# Patient Record
Sex: MALE | Race: WHITE | NOT HISPANIC OR LATINO | Employment: OTHER | URBAN - METROPOLITAN AREA
[De-identification: names, ages, dates, MRNs, and addresses within clinical notes are randomized per-mention and may not be internally consistent; named-entity substitution may affect disease eponyms.]

---

## 2017-09-25 ENCOUNTER — OUTSIDE FACILITY SERVICE (OUTPATIENT)
Dept: GASTROENTEROLOGY | Facility: CLINIC | Age: 67
End: 2017-09-25

## 2017-09-25 PROCEDURE — 43235 EGD DIAGNOSTIC BRUSH WASH: CPT | Performed by: INTERNAL MEDICINE

## 2018-05-10 DIAGNOSIS — Z12.11 SCREENING FOR COLON CANCER: Primary | ICD-10-CM

## 2018-05-17 ENCOUNTER — LAB REQUISITION (OUTPATIENT)
Dept: LAB | Facility: HOSPITAL | Age: 68
End: 2018-05-17

## 2018-05-17 ENCOUNTER — OUTSIDE FACILITY SERVICE (OUTPATIENT)
Dept: GASTROENTEROLOGY | Facility: CLINIC | Age: 68
End: 2018-05-17

## 2018-05-17 DIAGNOSIS — Z86.010 HISTORY OF COLONIC POLYPS: ICD-10-CM

## 2018-05-17 DIAGNOSIS — Z12.11 ENCOUNTER FOR SCREENING FOR MALIGNANT NEOPLASM OF COLON: ICD-10-CM

## 2018-05-17 PROCEDURE — 45385 COLONOSCOPY W/LESION REMOVAL: CPT | Performed by: INTERNAL MEDICINE

## 2018-05-17 PROCEDURE — 88305 TISSUE EXAM BY PATHOLOGIST: CPT | Performed by: INTERNAL MEDICINE

## 2018-05-17 PROCEDURE — 45388 COLONOSCOPY W/ABLATION: CPT | Performed by: INTERNAL MEDICINE

## 2018-05-18 LAB
CYTO UR: NORMAL
LAB AP CASE REPORT: NORMAL
LAB AP CLINICAL INFORMATION: NORMAL
Lab: NORMAL
PATH REPORT.FINAL DX SPEC: NORMAL
PATH REPORT.GROSS SPEC: NORMAL

## 2018-07-23 ENCOUNTER — LAB (OUTPATIENT)
Dept: LAB | Facility: HOSPITAL | Age: 68
End: 2018-07-23

## 2018-07-23 ENCOUNTER — CONSULT (OUTPATIENT)
Dept: ONCOLOGY | Facility: CLINIC | Age: 68
End: 2018-07-23

## 2018-07-23 ENCOUNTER — HOSPITAL ENCOUNTER (OUTPATIENT)
Dept: PULMONOLOGY | Facility: HOSPITAL | Age: 68
Discharge: HOME | End: 2018-07-23
Attending: INTERNAL MEDICINE | Admitting: INTERNAL MEDICINE

## 2018-07-23 VITALS
TEMPERATURE: 97.5 F | HEART RATE: 56 BPM | BODY MASS INDEX: 26.22 KG/M2 | SYSTOLIC BLOOD PRESSURE: 140 MMHG | HEIGHT: 63 IN | RESPIRATION RATE: 14 BRPM | WEIGHT: 148 LBS | DIASTOLIC BLOOD PRESSURE: 87 MMHG

## 2018-07-23 DIAGNOSIS — D75.1 ERYTHROCYTOSIS: Primary | ICD-10-CM

## 2018-07-23 DIAGNOSIS — D75.1 ERYTHROCYTOSIS: ICD-10-CM

## 2018-07-23 LAB
ALBUMIN SERPL-MCNC: 4.7 G/DL (ref 3.2–4.8)
ALBUMIN/GLOB SERPL: 2.1 G/DL (ref 1.5–2.5)
ALP SERPL-CCNC: 74 U/L (ref 25–100)
ALT SERPL W P-5'-P-CCNC: 62 U/L (ref 7–40)
ANION GAP SERPL CALCULATED.3IONS-SCNC: 8 MMOL/L (ref 3–11)
ARTERIAL PATENCY WRIST A: POSITIVE
AST SERPL-CCNC: 35 U/L (ref 0–33)
ATMOSPHERIC PRESS: ABNORMAL MMHG
BASE EXCESS BLDA CALC-SCNC: 0.3 MMOL/L (ref 0–2)
BDY SITE: ABNORMAL
BILIRUB SERPL-MCNC: 0.6 MG/DL (ref 0.3–1.2)
BUN BLD-MCNC: 17 MG/DL (ref 9–23)
BUN/CREAT SERPL: 18.9 (ref 7–25)
CALCIUM SPEC-SCNC: 10.3 MG/DL (ref 8.7–10.4)
CHLORIDE SERPL-SCNC: 100 MMOL/L (ref 99–109)
CO2 BLDA-SCNC: 26.4 MMOL/L (ref 22–33)
CO2 SERPL-SCNC: 29 MMOL/L (ref 20–31)
COHGB MFR BLD: 1.2 % (ref 0–2)
CREAT BLD-MCNC: 0.9 MG/DL (ref 0.6–1.3)
ERYTHROCYTE [DISTWIDTH] IN BLOOD BY AUTOMATED COUNT: 14.3 % (ref 11.3–14.5)
GFR SERPL CREATININE-BSD FRML MDRD: 83 ML/MIN/1.73
GLOBULIN UR ELPH-MCNC: 2.2 GM/DL
GLUCOSE BLD-MCNC: 86 MG/DL (ref 70–100)
HCO3 BLDA-SCNC: 25.2 MMOL/L (ref 20–26)
HCT VFR BLD AUTO: 57.6 % (ref 38.9–50.9)
HCT VFR BLD CALC: 56.5 %
HGB BLD-MCNC: 18.6 G/DL (ref 13.1–17.5)
HGB BLDA-MCNC: 18.4 G/DL (ref 13.5–17.5)
HOROWITZ INDEX BLD+IHG-RTO: 21 %
LYMPHOCYTES # BLD AUTO: 1.5 10*3/MM3 (ref 0.6–4.8)
LYMPHOCYTES NFR BLD AUTO: 22 % (ref 24–44)
MCH RBC QN AUTO: 28.8 PG (ref 27–31)
MCHC RBC AUTO-ENTMCNC: 32.2 G/DL (ref 32–36)
MCV RBC AUTO: 89.3 FL (ref 80–99)
METHGB BLD QL: 1.1 % (ref 0–1.5)
MODALITY: ABNORMAL
MONOCYTES # BLD AUTO: 0.2 10*3/MM3 (ref 0–1)
MONOCYTES NFR BLD AUTO: 3 % (ref 0–12)
NEUTROPHILS # BLD AUTO: 5.2 10*3/MM3 (ref 1.5–8.3)
NEUTROPHILS NFR BLD AUTO: 75 % (ref 41–71)
OXYHGB MFR BLDV: 93.9 % (ref 94–99)
PCO2 BLDA: 40.7 MM HG (ref 35–48)
PCO2 TEMP ADJ BLD: ABNORMAL MM HG (ref 35–48)
PH BLDA: 7.4 PH UNITS (ref 7.35–7.45)
PH, TEMP CORRECTED: ABNORMAL PH UNITS (ref 7.35–7.45)
PLATELET # BLD AUTO: 194 10*3/MM3 (ref 150–450)
PMV BLD AUTO: 7.2 FL (ref 6–12)
PO2 BLDA: 77.5 MM HG (ref 83–108)
PO2 TEMP ADJ BLD: ABNORMAL MM HG (ref 83–108)
POTASSIUM BLD-SCNC: 4.4 MMOL/L (ref 3.5–5.5)
PROT SERPL-MCNC: 6.9 G/DL (ref 5.7–8.2)
RBC # BLD AUTO: 6.45 10*6/MM3 (ref 4.2–5.76)
SODIUM BLD-SCNC: 137 MMOL/L (ref 132–146)
WBC NRBC COR # BLD: 6.9 10*3/MM3 (ref 3.5–10.8)

## 2018-07-23 PROCEDURE — 85025 COMPLETE CBC W/AUTO DIFF WBC: CPT

## 2018-07-23 PROCEDURE — 82805 BLOOD GASES W/O2 SATURATION: CPT

## 2018-07-23 PROCEDURE — 82668 ASSAY OF ERYTHROPOIETIN: CPT

## 2018-07-23 PROCEDURE — 99205 OFFICE O/P NEW HI 60 MIN: CPT | Performed by: INTERNAL MEDICINE

## 2018-07-23 PROCEDURE — 36415 COLL VENOUS BLD VENIPUNCTURE: CPT

## 2018-07-23 PROCEDURE — 36600 WITHDRAWAL OF ARTERIAL BLOOD: CPT

## 2018-07-23 PROCEDURE — 80053 COMPREHEN METABOLIC PANEL: CPT

## 2018-07-23 RX ORDER — TIZANIDINE 4 MG/1
4 TABLET ORAL EVERY 8 HOURS PRN
COMMUNITY
Start: 2018-07-16 | End: 2022-09-30

## 2018-07-23 RX ORDER — VALSARTAN 320 MG/1
320 TABLET ORAL DAILY
COMMUNITY
Start: 2018-07-09 | End: 2018-10-05

## 2018-07-23 NOTE — PROGRESS NOTES
CHIEF COMPLAINT: Erythrocytosis    Problem List:     Erythrocytosis    7/23/2018 Initial Diagnosis     Erythrocytosis: According to records he brought from me had erythrocytosis dating back at least 3 years.  In May 2015 was 52% medical.  By September 2015 down to 48%.  November 2000 1550%.  December 2000 1653%.  June 2017 81%.  December 2000 1751%.  June 2018 55%.  Has noted some recurrent burning in his lungs when biking and hiking and he is fairly vigorously physically active.  In early June he was hiking at over 20,000 feet elevation along with biking.  Subsequently he had noted some burning just with a few miles a biking closer to home.  Been treated with a Z-Farhat but still having that sensation.  Has not smoked.  No erythromelalgia.  No pruritus with hot baths.  No claudication.  Does think he has sleep apnea but he's never been tested.  No history or symptoms of stroke or coronary disease or any other hyperviscosity.            HISTORY OF PRESENT ILLNESS:  The patient is a 68 y.o. male, here for follow up on management of erythrocytosis.  Hematology History of present illness as above.      Past Medical History:   Diagnosis Date   • Arthritis    • Hypertension    • MVP (mitral valve prolapse)      Past Surgical History:   Procedure Laterality Date   • COLONOSCOPY     • ENDOSCOPY  09/29/2017    Dr. Medrano   • HERNIA REPAIR     • LIVER BIOPSY  02/20/2012   • MOUTH BIOPSY  05/25/2011   • SKIN BIOPSY  2018    Dr. Héctor Bernal   • VASECTOMY  01/01/1992     [facility]       Allergies   Allergen Reactions   • Adhesive Tape Other (See Comments)     Tearing of the skin   • Crestor [Rosuvastatin Calcium] Other (See Comments)     Liver failure   • Lipitor [Atorvastatin] Other (See Comments)     Liver failure   • Lisinopril Other (See Comments)     Liver failure   • Zetia [Ezetimibe] Other (See Comments)     Liver failure       Family History and Social History reviewed and changed as necessary      REVIEW OF SYSTEM:   Review  "of Systems   Constitutional: Negative for appetite change, chills, diaphoresis, fatigue, fever and unexpected weight change.   HENT:   Negative for mouth sores, sore throat and trouble swallowing.    Eyes: Negative for icterus.   Respiratory: Negative for cough, hemoptysis and shortness of breath.    Cardiovascular: Negative for chest pain, leg swelling and palpitations.   Gastrointestinal: Negative for abdominal distention, abdominal pain, blood in stool, constipation, diarrhea, nausea and vomiting.   Endocrine: Negative for hot flashes.   Genitourinary: Negative for bladder incontinence, difficulty urinating, dysuria, frequency and hematuria.    Musculoskeletal: Negative for gait problem, neck pain and neck stiffness.   Skin: Negative for rash.   Neurological: Negative for dizziness, gait problem, headaches, light-headedness and numbness.   Hematological: Negative for adenopathy. Does not bruise/bleed easily.   Psychiatric/Behavioral: Negative for depression. The patient is not nervous/anxious.    All other systems reviewed and are negative.       PHYSICAL EXAM    Vitals:    07/23/18 1342   BP: 140/87   Pulse: 56   Resp: 14   Temp: 97.5 °F (36.4 °C)   Weight: 67.1 kg (148 lb)   Height: 160 cm (63\")     Constitutional: Appears well-developed and well-nourished. No distress.   ECOG: (0) Fully active, able to carry on all predisease performance without restriction  HENT:   Head: Normocephalic.   Mouth/Throat: Oropharynx is clear and moist.   Eyes: Conjunctivae are normal. Pupils are equal, round, and reactive to light. No scleral icterus.   Neck: Neck supple. No JVD present. No thyromegaly present.   Cardiovascular: Normal rate, regular rhythm and normal heart sounds.    Pulmonary/Chest: Breath sounds normal. No respiratory distress.   Abdominal: Soft. Exhibits no distension and no mass. There is no hepatosplenomegaly. There is no tenderness. There is no rebound and no guarding.   Musculoskeletal:Exhibits no edema, " tenderness or deformity.   Neurological: Alert and oriented to person, place, and time. Exhibits normal muscle tone.   Skin: No ecchymosis, no petechiae and no rash noted. Not diaphoretic. No cyanosis. Nails show no clubbing.  perhaps a touch of rosacea across the bridge of his nose   Psychiatric: Normal mood and affect.   Vitals reviewed.      No radiology results for the last 7 days          ASSESSMENT & PLAN:    1.  Erythrocytosis  2. Burning in his lungs when he breathes with aggressive exercise  3. Possible obstructive sleep apnea    Discussion: I will assess him for polycythemia rubra vera with BRE 2, JOSEPH R, exon 12-15, and MPL mutation testing.  He has no palpable splenomegaly.  Very mild rosacea changes on his bridge of his nose.  We'll also check a blood gas and carboxyhemoglobin level and we will get him to her pulmonary colleagues referable to his pulmonary symptoms and to assess for other causes of secondary erythrocytosis.  We will check his erythropoietin level and if it is normal and or running high with no other explanations for secondary erythrocytosis then we will do imaging to look for hepatic or renal malignancies that can sometimes present in this manner.  Discussed with patient at length.  Jabier Pizarro MD    07/23/2018

## 2018-07-24 LAB — ETHNIC BACKGROUND STATED: 3.9 MIU/ML (ref 2.6–18.5)

## 2018-07-31 LAB
INTERPRETATION: NORMAL
LAB DIRECTOR NAME PROVIDER: NORMAL
LABORATORY COMMENT REPORT: NORMAL
Lab: NORMAL
T(ABL1,BCR)B2A2/CONTROL (IS) BLD/T: NORMAL %
T(ABL1,BCR)B3A2 MAR QL: NORMAL %
T(ABL1,BCR)E1A2/CONTROL BLD/T: NORMAL %

## 2018-08-09 LAB
CALR EXON 9 MUT ANL BLD/T: NORMAL
JAK2 EXONS 12-15 MUT DET PCR: NORMAL
JAK2 P.V617F BLD/T QL: NORMAL
LAB DIRECTOR NAME PROVIDER: NORMAL
LABORATORY COMMENT REPORT: NORMAL
LABORATORY COMMENT REPORT: NORMAL
Lab: NORMAL
METHOD: NORMAL
MPL MUTATION ANALYSIS RESULT:: NORMAL
REASON FOR REFERRAL (NARRATIVE): NORMAL
REF LAB TEST METHOD: NORMAL
REF LAB TEST METHOD: NORMAL
REFERENCE: NORMAL
REFLEX: NORMAL
SERVICE CMNT-IMP: NORMAL
SPECIMEN PREPARATION: NORMAL
SPECIMEN SOURCE: NORMAL

## 2018-08-20 ENCOUNTER — OFFICE VISIT (OUTPATIENT)
Dept: ONCOLOGY | Facility: CLINIC | Age: 68
End: 2018-08-20

## 2018-08-20 VITALS
HEART RATE: 57 BPM | DIASTOLIC BLOOD PRESSURE: 91 MMHG | TEMPERATURE: 98.1 F | WEIGHT: 151 LBS | RESPIRATION RATE: 14 BRPM | HEIGHT: 63 IN | BODY MASS INDEX: 26.75 KG/M2 | SYSTOLIC BLOOD PRESSURE: 127 MMHG

## 2018-08-20 DIAGNOSIS — D75.1 ERYTHROCYTOSIS: Primary | ICD-10-CM

## 2018-08-20 PROCEDURE — 99213 OFFICE O/P EST LOW 20 MIN: CPT | Performed by: INTERNAL MEDICINE

## 2018-08-20 RX ORDER — CLOBETASOL PROPIONATE 0.05 MG/G
1 GEL TOPICAL DAILY
COMMUNITY
Start: 2018-08-11 | End: 2022-09-30

## 2018-08-20 NOTE — PROGRESS NOTES
CHIEF COMPLAINT: Erythrocytosis    Problem List:     Erythrocytosis    7/23/2018 Initial Diagnosis     Erythrocytosis: According to records he brought from me had erythrocytosis dating back at least 3 years.  In May 2015 was 52% medical.  By September 2015 down to 48%.  November 2000 1550%.  December 2000 1653%.  June 2017 81%.  December 2000 1751%.  June 2018 55%.  Has noted some recurrent burning in his lungs when biking and hiking and he is fairly vigorously physically active.  In early June he was hiking at over 20,000 feet elevation along with biking.  Subsequently he had noted some burning just with a few miles a biking closer to home.  Been treated with a Z-Farhat but still having that sensation.  Has not smoked.  No erythromelalgia.  No pruritus with hot baths.  No claudication.  Does think he has sleep apnea but he's never been tested.  No history or symptoms of stroke or coronary disease or any other hyperviscosity.  I did testing July 2018.  Hematocrit 58%.  Erythropoietin level in the threes at the lower limit of normal.  BCR-ABL, BRE 2, MPL, JOSEPH R, exon 12-13 mutations all negative.  Carboxyhemoglobin normal 1.2.  PaO2 slightly low at 77%.            HISTORY OF PRESENT ILLNESS:  The patient is a 68 y.o. male, here for follow up on management of erythrocytosis.  See above for my workup to this point.  No evidence of myeloproliferative disorder on gene testing.  Carboxyhemoglobin normal but PaO2 slightly low at 77%      Past Medical History:   Diagnosis Date   • Arthritis    • Hypertension    • MVP (mitral valve prolapse)      Past Surgical History:   Procedure Laterality Date   • COLONOSCOPY     • ENDOSCOPY  09/29/2017    Dr. Medrano   • HERNIA REPAIR     • LIVER BIOPSY  02/20/2012   • MOUTH BIOPSY  05/25/2011   • SKIN BIOPSY  2018    Dr. Héctor Bernal   • VASECTOMY  01/01/1992    [facility]       Allergies   Allergen Reactions   • Adhesive Tape Other (See Comments)     Tearing of the skin   • Crestor  "[Rosuvastatin Calcium] Other (See Comments)     Liver failure   • Lipitor [Atorvastatin] Other (See Comments)     Liver failure   • Lisinopril Other (See Comments)     Liver failure   • Zetia [Ezetimibe] Other (See Comments)     Liver failure       Family History and Social History reviewed and changed as necessary      REVIEW OF SYSTEM:   Review of Systems   Constitutional: Negative for appetite change, chills, diaphoresis, fatigue, fever and unexpected weight change.   HENT:   Negative for mouth sores, sore throat and trouble swallowing.    Eyes: Negative for icterus.   Respiratory: Negative for cough, hemoptysis and shortness of breath.    Cardiovascular: Negative for chest pain, leg swelling and palpitations.   Gastrointestinal: Negative for abdominal distention, abdominal pain, blood in stool, constipation, diarrhea, nausea and vomiting.   Endocrine: Negative for hot flashes.   Genitourinary: Negative for bladder incontinence, difficulty urinating, dysuria, frequency and hematuria.    Musculoskeletal: Negative for gait problem, neck pain and neck stiffness.   Skin: Negative for rash.   Neurological: Negative for dizziness, gait problem, headaches, light-headedness and numbness.   Hematological: Negative for adenopathy. Does not bruise/bleed easily.   Psychiatric/Behavioral: Negative for depression. The patient is not nervous/anxious.    All other systems reviewed and are negative.       PHYSICAL EXAM    Vitals:    08/20/18 0955   BP: 127/91   Pulse: 57   Resp: 14   Temp: 98.1 °F (36.7 °C)   Weight: 68.5 kg (151 lb)   Height: 160 cm (63\")     Constitutional: Appears well-developed and well-nourished. No distress.   ECOG: (0) Fully active, able to carry on all predisease performance without restriction  HENT:   Head: Normocephalic.   Mouth/Throat: Oropharynx is clear and moist.   Eyes: Conjunctivae are normal. Pupils are equal, round, and reactive to light. No scleral icterus.   Neck: Neck supple. No JVD present. " No thyromegaly present.   Cardiovascular: Normal rate, regular rhythm and normal heart sounds.    Pulmonary/Chest: Breath sounds normal. No respiratory distress.   Abdominal: Soft. Exhibits no distension and no mass. There is no hepatosplenomegaly. There is no tenderness. There is no rebound and no guarding.   Musculoskeletal:Exhibits no edema, tenderness or deformity.   Neurological: Alert and oriented to person, place, and time. Exhibits normal muscle tone.   Skin: No ecchymosis, no petechiae and no rash noted. Not diaphoretic. No cyanosis. Nails show no clubbing.   Psychiatric: Normal mood and affect.   Vitals reviewed.      No radiology results for the last 7 days          ASSESSMENT & PLAN:    1.  Secondary erythrocytosis: We'll get him to our pulmonary colleagues for pulmonary evaluation and for sleep apnea testing.  If they do not find any cause for secondary erythrocytosis from a pulmonology standpoint, then when I see him back we will check for renal and liver malignancy but that's extremely unlikely and the risks collected level being at the lower end of normal also makes that extremely unlikely.  I would not phlebotomize in this situation as this appears to be secondary erythrocytosis.  Discussed with patient 15 minutes.      Jabier Pizarro MD    08/20/2018

## 2018-10-05 ENCOUNTER — OFFICE VISIT (OUTPATIENT)
Dept: PULMONOLOGY | Facility: CLINIC | Age: 68
End: 2018-10-05

## 2018-10-05 VITALS
BODY MASS INDEX: 26.13 KG/M2 | DIASTOLIC BLOOD PRESSURE: 84 MMHG | HEART RATE: 64 BPM | HEIGHT: 62 IN | OXYGEN SATURATION: 96 % | WEIGHT: 142 LBS | SYSTOLIC BLOOD PRESSURE: 120 MMHG | TEMPERATURE: 98.6 F

## 2018-10-05 DIAGNOSIS — K21.9 CHRONIC GERD: ICD-10-CM

## 2018-10-05 DIAGNOSIS — D75.1 ERYTHROCYTOSIS: ICD-10-CM

## 2018-10-05 DIAGNOSIS — R06.02 SHORTNESS OF BREATH: Primary | ICD-10-CM

## 2018-10-05 PROCEDURE — 94726 PLETHYSMOGRAPHY LUNG VOLUMES: CPT | Performed by: INTERNAL MEDICINE

## 2018-10-05 PROCEDURE — 94729 DIFFUSING CAPACITY: CPT | Performed by: INTERNAL MEDICINE

## 2018-10-05 PROCEDURE — 94375 RESPIRATORY FLOW VOLUME LOOP: CPT | Performed by: INTERNAL MEDICINE

## 2018-10-05 PROCEDURE — 99204 OFFICE O/P NEW MOD 45 MIN: CPT | Performed by: INTERNAL MEDICINE

## 2018-10-05 RX ORDER — IRBESARTAN 300 MG/1
TABLET ORAL
COMMUNITY
Start: 2018-10-01 | End: 2018-11-29

## 2018-10-05 NOTE — PROGRESS NOTES
"  PULMONARY  NOTE    Chief Complaint     Erythrocytosis of uncertain etiology    History of Present Illness     68-year-old white male referred by Dr. Pizarro for evaluation.  He has been found to have erythrocytosis of uncertain etiology.    Patient is a lifelong nonsmoker has no history of known lung disease.    He is an avid cycler and has been quite active.  He indicates that he was in his usual state of health until about a year ago.  He started having problems with reflux, bronchitis, and \"pressure\" on his chest.  He started noticing some problems with cycling with decreased exercise capacity earlier this spring.  He experienced 2 episodes of bronchitis over the last year which were unusual for him.    Eventually, he was evaluated and found to have erythrocytosis.  He has been referred to Dr. Pizarro and subsequently to our office for evaluation of possible underlying lung disease.    He does not have a regular cough or sputum production.  He has had no hemoptysis.    While he doesn't have overt dyspnea on exertion he does feel that his exercise capacity has decreased over the last year.    He has occasional reflux symptoms but only approximately one time per week.  He doesn't take a acid reducing medicine on a regular basis.    He does have some symptoms suggestive of obstructive sleep apnea.  He doesn't feel rested in the morning and has trouble sleeping through the night.  He typically wakes up at about 3 AM but then falls back asleep again.  He gets about 6 hours of sleep every night.    He has no known history of cardiac disease and has noted no chest pain, palpitations, or lower extremity edema.    Patient Active Problem List   Diagnosis   • Erythrocytosis   • GERD     Allergies   Allergen Reactions   • Adhesive Tape Other (See Comments)     Tearing of the skin   • Crestor [Rosuvastatin Calcium] Other (See Comments)     Liver failure   • Lipitor [Atorvastatin] Other (See Comments)     Liver failure   • " "Lisinopril Other (See Comments)     Liver failure   • Zetia [Ezetimibe] Other (See Comments)     Liver failure       Current Outpatient Prescriptions:   •  clobetasol (TEMOVATE) 0.05 % gel, , Disp: , Rfl:   •  irbesartan (AVAPRO) 300 MG tablet, , Disp: , Rfl:   •  sertraline (ZOLOFT) 50 MG tablet, Take 50 mg by mouth Daily., Disp: , Rfl:   •  Sod Picosulfate-Mag Ox-Cit Acd 10-3.5-12 MG-GM -GM/160ML solution, Take 1 kit by mouth Take As Directed. Follow instructions mailed to your home. If you don't receive instructions; call office [phone]., Disp: 2 bottle, Rfl: 0  •  tiZANidine (ZANAFLEX) 4 MG tablet, Take 4 mg by mouth Every 8 (Eight) Hours As Needed., Disp: , Rfl:   MEDICATION LIST AND ALLERGIES REVIEWED.    Family History   Problem Relation Age of Onset   • Breast cancer Mother    • Pancreatic cancer Mother    • Stomach cancer Maternal Grandmother    • Heart failure Father      Social History   Substance Use Topics   • Smoking status: Never Smoker   • Smokeless tobacco: Never Used   • Alcohol use 6.0 oz/week     10 Glasses of wine per week     Social History     Social History Narrative        Retired from [employer]    Second hand smoke exposure at work and as a child.    Drinks about 10 alcoholic drinks a week.      FAMILY AND SOCIAL HISTORY REVIEWED.    Review of Systems  ALSO REFER TO SCANNED ROS SHEET FROM SAME DATE.    /84   Pulse 64   Temp 98.6 °F (37 °C)   Ht 157.5 cm (62\")   Wt 64.4 kg (142 lb)   SpO2 96% Comment: Ra @ Rest  BMI 25.97 kg/m²   Physical Exam   Constitutional: He is oriented to person, place, and time. He appears well-developed. No distress.   HENT:   Head: Normocephalic and atraumatic.   Neck: No thyromegaly present.   Cardiovascular: Normal rate, regular rhythm and normal heart sounds.    No murmur heard.  Pulmonary/Chest: Effort normal and breath sounds normal. No stridor.   Abdominal: Soft. Bowel sounds are normal.   Musculoskeletal: Normal range of motion. He exhibits " no edema.   Lymphadenopathy:     He has no cervical adenopathy.        Right: No supraclavicular and no epitrochlear adenopathy present.        Left: No supraclavicular and no epitrochlear adenopathy present.   Neurological: He is alert and oriented to person, place, and time.   Skin: Skin is warm and dry. He is not diaphoretic.   Psychiatric: He has a normal mood and affect. His behavior is normal.   Nursing note and vitals reviewed.      Results     PFT's reveals no airway obstruction, no restriction, and normal diffusion.    CXR reveals borderline cardiomegaly, NAD.    Problem List       ICD-10-CM ICD-9-CM   1. Shortness of breath R06.02 786.05   2. Erythrocytosis D75.1 289.0   3. GERD K21.9 530.81       Discussion     We he discussed his test results.  PFT's are unremarkable and normal so no obvious cause for his Erythrocytosis.  We will arrange for a home sleep study and nocturnal pulse oximetry.    He has borderline cardiomegaly so will get an echocardiogram.    I will see him back after the above.    Andrzej Bauman MD  Note electronically signed    CC: Antonia Conner MD

## 2018-10-08 DIAGNOSIS — I51.7 CARDIOMEGALY: Primary | ICD-10-CM

## 2018-10-08 DIAGNOSIS — G47.9 DISTURBANCE IN SLEEP BEHAVIOR: ICD-10-CM

## 2018-10-22 ENCOUNTER — HOSPITAL ENCOUNTER (OUTPATIENT)
Dept: CARDIOLOGY | Facility: HOSPITAL | Age: 68
Discharge: HOME | End: 2018-10-22
Attending: INTERNAL MEDICINE | Admitting: INTERNAL MEDICINE

## 2018-10-22 VITALS — WEIGHT: 146 LBS | BODY MASS INDEX: 26.87 KG/M2 | HEIGHT: 62 IN

## 2018-10-22 DIAGNOSIS — I51.7 CARDIOMEGALY: ICD-10-CM

## 2018-10-22 LAB
BH CV ECHO MEAS - AO MAX PG (FULL): 3.1 MMHG
BH CV ECHO MEAS - AO MAX PG: 6 MMHG
BH CV ECHO MEAS - AO MEAN PG (FULL): 1 MMHG
BH CV ECHO MEAS - AO MEAN PG: 3 MMHG
BH CV ECHO MEAS - AO ROOT AREA (BSA CORRECTED): 1.8
BH CV ECHO MEAS - AO ROOT AREA: 7.1 CM^2
BH CV ECHO MEAS - AO ROOT DIAM: 3 CM
BH CV ECHO MEAS - AO V2 MAX: 121 CM/SEC
BH CV ECHO MEAS - AO V2 MEAN: 86.2 CM/SEC
BH CV ECHO MEAS - AO V2 VTI: 27.2 CM
BH CV ECHO MEAS - AVA(I,A): 2 CM^2
BH CV ECHO MEAS - AVA(I,D): 2 CM^2
BH CV ECHO MEAS - AVA(V,A): 1.8 CM^2
BH CV ECHO MEAS - AVA(V,D): 1.8 CM^2
BH CV ECHO MEAS - BSA(HAYCOCK): 1.7 M^2
BH CV ECHO MEAS - BSA: 1.7 M^2
BH CV ECHO MEAS - BZI_BMI: 26.7 KILOGRAMS/M^2
BH CV ECHO MEAS - BZI_METRIC_HEIGHT: 157.5 CM
BH CV ECHO MEAS - BZI_METRIC_WEIGHT: 66.2 KG
BH CV ECHO MEAS - EDV(CUBED): 81.7 ML
BH CV ECHO MEAS - EDV(MOD-SP2): 94 ML
BH CV ECHO MEAS - EDV(MOD-SP4): 95 ML
BH CV ECHO MEAS - EDV(TEICH): 84.9 ML
BH CV ECHO MEAS - EF(CUBED): 72.3 %
BH CV ECHO MEAS - EF(MOD-BP): 61 %
BH CV ECHO MEAS - EF(MOD-SP2): 62.8 %
BH CV ECHO MEAS - EF(MOD-SP4): 61.1 %
BH CV ECHO MEAS - EF(TEICH): 64.3 %
BH CV ECHO MEAS - ESV(CUBED): 22.7 ML
BH CV ECHO MEAS - ESV(MOD-SP2): 35 ML
BH CV ECHO MEAS - ESV(MOD-SP4): 37 ML
BH CV ECHO MEAS - ESV(TEICH): 30.3 ML
BH CV ECHO MEAS - FS: 34.8 %
BH CV ECHO MEAS - IVS/LVPW: 0.95
BH CV ECHO MEAS - IVSD: 1.2 CM
BH CV ECHO MEAS - LA DIMENSION: 3.7 CM
BH CV ECHO MEAS - LA/AO: 1.2
BH CV ECHO MEAS - LAD MAJOR: 5.1 CM
BH CV ECHO MEAS - LAT PEAK E' VEL: 6.9 CM/SEC
BH CV ECHO MEAS - LATERAL E/E' RATIO: 8.3
BH CV ECHO MEAS - LV DIASTOLIC VOL/BSA (35-75): 56.8 ML/M^2
BH CV ECHO MEAS - LV MASS(C)D: 182.8 GRAMS
BH CV ECHO MEAS - LV MASS(C)DI: 109.3 GRAMS/M^2
BH CV ECHO MEAS - LV MAX PG: 2.9 MMHG
BH CV ECHO MEAS - LV MEAN PG: 2 MMHG
BH CV ECHO MEAS - LV SYSTOLIC VOL/BSA (12-30): 22.1 ML/M^2
BH CV ECHO MEAS - LV V1 MAX: 85 CM/SEC
BH CV ECHO MEAS - LV V1 MEAN: 66.9 CM/SEC
BH CV ECHO MEAS - LV V1 VTI: 21.6 CM
BH CV ECHO MEAS - LVIDD: 4.3 CM
BH CV ECHO MEAS - LVIDS: 2.8 CM
BH CV ECHO MEAS - LVLD AP2: 7.7 CM
BH CV ECHO MEAS - LVLD AP4: 7.8 CM
BH CV ECHO MEAS - LVLS AP2: 6.4 CM
BH CV ECHO MEAS - LVLS AP4: 6 CM
BH CV ECHO MEAS - LVOT AREA (M): 2.5 CM^2
BH CV ECHO MEAS - LVOT AREA: 2.5 CM^2
BH CV ECHO MEAS - LVOT DIAM: 1.8 CM
BH CV ECHO MEAS - LVPWD: 1.2 CM
BH CV ECHO MEAS - MED PEAK E' VEL: 5.4 CM/SEC
BH CV ECHO MEAS - MEDIAL E/E' RATIO: 10.5
BH CV ECHO MEAS - MV A MAX VEL: 72.6 CM/SEC
BH CV ECHO MEAS - MV DEC TIME: 0.24 SEC
BH CV ECHO MEAS - MV E MAX VEL: 57.3 CM/SEC
BH CV ECHO MEAS - MV E/A: 0.79
BH CV ECHO MEAS - PA ACC SLOPE: 457 CM/SEC^2
BH CV ECHO MEAS - PA ACC TIME: 0.15 SEC
BH CV ECHO MEAS - PA MAX PG: 3.8 MMHG
BH CV ECHO MEAS - PA PR(ACCEL): 12.4 MMHG
BH CV ECHO MEAS - PA V2 MAX: 98 CM/SEC
BH CV ECHO MEAS - PI END-D VEL: 127 CM/SEC
BH CV ECHO MEAS - RAP SYSTOLE: 3 MMHG
BH CV ECHO MEAS - RVSP: 19 MMHG
BH CV ECHO MEAS - SI(AO): 115 ML/M^2
BH CV ECHO MEAS - SI(CUBED): 35.3 ML/M^2
BH CV ECHO MEAS - SI(LVOT): 32.9 ML/M^2
BH CV ECHO MEAS - SI(MOD-SP2): 35.3 ML/M^2
BH CV ECHO MEAS - SI(MOD-SP4): 34.7 ML/M^2
BH CV ECHO MEAS - SI(TEICH): 32.6 ML/M^2
BH CV ECHO MEAS - SV(AO): 192.3 ML
BH CV ECHO MEAS - SV(CUBED): 59.1 ML
BH CV ECHO MEAS - SV(LVOT): 55 ML
BH CV ECHO MEAS - SV(MOD-SP2): 59 ML
BH CV ECHO MEAS - SV(MOD-SP4): 58 ML
BH CV ECHO MEAS - SV(TEICH): 54.6 ML
BH CV ECHO MEAS - TAPSE (>1.6): 2.5 CM2
BH CV ECHO MEAS - TR MAX PG: 16 MMHG
BH CV ECHO MEAS - TR MAX VEL: 202 CM/SEC
BH CV ECHO MEASUREMENTS AVERAGE E/E' RATIO: 9.32
BH CV VAS BP RIGHT ARM: NORMAL MMHG
BH CV XLRA - RV BASE: 3.5 CM
BH CV XLRA - RV LENGTH: 6.9 CM
BH CV XLRA - RV MID: 2.9 CM
BH CV XLRA - TDI S': 14.7 CM/SEC
LEFT ATRIUM VOLUME INDEX: 28.7 ML/M^2
LV EF 2D ECHO EST: 60 %

## 2018-10-22 PROCEDURE — 93306 TTE W/DOPPLER COMPLETE: CPT

## 2018-10-22 PROCEDURE — 93306 TTE W/DOPPLER COMPLETE: CPT | Performed by: INTERNAL MEDICINE

## 2018-11-01 ENCOUNTER — OFFICE VISIT (OUTPATIENT)
Dept: PULMONOLOGY | Facility: CLINIC | Age: 68
End: 2018-11-01

## 2018-11-01 VITALS
WEIGHT: 149 LBS | BODY MASS INDEX: 27.42 KG/M2 | SYSTOLIC BLOOD PRESSURE: 138 MMHG | TEMPERATURE: 98.7 F | HEART RATE: 56 BPM | DIASTOLIC BLOOD PRESSURE: 82 MMHG | HEIGHT: 62 IN | OXYGEN SATURATION: 96 %

## 2018-11-01 DIAGNOSIS — K21.9 CHRONIC GERD: ICD-10-CM

## 2018-11-01 DIAGNOSIS — D75.1 ERYTHROCYTOSIS: Primary | ICD-10-CM

## 2018-11-01 PROCEDURE — 99213 OFFICE O/P EST LOW 20 MIN: CPT | Performed by: NURSE PRACTITIONER

## 2018-11-01 RX ORDER — CEPHALEXIN 500 MG/1
TABLET ORAL
COMMUNITY
Start: 2018-10-17 | End: 2018-11-29

## 2018-11-01 RX ORDER — TERBINAFINE HYDROCHLORIDE 250 MG/1
TABLET ORAL
COMMUNITY
Start: 2018-10-17 | End: 2018-11-29

## 2018-11-01 RX ORDER — KETOCONAZOLE 20 MG/G
1 CREAM TOPICAL NIGHTLY
COMMUNITY
Start: 2018-10-25 | End: 2022-09-30

## 2018-11-01 NOTE — PROGRESS NOTES
[facility] [place] [facility]    CHIEF COMPLAINT    Erythrocytosis of uncertain etiology    HISTORY OF PRESENT ILLNESS    Harvey Levine is a 68 y.o.male here today for follow-up after his ECHO on 10/24.  He was last seen in our office in early October by Dr. Bauman.  He was referred to our office by Dr. Pizarro.  He was found recently to have erythrocytosis and felt that this could possibly be due to an underlying lung disease.  He has been feeling good since his last visit.  However, he broke out into a rash on his lower extremities and has seen a dermatologist.  He was given an antibiotic cream and the rash improved.  It then returned and he had a spot on his left leg biopsied and was found to have a bacterial infection.  He is now on a different antibiotic cream and this rash has improved.      He is scheduled for his sleep consultation with Dr. Winters at the end of the month.  He continues to sleep about 3 hours on average every night.  He states his restless leg keeps him up at night and the medications he has helps but if he takes a full dose he feels bad the following day.  He is anxious to get his sleep study completed.      He denies fever, chills, sputum production, dyspnea, cough, chest pain or palpitations.  He states he continues to work outdoors and do his regular routines without difficulty.  He is wanting to know his results of his ECHO on 10/22/18.       Patient Active Problem List   Diagnosis   • Erythrocytosis   • GERD       Allergies   Allergen Reactions   • Adhesive Tape Other (See Comments)     Tearing of the skin   • Crestor [Rosuvastatin Calcium] Other (See Comments)     Liver failure   • Lipitor [Atorvastatin] Other (See Comments)     Liver failure   • Lisinopril Other (See Comments)     Liver failure   • Zetia [Ezetimibe] Other (See Comments)     Liver failure       Current Outpatient Prescriptions:   •  Cephalexin 500 MG tablet, , Disp: , Rfl:   •  clobetasol (TEMOVATE) 0.05 % gel, ,  "Disp: , Rfl:   •  irbesartan (AVAPRO) 300 MG tablet, , Disp: , Rfl:   •  ketoconazole (NIZORAL) 2 % cream, , Disp: , Rfl:   •  sertraline (ZOLOFT) 50 MG tablet, Take 50 mg by mouth Daily., Disp: , Rfl:   •  terbinafine (lamiSIL) 250 MG tablet, , Disp: , Rfl:   •  tiZANidine (ZANAFLEX) 4 MG tablet, Take 4 mg by mouth Every 8 (Eight) Hours As Needed., Disp: , Rfl:   MEDICATION LIST AND ALLERGIES REVIEWED.    Social History   Substance Use Topics   • Smoking status: Never Smoker   • Smokeless tobacco: Never Used   • Alcohol use 6.0 oz/week     10 Glasses of wine per week       FAMILY AND SOCIAL HISTORY REVIEWED.    Review of Systems   Constitutional: Negative for activity change, appetite change, fatigue, fever and unexpected weight change.   HENT: Negative for congestion, postnasal drip, rhinorrhea, sinus pressure, sore throat and voice change.    Eyes: Negative for visual disturbance.   Respiratory: Negative for cough, chest tightness, shortness of breath and wheezing.    Cardiovascular: Negative for chest pain, palpitations and leg swelling.   Gastrointestinal: Negative for abdominal distention, abdominal pain, nausea and vomiting.   Endocrine: Negative for cold intolerance and heat intolerance.   Genitourinary: Negative for difficulty urinating and urgency.   Musculoskeletal: Negative for arthralgias, back pain and neck pain.   Skin: Negative for color change and pallor.   Allergic/Immunologic: Negative for environmental allergies and food allergies.   Neurological: Negative for dizziness, syncope, weakness and light-headedness.   Hematological: Negative for adenopathy. Does not bruise/bleed easily.   Psychiatric/Behavioral: Negative for agitation and behavioral problems.   .    /82   Pulse 56   Temp 98.7 °F (37.1 °C)   Ht 157.5 cm (62\")   Wt 67.6 kg (149 lb)   SpO2 96% Comment: RA @ REST  BMI 27.25 kg/m²     Immunization History   Administered Date(s) Administered   • Influenza, Unspecified 10/28/2018 "       Physical Exam   Constitutional: He is oriented to person, place, and time. He appears well-developed and well-nourished.   HENT:   Head: Normocephalic and atraumatic.   Eyes: Pupils are equal, round, and reactive to light.   Neck: Normal range of motion. Neck supple. No thyromegaly present.   Cardiovascular: Normal rate, regular rhythm, normal heart sounds and intact distal pulses.  Exam reveals no gallop and no friction rub.    No murmur heard.  Pulmonary/Chest: Effort normal and breath sounds normal. No respiratory distress. He has no wheezes. He has no rales. He exhibits no tenderness.   Abdominal: Soft. Bowel sounds are normal. There is no tenderness.   Musculoskeletal: Normal range of motion.   Lymphadenopathy:     He has no cervical adenopathy.   Neurological: He is alert and oriented to person, place, and time.   Skin: Skin is warm and dry. Capillary refill takes less than 2 seconds. He is not diaphoretic.   Psychiatric: He has a normal mood and affect. His behavior is normal.   Nursing note and vitals reviewed.        RESULTS  ECHO- 10/22/18  Interpretation Summary     · Left ventricular systolic function is normal. Estimated EF = 60%.  · Left ventricular diastolic dysfunction (grade I) consistent with impaired relaxation.  · Left ventricular wall thickness is consistent with mild concentric hypertrophy.  · Mild-to-moderate mitral valve regurgitation is present  · Calculated right ventricular systolic pressure from tricuspid regurgitation is 19 mmHg.           PROBLEM LIST    Problem List Items Addressed This Visit        Digestive    GERD       Hematopoietic and Hemostatic    Erythrocytosis - Primary            DISCUSSION  Mr. Levine was here today for follow-up of his ECHO completed on 10/22.  I discussed his ECHO results with him.  He needs to have his sleep study completed and has this scheduled at the end of the month.  He will follow-up with Dr. Pizarro after the sleep study is completed.    He will  follow-up with Dr. Bauman in 3 months or sooner if new symptoms arise.  He will call me if he needs anything in the interim.    I spent 15 minutes with the patient. I spent > 50% percent of this time counseling and discussing diagnosis, prognosis, diagnostic testing, evaluation, current status, treatment options and management.    Mary FAVIO Archer, APRN  11/01/201810:00 AM  Electronically signed     Please note that portions of this note were completed with a voice recognition program. Efforts were made to edit the dictations, but occasionally words are mistranscribed.      CC: Antonia Conner MD

## 2018-11-29 ENCOUNTER — HOSPITAL ENCOUNTER (OUTPATIENT)
Dept: SLEEP MEDICINE | Facility: HOSPITAL | Age: 68
Discharge: HOME | End: 2018-11-29
Attending: INTERNAL MEDICINE | Admitting: INTERNAL MEDICINE

## 2018-11-29 ENCOUNTER — CONSULT (OUTPATIENT)
Dept: SLEEP MEDICINE | Facility: HOSPITAL | Age: 68
End: 2018-11-29

## 2018-11-29 VITALS
OXYGEN SATURATION: 96 % | HEART RATE: 57 BPM | BODY MASS INDEX: 26.9 KG/M2 | DIASTOLIC BLOOD PRESSURE: 82 MMHG | HEIGHT: 63 IN | SYSTOLIC BLOOD PRESSURE: 119 MMHG | WEIGHT: 151.8 LBS

## 2018-11-29 VITALS
OXYGEN SATURATION: 93 % | BODY MASS INDEX: 27.2 KG/M2 | WEIGHT: 153.5 LBS | DIASTOLIC BLOOD PRESSURE: 90 MMHG | HEIGHT: 63 IN | SYSTOLIC BLOOD PRESSURE: 155 MMHG | HEART RATE: 56 BPM

## 2018-11-29 DIAGNOSIS — G47.61 PERIODIC LIMB MOVEMENT DISORDER (PLMD): ICD-10-CM

## 2018-11-29 DIAGNOSIS — R40.0 SLEEPINESS: ICD-10-CM

## 2018-11-29 DIAGNOSIS — G25.81 RESTLESS LEGS SYNDROME (RLS): ICD-10-CM

## 2018-11-29 DIAGNOSIS — D75.1 ERYTHROCYTOSIS: ICD-10-CM

## 2018-11-29 DIAGNOSIS — G47.33 OSA (OBSTRUCTIVE SLEEP APNEA): ICD-10-CM

## 2018-11-29 DIAGNOSIS — G47.33 OSA (OBSTRUCTIVE SLEEP APNEA): Primary | ICD-10-CM

## 2018-11-29 PROCEDURE — 95810 POLYSOM 6/> YRS 4/> PARAM: CPT | Performed by: INTERNAL MEDICINE

## 2018-11-29 PROCEDURE — 99214 OFFICE O/P EST MOD 30 MIN: CPT | Performed by: INTERNAL MEDICINE

## 2018-11-29 PROCEDURE — 95810 POLYSOM 6/> YRS 4/> PARAM: CPT

## 2018-11-29 RX ORDER — TELMISARTAN 80 MG/1
80 TABLET ORAL DAILY
COMMUNITY
Start: 2018-11-23

## 2018-11-29 NOTE — PROGRESS NOTES
Harvey Levine is a 68 y.o. male.   Chief Complaint   Patient presents with   • Sleeping Problem       HPI     68 y.o. male seen in consultation at the request of Andrzej Bauman,* for evaluation of the above.     He has a history of polycythemia.  His last hematocrit was 57 earlier this year.  He has undergone an extensive evaluation by Dr. Pizarro.  He has been evaluated by pulmonary/Dr. Bauman for evaluation of possible lung disease.  He had normal PFTs no evidence of significant chronic disease.  He had airway symptoms over the last year suggestive of a acute/subacute bronchial process.    From a sleep standpoint he has long-standing history of frequent awakenings at night as well as difficulty returning to sleep.  He typically wakes up around 3 AM like clockwork.  He theorizes that this may be related to his father's awakening at 3 AM with PTSD symptoms that caused a disturbance for the family.    He does note some daytime somnolence.  His wife has noted snoring primarily when he is in the supine position.    He does have long-standing RLS type symptoms.  In fact, he will sometimes awaken at night and exercise to settle his legs down.    Further details are as follows:    Norwood Scale is: 4/24    Estimated average amount of sleep per night: 7  Number of times he wakes up at night: 2  Difficulty falling back asleep: yes  It usually takes 30 minutes to go to sleep.  He feels sleepy upon waking up: yes  Rotating or night shift work: no    Drowsiness/Sleepiness:  He exhibits the following:  excessive daytime sleepiness  excessive daytime fatigue  falls asleep watching TV  falls asleep during times of the day when he is quiet  sleepy even while on vacation    Snoring/Breathing:  He exhibits the following:  loud snoring    Reflux:  He describes the following:  none    Narcolepsy:  He exhibits the following:  none    RLS/PLMs:  He describes the following:  moves or jerks during sleep  discomfort in legs  "with an urge to move them    Insomnia:  He describes the following:  problems initiating sleep at night  frequent awakenings  restless sleep    Parasomnia:  He exhibits the following:  sleep talks  frequent nightmares    Weight:  Weight change in the last year:  gain:  0 lbs      The patient's relevant past medical, surgical, family, and social history reviewed and updated in Epic as appropriate.    Current medications are:   Current Outpatient Medications:   •  clobetasol (TEMOVATE) 0.05 % gel, , Disp: , Rfl:   •  ketoconazole (NIZORAL) 2 % cream, , Disp: , Rfl:   •  sertraline (ZOLOFT) 50 MG tablet, Take 50 mg by mouth Daily., Disp: , Rfl:   •  telmisartan (MICARDIS) 80 MG tablet, , Disp: , Rfl:   •  tiZANidine (ZANAFLEX) 4 MG tablet, Take 4 mg by mouth Every 8 (Eight) Hours As Needed., Disp: , Rfl: .    Review of Systems    Review of Systems  ROS documented in patient questionnaire ×12 systems.  Reviewed with patient.  Otherwise negative except as noted in HPI.    Physical Exam    Blood pressure 119/82, pulse 57, height 160 cm (63\"), weight 68.9 kg (151 lb 12.8 oz), SpO2 96 %. Body mass index is 26.89 kg/m².    Physical Exam   Constitutional: He is oriented to person, place, and time. He appears well-developed and well-nourished.   HENT:   Head: Normocephalic and atraumatic.   Nose: Nose normal.   Mouth/Throat: Oropharynx is clear and moist. No oropharyngeal exudate.   Class 2-3 airway   Eyes: Conjunctivae are normal. No scleral icterus.   Neck: No tracheal deviation present. No thyromegaly present.   Cardiovascular: Normal rate and regular rhythm. Exam reveals no gallop and no friction rub.   No murmur heard.  Pulmonary/Chest: Effort normal. No respiratory distress. He has no wheezes. He has no rales.   Musculoskeletal: He exhibits no edema or deformity.   Neurological: He is alert and oriented to person, place, and time.   Skin: Skin is warm and dry. No rash noted.   Psychiatric: He has a normal mood and " affect. His behavior is normal.   Nursing note and vitals reviewed.      ASSESSMENT:    Problem List Items Addressed This Visit        Pulmonary Problems    HUGH (obstructive sleep apnea) - possible - Primary    Relevant Orders    Polysomnography 4 or More Parameters       Other    Sleepiness    Relevant Orders    Polysomnography 4 or More Parameters    Restless legs syndrome (RLS)    Periodic limb movement disorder (PLMD) - possible    Relevant Orders    Polysomnography 4 or More Parameters    Erythrocytosis          He certainly has symptoms to suggest one or 2 sleep disorders, namely obstructive sleep apnea and/or PLMD.  I'm not sure this is connected at all to his elevated hematocrit but he needs a sleep evaluation nonetheless.  We will see if he demonstrates significant hypoxic stress during his study.    PLAN:    1. Polysomnogram needed due to the multiple diagnostic possibilities.  An HSAT would be inadequate.  2. I discussed the possible diagnoses, the diagnostic process, and potential treatments with the patient in detail and answered all questions  3. Further recommendations will depend on the results of his polysomnogram.    I have reviewed the results of my evaluation and impression and discussed my recommendations in detail with the patient.      Signed by  Axel Winters MD    November 29, 2018      CC: Antonia Conner MD Thompson, John Randall,*

## 2018-11-30 DIAGNOSIS — G47.33 MODERATE OBSTRUCTIVE SLEEP APNEA: Primary | ICD-10-CM

## 2018-12-06 ENCOUNTER — OFFICE VISIT (OUTPATIENT)
Dept: SLEEP MEDICINE | Facility: HOSPITAL | Age: 68
End: 2018-12-06

## 2018-12-06 VITALS
DIASTOLIC BLOOD PRESSURE: 89 MMHG | HEART RATE: 52 BPM | HEIGHT: 63 IN | WEIGHT: 151.4 LBS | SYSTOLIC BLOOD PRESSURE: 139 MMHG | OXYGEN SATURATION: 95 % | BODY MASS INDEX: 26.82 KG/M2

## 2018-12-06 DIAGNOSIS — G47.61 PLMD (PERIODIC LIMB MOVEMENT DISORDER): Primary | ICD-10-CM

## 2018-12-06 DIAGNOSIS — G47.33 MODERATE OBSTRUCTIVE SLEEP APNEA: ICD-10-CM

## 2018-12-06 PROCEDURE — 99213 OFFICE O/P EST LOW 20 MIN: CPT | Performed by: NURSE PRACTITIONER

## 2018-12-06 RX ORDER — ROPINIROLE 0.5 MG/1
0.5 TABLET, FILM COATED ORAL NIGHTLY
Qty: 30 TABLET | Refills: 4 | Status: SHIPPED | OUTPATIENT
Start: 2018-12-06 | End: 2019-02-07 | Stop reason: DRUGHIGH

## 2018-12-06 NOTE — PROGRESS NOTES
Subjective:   Follow-up      Chief Complaint:   Chief Complaint   Patient presents with   • Follow-up       HPI:    Harvey Levine is a 68 y.o. male here for follow-up of study results.  Patient was seen initially for consult on 11/29/18 he did have a long history of frequent awakenings, daytime somnolence, and snoring when supine.  He underwent a sleep study on 11/29/18 that showed moderate obstructive sleep apnea, nocturnal hypoxemia, and PLMD.  Patient has an erratic sleep pattern throughout the night.  His Amo score is 2/24.  Patient would like to start a medication for his PLMD as this does wake him up several times in the night.  He would like to initiate CPAP therapy.        Current medications are:   Current Outpatient Medications:   •  clobetasol (TEMOVATE) 0.05 % gel, , Disp: , Rfl:   •  ketoconazole (NIZORAL) 2 % cream, , Disp: , Rfl:   •  sertraline (ZOLOFT) 50 MG tablet, Take 50 mg by mouth Daily., Disp: , Rfl:   •  telmisartan (MICARDIS) 80 MG tablet, , Disp: , Rfl:   •  tiZANidine (ZANAFLEX) 4 MG tablet, Take 4 mg by mouth Every 8 (Eight) Hours As Needed., Disp: , Rfl:   •  rOPINIRole (REQUIP) 0.5 MG tablet, Take 1 tablet by mouth Every Night. Take 1 hour before bedtime., Disp: 30 tablet, Rfl: 4.      The patient's relevant past medical, surgical, family and social history were reviewed and updated in Epic as appropriate.       Review of Systems   Respiratory: Positive for apnea.    Musculoskeletal: Positive for myalgias.   Psychiatric/Behavioral: Positive for sleep disturbance.   All other systems reviewed and are negative.        Objective:    Physical Exam   Constitutional: He is oriented to person, place, and time. He appears well-developed and well-nourished.   HENT:   Head: Normocephalic and atraumatic.   Mouth/Throat: Oropharynx is clear and moist.   Mallampati 2 anatomy   Eyes: Conjunctivae are normal.   Neck: Neck supple. No thyromegaly present.   Cardiovascular: Regular rhythm.  "Bradycardia present.   Pulmonary/Chest: Effort normal and breath sounds normal.   Lymphadenopathy:     He has no cervical adenopathy.   Neurological: He is alert and oriented to person, place, and time.   Skin: Skin is warm and dry.   Psychiatric: He has a normal mood and affect. His behavior is normal. Judgment and thought content normal.   Nursing note and vitals reviewed.    /89   Pulse 52   Ht 160 cm (62.99\")   Wt 68.7 kg (151 lb 6.4 oz)   SpO2 95%   BMI 26.83 kg/m²     ASSESSMENT/PLAN    Harvey was seen today for follow-up.    Diagnoses and all orders for this visit:    PLMD (periodic limb movement disorder)  -     rOPINIRole (REQUIP) 0.5 MG tablet; Take 1 tablet by mouth Every Night. Take 1 hour before bedtime.            1. Counseled patient regarding multimodal approach with healthy nutrition, healthy sleep, regular physical activity, social activities, counseling, and medications. Encouraged to practice lateral sleep position. Avoid alcohol and sedatives close to bedtime.  2. We will send an order for CPAP therapy to DME of patient's choosing.  I will then see him back in 31-90 days.  At this next visit we will reassess nocturnal hypoxemia by ordering 24-hour pulse oximetry while he is on CPAP.  3. Patient has been started on Requip 0.5 mg 1 by mouth 1 hour before bedtime.  I have given him 3 refills.    I have reviewed the results of my evaluation and impression and discussed my recommendations in detail with the patient.      Signed by  PAZ Peraza    December 6, 2018      CC: Antonia Conner MD          No ref. provider found      "

## 2018-12-06 NOTE — PATIENT INSTRUCTIONS
Hypoxemia  Hypoxemia occurs when the blood does not contain enough oxygen. The body cannot work well when it does not have enough oxygen because every part of the body needs oxygen. Oxygen enters the lungs when we breathe in, then it travels to all parts of the body through the blood. Hypoxemia can develop suddenly or slowly.  What are the causes?  Common causes of this condition include:  · Long-term (chronic) lung diseases, such as chronic obstructive pulmonary disease (COPD) or interstitial lung disease.  · Disorders that affect breathing at night, such as sleep apnea.  · Fluid buildup in the lungs (pulmonary edema).  · Lung infection (pneumonia).  · Lung or throat cancer.  · Abnormal blood flow that bypasses the lungs (having a shunt).  · Certain diseases that affect nerves or muscles.  · A collapsed lung (pneumothorax).  · A blood clot in the lungs (pulmonary embolus).  · Certain types of heart disease.  · Slow or shallow breathing (hypoventilation).  · Certain medicines.  · High altitudes.  · Toxic chemicals, smoke, and gases.    What are the signs or symptoms?  In some cases, there may be no symptoms of this condition. If you do have symptoms, they may include:  · Shortness of breath (dyspnea).  · Bluish color of the skin, lips, or nail beds.  · Breathing that is fast, noisy, or shallow.  · A fast heartbeat.  · Feeling tired or sleepy.  · Feeling confused or worried.    If hypoxemia develops quickly, you will likely have dyspnea. If hypoxemia develops slowly over months or years, you may not notice any symptoms.  How is this diagnosed?  This condition is diagnosed by:  · A physical exam.  · Blood tests.  · A test that measures the percentage of oxygen in your blood (pulse oximetry). This is done with a sensor that is placed on your finger, toe, or earlobe.    How is this treated?  Treatment for this condition depends on the underlying cause of your hypoxemia. You will likely be treated with oxygen therapy to  restore your blood oxygen level. Depending on the cause of your hypoxemia, you may need oxygen therapy for a short time (weeks or months), or you may need it for the rest of your life.  Your health care provider may also recommend other therapies to treat the underlying cause of your hypoxemia.  Follow these instructions at home:  · Take over-the-counter and prescription medicines only as told by your health care provider.  · If you are on oxygen therapy, follow oxygen safety precautions as directed by your health care provider. These may include:  ? Always having a backup supply of oxygen.  ? Not allowing anyone to smoke or have a fire around oxygen.  ? Handling oxygen tanks carefully and as instructed.  · Do not use any products that contain nicotine or tobacco, such as cigarettes and e-cigarettes. If you need help quitting, ask your health care provider. Stay away from people who smoke.  · Keep all follow-up visits as told by your health care provider. This is important.  Contact a health care provider if:  · You have any concerns about your oxygen therapy.  · You have trouble breathing, even during or after treatment.  · You become short of breath when you exercise.  · You are tired when you wake up.  · You have a headache when you wake up.  Get help right away if:  · Your shortness of breath gets worse, especially with normal or minimal activity.  · You have a bluish color of the skin, lips, or nail beds.  · You become confused or you cannot think properly.  · You cough up dark mucus or blood.  · You have chest pain.  · You have a fever.  Summary  · Hypoxemia occurs when the blood does not contain enough oxygen.  · Hypoxemia may or may not cause symptoms. Often, the main symptom is shortness of breath (dyspnea).  · Depending on the cause of your hypoxemia, you may need oxygen therapy for a short time (weeks or months), or you may need it for the rest of your life.  · If you are on oxygen therapy, follow oxygen  safety precautions as directed by your health care provider.  This information is not intended to replace advice given to you by your health care provider. Make sure you discuss any questions you have with your health care provider.  Document Released: 07/02/2012 Document Revised: 11/21/2017 Document Reviewed: 11/21/2017  [employer] Interactive Patient Education © 2017 [employer]  Sleep Apnea  Sleep apnea is a condition that affects breathing. People with sleep apnea have moments during sleep when their breathing pauses briefly or gets shallow. Sleep apnea can cause these symptoms:  · Trouble staying asleep.  · Sleepiness or tiredness during the day.  · Irritability.  · Loud snoring.  · Morning headaches.  · Trouble concentrating.  · Forgetting things.  · Less interest in sex.  · Being sleepy for no reason.  · Mood swings.  · Personality changes.  · Depression.  · Waking up a lot during the night to pee (urinate).  · Dry mouth.  · Sore throat.    Follow these instructions at home:  · Make any changes in your routine that your doctor recommends.  · Eat a healthy, well-balanced diet.  · Take over-the-counter and prescription medicines only as told by your doctor.  · Avoid using alcohol, calming medicines (sedatives), and narcotic medicines.  · Take steps to lose weight if you are overweight.  · If you were given a machine (device) to use while you sleep, use it only as told by your doctor.  · Do not use any tobacco products, such as cigarettes, chewing tobacco, and e-cigarettes. If you need help quitting, ask your doctor.  · Keep all follow-up visits as told by your doctor. This is important.  Contact a doctor if:  · The machine that you were given to use during sleep is uncomfortable or does not seem to be working.  · Your symptoms do not get better.  · Your symptoms get worse.  Get help right away if:  · Your chest hurts.  · You have trouble breathing in enough air (shortness of breath).  · You have an  uncomfortable feeling in your back, arms, or stomach.  · You have trouble talking.  · One side of your body feels weak.  · A part of your face is hanging down (drooping).  These symptoms may be an emergency. Do not wait to see if the symptoms will go away. Get medical help right away. Call your local emergency services (911 in the [place] Do not drive yourself to the hospital.  This information is not intended to replace advice given to you by your health care provider. Make sure you discuss any questions you have with your health care provider.  Document Released: 09/26/2009 Document Revised: 08/13/2017 Document Reviewed: 09/26/2016  [employer] Interactive Patient Education © 2018 [employer]

## 2019-02-07 ENCOUNTER — OFFICE VISIT (OUTPATIENT)
Dept: SLEEP MEDICINE | Facility: HOSPITAL | Age: 69
End: 2019-02-07

## 2019-02-07 VITALS
WEIGHT: 152.8 LBS | DIASTOLIC BLOOD PRESSURE: 89 MMHG | HEIGHT: 63 IN | BODY MASS INDEX: 27.07 KG/M2 | SYSTOLIC BLOOD PRESSURE: 139 MMHG | HEART RATE: 59 BPM | OXYGEN SATURATION: 96 %

## 2019-02-07 DIAGNOSIS — G47.34 NOCTURNAL HYPOXEMIA: ICD-10-CM

## 2019-02-07 DIAGNOSIS — G25.81 RESTLESS LEGS SYNDROME (RLS): ICD-10-CM

## 2019-02-07 DIAGNOSIS — G47.33 OSA (OBSTRUCTIVE SLEEP APNEA): Primary | ICD-10-CM

## 2019-02-07 PROBLEM — G47.61 PERIODIC LIMB MOVEMENT DISORDER (PLMD): Status: RESOLVED | Noted: 2018-11-29 | Resolved: 2019-02-07

## 2019-02-07 PROCEDURE — 99213 OFFICE O/P EST LOW 20 MIN: CPT | Performed by: NURSE PRACTITIONER

## 2019-02-07 RX ORDER — ROPINIROLE 0.5 MG/1
TABLET, FILM COATED ORAL
Qty: 180 TABLET | Refills: 3 | Status: SHIPPED | OUTPATIENT
Start: 2019-02-07 | End: 2022-04-27

## 2019-02-07 NOTE — PROGRESS NOTES
Subjective: Follow-up        Chief Complaint:   Chief Complaint   Patient presents with   • Follow-up       HPI:    Harvey Levine is a 68 y.o. male here for follow-up of dominga..plmd, noc hypoxemia  Patient was last seen 12/6/18 for nocturnal hypoxemia, moderate sleep apnea, PLMD.  At this time he was started on Requip 0.5 mg at bedtime.  Patient states this medication has changed his life.  He no longer awakens or feels any takes her jerks during the night.  Intermittently patient will feel the caking and jerking during the day like to have a when necessary dose to take during the day.    Maribell is doing very well on CPAP.  He is sleeping 7 hours nightly and feels very refreshed upon awakening.  His Henderson score is 1/24.  He is doing very well with CPAP settings and has no concerns today.      Current medications are:   Current Outpatient Medications:   •  clobetasol (TEMOVATE) 0.05 % gel, , Disp: , Rfl:   •  ketoconazole (NIZORAL) 2 % cream, , Disp: , Rfl:   •  sertraline (ZOLOFT) 50 MG tablet, Take 50 mg by mouth Daily., Disp: , Rfl:   •  telmisartan (MICARDIS) 80 MG tablet, , Disp: , Rfl:   •  rOPINIRole (REQUIP) 0.5 MG tablet, Take 1 hour before bedtime. And 1 at lunch, Disp: 180 tablet, Rfl: 3  •  tiZANidine (ZANAFLEX) 4 MG tablet, Take 4 mg by mouth Every 8 (Eight) Hours As Needed., Disp: , Rfl: .      The patient's relevant past medical, surgical, family and social history were reviewed and updated in Epic as appropriate.       Review of Systems   Eyes: Positive for visual disturbance.   Respiratory: Positive for apnea.    Gastrointestinal:        Heartburn   Psychiatric/Behavioral: Positive for sleep disturbance.   All other systems reviewed and are negative.        Objective:    Physical Exam   Constitutional: He is oriented to person, place, and time. He appears well-developed and well-nourished.   HENT:   Head: Normocephalic and atraumatic.   Mouth/Throat: Oropharynx is clear and moist.   Mallampati 2  anatomy   Eyes: Conjunctivae are normal.   Neck: Neck supple. No thyromegaly present.   Cardiovascular: Normal rate and regular rhythm.   Pulmonary/Chest: Effort normal and breath sounds normal.   Lymphadenopathy:     He has no cervical adenopathy.   Neurological: He is alert and oriented to person, place, and time.   Skin: Skin is warm and dry.   Psychiatric: He has a normal mood and affect. His behavior is normal. Judgment and thought content normal.   Nursing note and vitals reviewed.    54/56 days of use.  AHI 6 that is down from initial AHI 26.1.  Greater than 4 hour use 85.7%.  90% pressure 9.5 cm H2O.  Download has been reviewed with patient.    ASSESSMENT/PLAN    Harvey was seen today for follow-up.    Diagnoses and all orders for this visit:    HUGH (obstructive sleep apnea)  -     CPAP Therapy  -     Overnight Sleep Oximetry Study; Future    Restless legs syndrome (RLS)    Nocturnal hypoxemia  -     Overnight Sleep Oximetry Study; Future    Other orders  -     rOPINIRole (REQUIP) 0.5 MG tablet; Take 1 hour before bedtime. And 1 at lunch            1. Counseled patient regarding multimodal approach with healthy nutrition, healthy sleep, regular physical activity, social activities, counseling, and medications. Encouraged to practice lateral sleep position. Avoid alcohol and sedatives close to bedtime.  2. Requip 0.5 mg 1 at bedtime and one at noon.  He's been given a three-month supply with 3 refills per his request.  Patient is to continue CPAP therapy.  I have refilled her supplies ×1 year.  He is to return to clinic in one year or sooner symptoms warrant.  Overnight oximetry order while on CPAP therapy I will call patient with these results.  I have reviewed the results of my evaluation and impression and discussed my recommendations in detail with the patient.      Signed by  PAZ Peraza    February 7, 2019      CC: Antonia Conner MD          No ref. provider found

## 2019-02-07 NOTE — PATIENT INSTRUCTIONS
Hypoxemia  Hypoxemia occurs when the blood does not contain enough oxygen. The body cannot work well when it does not have enough oxygen because every part of the body needs oxygen. Oxygen enters the lungs when we breathe in, then it travels to all parts of the body through the blood. Hypoxemia can develop suddenly or slowly.  What are the causes?  Common causes of this condition include:  · Long-term (chronic) lung diseases, such as chronic obstructive pulmonary disease (COPD) or interstitial lung disease.  · Disorders that affect breathing at night, such as sleep apnea.  · Fluid buildup in the lungs (pulmonary edema).  · Lung infection (pneumonia).  · Lung or throat cancer.  · Abnormal blood flow that bypasses the lungs (having a shunt).  · Certain diseases that affect nerves or muscles.  · A collapsed lung (pneumothorax).  · A blood clot in the lungs (pulmonary embolus).  · Certain types of heart disease.  · Slow or shallow breathing (hypoventilation).  · Certain medicines.  · High altitudes.  · Toxic chemicals, smoke, and gases.    What are the signs or symptoms?  In some cases, there may be no symptoms of this condition. If you do have symptoms, they may include:  · Shortness of breath (dyspnea).  · Bluish color of the skin, lips, or nail beds.  · Breathing that is fast, noisy, or shallow.  · A fast heartbeat.  · Feeling tired or sleepy.  · Feeling confused or worried.    If hypoxemia develops quickly, you will likely have dyspnea. If hypoxemia develops slowly over months or years, you may not notice any symptoms.  How is this diagnosed?  This condition is diagnosed by:  · A physical exam.  · Blood tests.  · A test that measures the percentage of oxygen in your blood (pulse oximetry). This is done with a sensor that is placed on your finger, toe, or earlobe.    How is this treated?  Treatment for this condition depends on the underlying cause of your hypoxemia. You will likely be treated with oxygen therapy to  restore your blood oxygen level. Depending on the cause of your hypoxemia, you may need oxygen therapy for a short time (weeks or months), or you may need it for the rest of your life.  Your health care provider may also recommend other therapies to treat the underlying cause of your hypoxemia.  Follow these instructions at home:  · Take over-the-counter and prescription medicines only as told by your health care provider.  · If you are on oxygen therapy, follow oxygen safety precautions as directed by your health care provider. These may include:  ? Always having a backup supply of oxygen.  ? Not allowing anyone to smoke or have a fire around oxygen.  ? Handling oxygen tanks carefully and as instructed.  · Do not use any products that contain nicotine or tobacco, such as cigarettes and e-cigarettes. If you need help quitting, ask your health care provider. Stay away from people who smoke.  · Keep all follow-up visits as told by your health care provider. This is important.  Contact a health care provider if:  · You have any concerns about your oxygen therapy.  · You have trouble breathing, even during or after treatment.  · You become short of breath when you exercise.  · You are tired when you wake up.  · You have a headache when you wake up.  Get help right away if:  · Your shortness of breath gets worse, especially with normal or minimal activity.  · You have a bluish color of the skin, lips, or nail beds.  · You become confused or you cannot think properly.  · You cough up dark mucus or blood.  · You have chest pain.  · You have a fever.  Summary  · Hypoxemia occurs when the blood does not contain enough oxygen.  · Hypoxemia may or may not cause symptoms. Often, the main symptom is shortness of breath (dyspnea).  · Depending on the cause of your hypoxemia, you may need oxygen therapy for a short time (weeks or months), or you may need it for the rest of your life.  · If you are on oxygen therapy, follow oxygen  safety precautions as directed by your health care provider.  This information is not intended to replace advice given to you by your health care provider. Make sure you discuss any questions you have with your health care provider.  Document Released: 07/02/2012 Document Revised: 11/21/2017 Document Reviewed: 11/21/2017  [employer] Interactive Patient Education © 2017 [employer]  Restless Legs Syndrome  Restless legs syndrome is a condition that causes uncomfortable feelings or sensations in the legs, especially while sitting or lying down. The sensations usually cause an overwhelming urge to move the legs. The arms can also sometimes be affected.  The condition can range from mild to severe. The symptoms often interfere with a person's ability to sleep.  What are the causes?  The cause of this condition is not known.  What increases the risk?  This condition is more likely to develop in:  · People who are older than age 50.  · Pregnant women. In general, restless legs syndrome is more common in women than in men.  · People who have a family history of the condition.  · People who have certain medical conditions, such as iron deficiency, kidney disease, Parkinson disease, or nerve damage.  · People who take certain medicines, such as medicines for high blood pressure, nausea, colds, allergies, depression, and some heart conditions.    What are the signs or symptoms?  The main symptom of this condition is uncomfortable sensations in the legs. These sensations may be:  · Described as pulling, tingling, prickling, throbbing, crawling, or burning.  · Worse while you are sitting or lying down.  · Worse during periods of rest or inactivity.  · Worse at night, often interfering with your sleep.  · Accompanied by a very strong urge to move your legs.  · Temporarily relieved by movement of your legs.    The sensations usually affect both sides of the body. The arms can also be affected, but this is rare. People who have  this condition often have tiredness during the day because of their lack of sleep at night.  How is this diagnosed?  This condition may be diagnosed based on your description of the symptoms. You may also have tests, including blood tests, to check for other conditions that may lead to your symptoms. In some cases, you may be asked to spend some time in a sleep lab so your sleeping can be monitored.  How is this treated?  Treatment for this condition is focused on managing the symptoms. Treatment may include:  · Self-help and lifestyle changes.  · Medicines.    Follow these instructions at home:  · Take medicines only as directed by your health care provider.  · Try these methods to get temporary relief from the uncomfortable sensations:  ? Massage your legs.  ? Walk or stretch.  ? Take a cold or hot bath.  · Practice good sleep habits. For example, go to bed and get up at the same time every day.  · Exercise regularly.  · Practice ways of relaxing, such as yoga or meditation.  · Avoid caffeine and alcohol.  · Do not use any tobacco products, including cigarettes, chewing tobacco, or electronic cigarettes. If you need help quitting, ask your health care provider.  · Keep all follow-up visits as directed by your health care provider. This is important.  Contact a health care provider if:  Your symptoms do not improve with treatment, or they get worse.  This information is not intended to replace advice given to you by your health care provider. Make sure you discuss any questions you have with your health care provider.  Document Released: 12/08/2003 Document Revised: 05/25/2017 Document Reviewed: 12/14/2015  [employer] Interactive Patient Education © 2018 [employer]  Sleep Apnea  Sleep apnea is a condition that affects breathing. People with sleep apnea have moments during sleep when their breathing pauses briefly or gets shallow. Sleep apnea can cause these symptoms:  · Trouble staying asleep.  · Sleepiness or  tiredness during the day.  · Irritability.  · Loud snoring.  · Morning headaches.  · Trouble concentrating.  · Forgetting things.  · Less interest in sex.  · Being sleepy for no reason.  · Mood swings.  · Personality changes.  · Depression.  · Waking up a lot during the night to pee (urinate).  · Dry mouth.  · Sore throat.    Follow these instructions at home:  · Make any changes in your routine that your doctor recommends.  · Eat a healthy, well-balanced diet.  · Take over-the-counter and prescription medicines only as told by your doctor.  · Avoid using alcohol, calming medicines (sedatives), and narcotic medicines.  · Take steps to lose weight if you are overweight.  · If you were given a machine (device) to use while you sleep, use it only as told by your doctor.  · Do not use any tobacco products, such as cigarettes, chewing tobacco, and e-cigarettes. If you need help quitting, ask your doctor.  · Keep all follow-up visits as told by your doctor. This is important.  Contact a doctor if:  · The machine that you were given to use during sleep is uncomfortable or does not seem to be working.  · Your symptoms do not get better.  · Your symptoms get worse.  Get help right away if:  · Your chest hurts.  · You have trouble breathing in enough air (shortness of breath).  · You have an uncomfortable feeling in your back, arms, or stomach.  · You have trouble talking.  · One side of your body feels weak.  · A part of your face is hanging down (drooping).  These symptoms may be an emergency. Do not wait to see if the symptoms will go away. Get medical help right away. Call your local emergency services (911 in the [place] Do not drive yourself to the hospital.  This information is not intended to replace advice given to you by your health care provider. Make sure you discuss any questions you have with your health care provider.  Document Released: 09/26/2009 Document Revised: 08/13/2017 Document Reviewed:  09/26/2016  [employer] Interactive Patient Education © 2018 [employer]

## 2019-03-07 ENCOUNTER — OFFICE VISIT (OUTPATIENT)
Dept: ORTHOPEDIC SURGERY | Facility: CLINIC | Age: 69
End: 2019-03-07

## 2019-03-07 VITALS — OXYGEN SATURATION: 98 % | HEART RATE: 57 BPM | WEIGHT: 160.94 LBS | HEIGHT: 63 IN | BODY MASS INDEX: 28.52 KG/M2

## 2019-03-07 DIAGNOSIS — M25.561 PAIN IN BOTH KNEES, UNSPECIFIED CHRONICITY: ICD-10-CM

## 2019-03-07 DIAGNOSIS — M17.0 PRIMARY OSTEOARTHRITIS OF BOTH KNEES: Primary | ICD-10-CM

## 2019-03-07 DIAGNOSIS — M25.562 PAIN IN BOTH KNEES, UNSPECIFIED CHRONICITY: ICD-10-CM

## 2019-03-07 PROCEDURE — 99204 OFFICE O/P NEW MOD 45 MIN: CPT | Performed by: ORTHOPAEDIC SURGERY

## 2019-03-07 PROCEDURE — 20610 DRAIN/INJ JOINT/BURSA W/O US: CPT | Performed by: ORTHOPAEDIC SURGERY

## 2019-03-07 RX ORDER — LIDOCAINE HYDROCHLORIDE 10 MG/ML
3 INJECTION, SOLUTION INFILTRATION; PERINEURAL
Status: COMPLETED | OUTPATIENT
Start: 2019-03-07 | End: 2019-03-07

## 2019-03-07 RX ORDER — BUPIVACAINE HYDROCHLORIDE 2.5 MG/ML
3 INJECTION, SOLUTION INFILTRATION; PERINEURAL
Status: COMPLETED | OUTPATIENT
Start: 2019-03-07 | End: 2019-03-07

## 2019-03-07 RX ORDER — TRIAMCINOLONE ACETONIDE 40 MG/ML
80 INJECTION, SUSPENSION INTRA-ARTICULAR; INTRAMUSCULAR
Status: COMPLETED | OUTPATIENT
Start: 2019-03-07 | End: 2019-03-07

## 2019-03-07 RX ADMIN — BUPIVACAINE HYDROCHLORIDE 3 ML: 2.5 INJECTION, SOLUTION INFILTRATION; PERINEURAL at 14:28

## 2019-03-07 RX ADMIN — TRIAMCINOLONE ACETONIDE 80 MG: 40 INJECTION, SUSPENSION INTRA-ARTICULAR; INTRAMUSCULAR at 14:28

## 2019-03-07 RX ADMIN — LIDOCAINE HYDROCHLORIDE 3 ML: 10 INJECTION, SOLUTION INFILTRATION; PERINEURAL at 14:28

## 2019-03-07 NOTE — PROGRESS NOTES
Answers for HPI/ROS submitted by the patient on 3/5/2019   Lower extremity pain  Incident occurred: 5 to 7 days ago  Incident location: at home  Injury mechanism: a fall  Pain location: left knee, right knee  Pain quality: stabbing  Pain - numeric: 7/10  Pain course: intermittent  loss of motion: Yes  muscle weakness: Yes  Foreign body present: no foreign bodies  Aggravated by: weight bearing

## 2019-03-07 NOTE — PROGRESS NOTES
Orthopaedic Clinic Note: Knee New Patient    Chief Complaint   Patient presents with   • Right Knee - Pain     Bilateral knee pain. Right knee hurts worse. Pain present for around two weeks. Both knees slightly swollen.    • Left Knee - Pain        HPI    Harvey Levine is a 68 y.o. male who presents with bilateral knee pain for several years but is gotten progressively worse over the past 2-3 weeks.  He rates his pain 5/10 on the pain scale.  He is complaining of swelling and stiffness in the bilateral knees.  His pain is localized globally throughout both knees but primarily along the anterior medial joint lines.  His pain is worse with walking, standing, weightbearing activities.  Sitting and resting improves his pain as well as occasional anti-inflammatories.  He describes the pain as a constant ache and throb with associated swelling and stiffness.  Apart from anti-inflammatories, no other interventions have been tried today.  He is here today to discuss treatment options for his ongoing knee pain as he is having limitations in daily activities as a result of the pain.  He remains active in terms of exercise and does cycling and rowing on a daily basis but has had to cut back on this exercise secondary to his pain    Past Medical History:   Diagnosis Date   • Arthritis    • Hypertension    • MVP (mitral valve prolapse)       Past Surgical History:   Procedure Laterality Date   • COLONOSCOPY     • ENDOSCOPY  09/29/2017    Dr. Medrano   • HERNIA REPAIR     • LIVER BIOPSY  02/20/2012   • MOUTH BIOPSY  05/25/2011   • SKIN BIOPSY  2018    Dr. Héctor Bernal   • VASECTOMY  01/01/1992     Urology      Family History   Problem Relation Age of Onset   • Breast cancer Mother    • Pancreatic cancer Mother    • Stomach cancer Maternal Grandmother    • Heart failure Father      Social History     Socioeconomic History   • Marital status:      Spouse name: Not on file   • Number of children: Not on file   • Years of  education: Not on file   • Highest education level: Not on file   Social Needs   • Financial resource strain: Not on file   • Food insecurity - worry: Not on file   • Food insecurity - inability: Not on file   • Transportation needs - medical: Not on file   • Transportation needs - non-medical: Not on file   Occupational History   • Not on file   Tobacco Use   • Smoking status: Never Smoker   • Smokeless tobacco: Never Used   Substance and Sexual Activity   • Alcohol use: Yes     Alcohol/week: 6.0 oz     Types: 10 Glasses of wine per week   • Drug use: No   • Sexual activity: Defer   Other Topics Concern   • Not on file   Social History Narrative        Retired from IT    Second hand smoke exposure at work and as a child.    Drinks about 10 alcoholic drinks a week.       Current Outpatient Medications on File Prior to Visit   Medication Sig Dispense Refill   • clobetasol (TEMOVATE) 0.05 % gel      • ketoconazole (NIZORAL) 2 % cream      • rOPINIRole (REQUIP) 0.5 MG tablet Take 1 hour before bedtime. And 1 at lunch 180 tablet 3   • sertraline (ZOLOFT) 50 MG tablet Take 50 mg by mouth Daily.     • telmisartan (MICARDIS) 80 MG tablet      • tiZANidine (ZANAFLEX) 4 MG tablet Take 4 mg by mouth Every 8 (Eight) Hours As Needed.       No current facility-administered medications on file prior to visit.       Allergies   Allergen Reactions   • Adhesive Tape Other (See Comments)     Tearing of the skin   • Crestor [Rosuvastatin Calcium] Other (See Comments)     Liver failure   • Lipitor [Atorvastatin] Other (See Comments)     Liver failure   • Lisinopril Other (See Comments)     Liver failure   • Zetia [Ezetimibe] Other (See Comments)     Liver failure        Review of Systems   Constitutional: Positive for activity change and fatigue.   HENT: Positive for hearing loss and mouth sores.    Eyes: Negative.    Respiratory: Positive for apnea.    Cardiovascular: Negative.    Gastrointestinal: Positive for constipation.  "  Endocrine: Negative.    Genitourinary: Negative.    Musculoskeletal: Positive for arthralgias (bilateral knee), gait problem and joint swelling.   Skin: Positive for rash.   Allergic/Immunologic: Negative.    Hematological: Negative.    Psychiatric/Behavioral: Negative.         The following portions of the patient's history were reviewed and updated as appropriate: allergies, current medications, past family history, past medical history, past social history, past surgical history and problem list.    Physical Exam  Pulse 57, height 160 cm (62.99\"), weight 73 kg (160 lb 15 oz), SpO2 98 %.    Body mass index is 28.52 kg/m².    GENERAL APPEARANCE: awake, alert & oriented x 3, in no acute distress and well developed, well nourished  PSYCH: normal affect  LUNGS:  breathing nonlabored  EYES: sclera anicteric  CARDIOVASCULAR: palpable dorsalis pedis, palpable posterior tibial bilaterally. Capillary refill less than 2 seconds  EXTREMITIES: no clubbing, cyanosis  GAIT:  Antalgic            Right Lower Extremity Exam:   ----------  Hip Exam  ----------  FLEXION CONTRACTURE: None  FLEXION: 110 degrees  INTERNAL ROTATION: 20 degrees at 90 degrees of flexion   EXTERNAL ROTATION: 40 degrees at 90 degrees of flexion    PAIN WITH HIP MOTION: no  ----------  Knee Exam  ----------  ALIGNMENT: neutral    RANGE OF MOTION:  Decreased (3 - 120 degrees) with no extensor lag  LIGAMENTOUS STABILITY:   stable to varus and valgus stress at terminal extension and 30 degrees without any evidence of laxity     STRENGTH:  5/5 knee flexion, extension. 5/5 ankle dorsiflexion and plantarflexion.     PAIN WITH PALPATION: medial joint line, lateral joint line and anterior knee  KNEE EFFUSION: yes, moderate effusion  PAIN WITH KNEE ROM: yes, global  PATELLAR CREPITUS: yes, painful and symptomatic  SPECIAL EXAM FINDINGS:  painful patellar compression    REFLEXES:  PATELLAR 2+/4  ACHILLES 2+/4    CLONUS: no  STRAIGHT LEG TEST:   negative    SENSATION " TO LIGHT TOUCH:  DEEP PERONEAL/SUPERFICIAL PERONEAL/SURAL/SAPHENOUS/TIBIAL:   intact    EDEMA:  no  ERYTHEMA:  no  WOUNDS/INCISIONS: no        Left Lower Extremity Exam:   ----------  Hip Exam  ----------  FLEXION CONTRACTURE: None  FLEXION: 110 degrees  INTERNAL ROTATION: 20 degrees at 90 degrees of flexion   EXTERNAL ROTATION: 40 degrees at 90 degrees of flexion    PAIN WITH HIP MOTION: no  ----------  Knee Exam  ----------  ALIGNMENT: Slight valgus, correctable to neutral    RANGE OF MOTION:  Decreased (0 - 115 degrees) with no extensor lag  LIGAMENTOUS STABILITY:   stable to varus and valgus stress at terminal extension and 30 degrees; slight retensioning of the LCL is appreciated with varus stress at 30 degrees consistent with lateral compartment degeneration     STRENGTH:  5/5 knee flexion, extension. 5/5 ankle dorsiflexion and plantarflexion.     PAIN WITH PALPATION: medial joint line, lateral joint line and anterior knee  KNEE EFFUSION: yes, trace effusion  PAIN WITH KNEE ROM: yes, global   PATELLAR CREPITUS: yes, painful and symptomatic  SPECIAL EXAM FINDINGS:  painful patellar compression    REFLEXES:  PATELLAR 2+/4  ACHILLES 2+/4    CLONUS: no  STRAIGHT LEG TEST:   negative    SENSATION TO LIGHT TOUCH:  DEEP PERONEAL/SUPERFICIAL PERONEAL/SURAL/SAPHENOUS/TIBIAL:   intact    EDEMA:  no  ERYTHEMA:  no  WOUNDS/INCISIONS: no      ______________________________________________________________________  ______________________________________________________________________    RADIOGRAPHIC FINDINGS:   Indication: Bilateral knee pain    Comparison: No prior xrays are available for comparison    Bilateral knee(s) 4 views: Right knee demonstrates moderate to severe tricompartmental arthritic changes with neutral alignment and periarticular osteophytes visualized in all compartments.  Left knee demonstrates moderate to severe tricompartmental arthritis with genu valgum alignment, periarticular osteophytes visualized  in all compartments.  Bilateral patellofemoral compartments demonstrate bone-on-bone articulation lateral facet.      Assessment/Plan:   Diagnosis Plan   1. Primary osteoarthritis of both knees  Large Joint Arthrocentesis: L knee    Large Joint Arthrocentesis: L knee and injection    2. Pain in both knees, unspecified chronicity  XR Knee 4+ View Bilateral     Patient symptoms are related to bilateral severe osteoarthritis of the knees.  I discussed surgical nonsurgical treatment options.  Patient is not interested in pursuing surgery at this time.  As a result, I recommended conservative treatment with bilateral knee cortisone injections.  Given the amount of fluid in his right knee, recommended aspiration of the knee before proceeding with injection.  He is agreeable to this.  I will see him back in 3 months for repeat assessment.    Procedure Note:  I discussed with the patient the potential benefits of performing a therapeutic aspiration and injection of the right knee as well as potential risks including but not limited to infection, swelling, pain, bleeding, bruising, nerve/vessel damage, skin color changes, transient elevation in blood glucose levels, and fat atrophy. After informed consent and after the area was prepped with alcohol, ethyl chloride was used to numb the skin. Via the superolateral approach, an 18-gauge needle was inserted into the knee joint and 20 cc of clear yellow joint fluid were aspirated.  Then, 3cc of 1% lidocaine, 3cc of 0.25% marcaine and 2 cc of 40mg/ml of Kenalog were injected into the right knee. The patient tolerated the procedure well. There were no complications. A sterile dressing was placed over the injection site.    Procedure Note:  I discussed with the patient the potential benefits of performing a therapeutic injection of the left knee as well as potential risks including but not limited to infection, swelling, pain, bleeding, bruising, nerve/vessel damage, skin color  changes, transient elevation in blood glucose levels, and fat atrophy. After informed consent and after the area was prepped with alcohol, ethyl chloride was used to numb the skin. Via the superolateral approach, 3cc of 1% lidocaine, 3cc of 0.25% marcaine and 2 cc of 40mg/ml of Kenalog were injected into the left knee. The patient tolerated the procedure well. There were no complications. A sterile dressing was placed over the injection site.        Luis Harkins MD  03/07/19  2:40 PM        Answers for HPI/ROS submitted by the patient on 3/5/2019   Lower extremity pain  Incident occurred: 5 to 7 days ago  Incident location: at home  Injury mechanism: a fall  Pain location: left knee, right knee  Pain quality: stabbing  Pain - numeric: 7/10  Pain course: intermittent  loss of motion: Yes  muscle weakness: Yes  Foreign body present: no foreign bodies  Aggravated by: weight bearing

## 2019-03-07 NOTE — PROGRESS NOTES
Procedure   Large Joint Arthrocentesis: L knee  Date/Time: 3/7/2019 2:28 PM  Consent given by: patient  Site marked: site marked  Timeout: Immediately prior to procedure a time out was called to verify the correct patient, procedure, equipment, support staff and site/side marked as required   Supporting Documentation  Indications: pain   Procedure Details  Location: knee - L knee  Preparation: Patient was prepped and draped in the usual sterile fashion  Needle size: 22 G  Approach: anterolateral  Medications administered: 3 mL lidocaine 1 %; 80 mg triamcinolone acetonide 40 MG/ML; 3 mL bupivacaine 0.25 %  Patient tolerance: patient tolerated the procedure well with no immediate complications    Large Joint Arthrocentesis: L knee and injection   Date/Time: 3/7/2019 2:28 PM  Consent given by: patient  Site marked: site marked  Timeout: Immediately prior to procedure a time out was called to verify the correct patient, procedure, equipment, support staff and site/side marked as required   Supporting Documentation  Indications: pain   Procedure Details  Location: knee - L knee  Preparation: Patient was prepped and draped in the usual sterile fashion  Needle size: 22 G  Approach: anterolateral  Medications administered: 3 mL bupivacaine 0.25 %; 3 mL lidocaine 1 %; 80 mg triamcinolone acetonide 40 MG/ML  Aspirate amount: 20 mL  Aspirate: clear and yellow  Patient tolerance: patient tolerated the procedure well with no immediate complications

## 2019-06-11 ENCOUNTER — OFFICE VISIT (OUTPATIENT)
Dept: ORTHOPEDIC SURGERY | Facility: CLINIC | Age: 69
End: 2019-06-11

## 2019-06-11 VITALS — BODY MASS INDEX: 26.45 KG/M2 | HEART RATE: 86 BPM | OXYGEN SATURATION: 98 % | WEIGHT: 149.25 LBS | HEIGHT: 63 IN

## 2019-06-11 DIAGNOSIS — M17.0 PRIMARY OSTEOARTHRITIS OF BOTH KNEES: Primary | ICD-10-CM

## 2019-06-11 PROCEDURE — 99213 OFFICE O/P EST LOW 20 MIN: CPT | Performed by: ORTHOPAEDIC SURGERY

## 2019-06-11 PROCEDURE — 20610 DRAIN/INJ JOINT/BURSA W/O US: CPT | Performed by: ORTHOPAEDIC SURGERY

## 2019-06-11 RX ORDER — LIDOCAINE HYDROCHLORIDE 10 MG/ML
3 INJECTION, SOLUTION EPIDURAL; INFILTRATION; INTRACAUDAL; PERINEURAL
Status: COMPLETED | OUTPATIENT
Start: 2019-06-11 | End: 2019-06-11

## 2019-06-11 RX ORDER — ALPRAZOLAM 0.25 MG/1
0.25 TABLET ORAL 2 TIMES DAILY PRN
COMMUNITY
Start: 2019-05-15 | End: 2022-09-30

## 2019-06-11 RX ORDER — AMLODIPINE BESYLATE 2.5 MG/1
TABLET ORAL
COMMUNITY
Start: 2019-03-19 | End: 2019-10-24 | Stop reason: HOSPADM

## 2019-06-11 RX ORDER — AMLODIPINE BESYLATE 10 MG/1
10 TABLET ORAL DAILY
COMMUNITY
Start: 2019-05-10

## 2019-06-11 RX ORDER — TRIAMCINOLONE ACETONIDE 40 MG/ML
80 INJECTION, SUSPENSION INTRA-ARTICULAR; INTRAMUSCULAR
Status: COMPLETED | OUTPATIENT
Start: 2019-06-11 | End: 2019-06-11

## 2019-06-11 RX ADMIN — LIDOCAINE HYDROCHLORIDE 3 ML: 10 INJECTION, SOLUTION EPIDURAL; INFILTRATION; INTRACAUDAL; PERINEURAL at 14:19

## 2019-06-11 RX ADMIN — TRIAMCINOLONE ACETONIDE 80 MG: 40 INJECTION, SUSPENSION INTRA-ARTICULAR; INTRAMUSCULAR at 14:19

## 2019-06-11 NOTE — PROGRESS NOTES
Orthopaedic Clinic Note: Knee Established Patient    Chief Complaint   Patient presents with   • Left Knee - Follow-up     Primary Osteoarthritis of Both Knees   Bilateral Knee Cortisone Injection given 03/07/2019  3 month f/u   • Right Knee - Follow-up     Primary Osteoarthritis of Both Knees   Bilateral Knee Cortisone Injection given 03/07/2019  3 month f/u        HPI    It has been 3  month(s) since Mr. Levine's last visit. He returns to clinic today for follow-up bilateral knee osteoarthritis.  He received bilateral knee cortisone injections 3 months ago.  The injections provided excellent relief.  The left knee is continuing to function well with minimal pain.  The right knee received improvement for about a month before his pain gradually returned.  He rates his pain 6/10 on the pain scale.  He is ambulating with no assistive device.  He denies fevers chills or constitutional symptoms.  Overall, his left knee is doing better, right knee is doing about the same.    Past Medical History:   Diagnosis Date   • Arthritis    • Hypertension    • MVP (mitral valve prolapse)       Past Surgical History:   Procedure Laterality Date   • COLONOSCOPY     • ENDOSCOPY  09/29/2017    Dr. Medrano   • HERNIA REPAIR     • LIVER BIOPSY  02/20/2012   • MOUTH BIOPSY  05/25/2011   • SKIN BIOPSY  2018    Dr. Héctor Bernal   • VASECTOMY  01/01/1992     Urology      Family History   Problem Relation Age of Onset   • Breast cancer Mother    • Pancreatic cancer Mother    • Stomach cancer Maternal Grandmother    • Heart failure Father      Social History     Socioeconomic History   • Marital status:      Spouse name: Not on file   • Number of children: Not on file   • Years of education: Not on file   • Highest education level: Not on file   Tobacco Use   • Smoking status: Never Smoker   • Smokeless tobacco: Never Used   Substance and Sexual Activity   • Alcohol use: Yes     Alcohol/week: 6.0 oz     Types: 10 Glasses of wine per week  "  • Drug use: No   • Sexual activity: Defer   Social History Narrative        Retired from IT    Second hand smoke exposure at work and as a child.    Drinks about 10 alcoholic drinks a week.       Current Outpatient Medications on File Prior to Visit   Medication Sig Dispense Refill   • ALPRAZolam (XANAX) 0.25 MG tablet      • amLODIPine (NORVASC) 10 MG tablet      • amLODIPine (NORVASC) 2.5 MG tablet      • clobetasol (TEMOVATE) 0.05 % gel      • ketoconazole (NIZORAL) 2 % cream      • rOPINIRole (REQUIP) 0.5 MG tablet Take 1 hour before bedtime. And 1 at lunch 180 tablet 3   • sertraline (ZOLOFT) 50 MG tablet Take 50 mg by mouth Daily.     • telmisartan (MICARDIS) 80 MG tablet      • tiZANidine (ZANAFLEX) 4 MG tablet Take 4 mg by mouth Every 8 (Eight) Hours As Needed.       No current facility-administered medications on file prior to visit.       Allergies   Allergen Reactions   • Adhesive Tape Other (See Comments)     Tearing of the skin   • Crestor [Rosuvastatin Calcium] Other (See Comments)     Liver failure   • Lipitor [Atorvastatin] Other (See Comments)     Liver failure   • Lisinopril Other (See Comments)     Liver failure   • Zetia [Ezetimibe] Other (See Comments)     Liver failure        Review of Systems   Constitutional: Negative.    HENT: Positive for tinnitus.    Eyes: Negative.    Respiratory: Negative.    Cardiovascular: Positive for leg swelling.   Gastrointestinal: Positive for constipation.   Endocrine: Negative.    Genitourinary: Negative.    Musculoskeletal: Positive for joint swelling.   Skin: Negative.    Allergic/Immunologic: Negative.    Neurological: Negative.    Hematological: Negative.    Psychiatric/Behavioral: Negative.         The patient's Review of Systems was personally reviewed and confirmed as accurate.    Physical Exam  Pulse 86, height 160 cm (62.99\"), weight 67.7 kg (149 lb 4 oz), SpO2 98 %.    Body mass index is 26.45 kg/m².    GENERAL APPEARANCE: awake, alert, " oriented, in no acute distress and well developed, well nourished  LUNGS:  breathing nonlabored  EXTREMITIES: no clubbing, cyanosis  PERIPHERAL PULSES: palpable dorsalis pedis and posterior tibial pulses bilaterally.    GAIT:  Antalgic        ----------  Right Knee Exam:  ----------  ALIGNMENT: neutral  ----------  RANGE OF MOTION:  Decreased (3 - 115 degrees) with no extensor lag  LIGAMENTOUS STABILITY:   stable to varus and valgus stress at terminal extension and 30 degrees without any evidence of laxity  ----------  STRENGTH:  KNEE FLEXION 5/5  KNEE EXTENSION  5/5  ANKLE DORSIFLEXION  5/5  ANKLE PLANTARFLEXION  5/5  ----------  PAIN WITH PALPATION:global  KNEE EFFUSION: yes, trace effusion  PAIN WITH KNEE ROM: yes  PATELLAR CREPITUS:  yes, painful and symptomatic  ----------  SENSATION TO LIGHT TOUCH:  DEEP PERONEAL/SUPERFICIAL PERONEAL/SURAL/SAPHENOUS/TIBIAL:    intact  ----------  EDEMA:  no  ERYTHEMA:    no  WOUNDS/INCISIONS:  no  _____________________________________________________________________  _____________________________________________________________________    RADIOGRAPHIC FINDINGS:   No new imaging today    Assessment/Plan:   Diagnosis Plan   1. Primary osteoarthritis of both knees       The patient has failed conservative treatment measures and is a candidate for total joint arthroplasty.  I discussed the total joint surgical process as well as the recovery and rehabilitation time frame.  The patient asked several questions regarding the total joint arthroplasty surgery, which were answered accordingly.  Ultimately, the patient declines surgical intervention at this time and wishes to continue with conservative treatment measures.  Alternative conservative treatment measures were discussed including bracing, therapy, topical/oral anti-inflammatories, activity modification, and weight loss.  The patient considered these treatment options and wishes to proceed with corticosteroid injection(s)  today.  Therefore we will proceed with corticosteroid injection(s) today.  Follow-up 3 months for repeat evaluation with x-ray 4 views right knee and discussion of surgery.    Procedure Note:  I discussed with the patient the potential benefits of performing a therapeutic injection of the right knee as well as potential risks including but not limited to infection, swelling, pain, bleeding, bruising, nerve/vessel damage, skin color changes, transient elevation in blood glucose levels, and fat atrophy. After informed consent and after the area was prepped with alcohol, ethyl chloride was used to numb the skin. Via the superolateral approach, 3cc of 1% lidocaine, 3cc of 0.25% marcaine and 2 cc of 40mg/ml of Kenalog were injected into the right knee. The patient tolerated the procedure well. There were no complications. A sterile dressing was placed over the injection site.        Luis Harkins MD  06/11/19  2:21 PM

## 2019-06-11 NOTE — PROGRESS NOTES
Procedure   Large Joint Arthrocentesis: R knee  Date/Time: 6/11/2019 2:19 PM  Consent given by: patient  Site marked: site marked  Timeout: Immediately prior to procedure a time out was called to verify the correct patient, procedure, equipment, support staff and site/side marked as required   Supporting Documentation  Indications: pain   Procedure Details  Location: knee - R knee  Preparation: Patient was prepped and draped in the usual sterile fashion  Needle size: 22 G  Approach: 3cc 0.25% Bupivacaine NDC: 4994-1498-44 LOT:-DK EXP:10/01/20.  Medications administered: 3 mL lidocaine PF 1% 1 %; 80 mg triamcinolone acetonide 40 MG/ML  Patient tolerance: patient tolerated the procedure well with no immediate complications

## 2019-09-10 ENCOUNTER — OFFICE VISIT (OUTPATIENT)
Dept: ORTHOPEDIC SURGERY | Facility: CLINIC | Age: 69
End: 2019-09-10

## 2019-09-10 VITALS — WEIGHT: 152 LBS | BODY MASS INDEX: 26.93 KG/M2 | HEART RATE: 62 BPM | HEIGHT: 63 IN | OXYGEN SATURATION: 98 %

## 2019-09-10 DIAGNOSIS — M17.11 PRIMARY OSTEOARTHRITIS OF RIGHT KNEE: Primary | ICD-10-CM

## 2019-09-10 PROCEDURE — 99213 OFFICE O/P EST LOW 20 MIN: CPT | Performed by: ORTHOPAEDIC SURGERY

## 2019-09-10 RX ORDER — OXYCODONE HCL 10 MG/1
10 TABLET, FILM COATED, EXTENDED RELEASE ORAL ONCE
Status: CANCELLED | OUTPATIENT
Start: 2019-09-10 | End: 2019-09-10

## 2019-09-10 RX ORDER — ACETAMINOPHEN 325 MG/1
1000 TABLET ORAL ONCE
Status: CANCELLED | OUTPATIENT
Start: 2019-09-10 | End: 2019-09-10

## 2019-09-10 RX ORDER — MELOXICAM 7.5 MG/1
15 TABLET ORAL ONCE
Status: CANCELLED | OUTPATIENT
Start: 2019-09-10 | End: 2019-09-10

## 2019-09-10 NOTE — PROGRESS NOTES
Orthopaedic Clinic Note: Knee Established Patient    Chief Complaint   Patient presents with   • Follow-up     3 month recheck - Primary osteoarthritis of both knees        HPI    It has been 2  month(s) since Mr. Levine's last visit. He returns to clinic today for follow-up bilateral knee osteoarthritis.  He received cortisone injections both knees 3 months ago.  The left knee injection provided some good relief.  The right knee injection only provided a few weeks of relief before his pain returned.  He continues to have pain localized anterior medial aspect of the right knee.  He rates his pain a 4/10 on the pain scale.  Walking weightbearing increases pain.  He is having constant dull aching sensation that is especially worse at night.  Despite anti-inflammatory treatment, injections, bracing, and exercise modification, he is still having limitations in daily activities and is requesting further intervention.    Past Medical History:   Diagnosis Date   • Arthritis    • Hypertension    • MVP (mitral valve prolapse)       Past Surgical History:   Procedure Laterality Date   • COLONOSCOPY     • ENDOSCOPY  09/29/2017    Dr. Medrano   • HERNIA REPAIR     • LIVER BIOPSY  02/20/2012   • MOUTH BIOPSY  05/25/2011   • SKIN BIOPSY  2018    Dr. Héctor Bernal   • VASECTOMY  01/01/1992     Urology      Family History   Problem Relation Age of Onset   • Breast cancer Mother    • Pancreatic cancer Mother    • Stomach cancer Maternal Grandmother    • Heart failure Father      Social History     Socioeconomic History   • Marital status:      Spouse name: Not on file   • Number of children: Not on file   • Years of education: Not on file   • Highest education level: Not on file   Tobacco Use   • Smoking status: Never Smoker   • Smokeless tobacco: Never Used   Substance and Sexual Activity   • Alcohol use: Yes     Alcohol/week: 6.0 oz     Types: 10 Glasses of wine per week   • Drug use: No   • Sexual activity: Defer   Social  "History Narrative        Retired from IT    Second hand smoke exposure at work and as a child.    Drinks about 10 alcoholic drinks a week.       Current Outpatient Medications on File Prior to Visit   Medication Sig Dispense Refill   • ALPRAZolam (XANAX) 0.25 MG tablet      • amLODIPine (NORVASC) 10 MG tablet      • amLODIPine (NORVASC) 2.5 MG tablet      • clobetasol (TEMOVATE) 0.05 % gel      • ketoconazole (NIZORAL) 2 % cream      • rOPINIRole (REQUIP) 0.5 MG tablet Take 1 hour before bedtime. And 1 at lunch 180 tablet 3   • sertraline (ZOLOFT) 50 MG tablet Take 50 mg by mouth Daily.     • telmisartan (MICARDIS) 80 MG tablet      • tiZANidine (ZANAFLEX) 4 MG tablet Take 4 mg by mouth Every 8 (Eight) Hours As Needed.       No current facility-administered medications on file prior to visit.       Allergies   Allergen Reactions   • Adhesive Tape Other (See Comments)     Tearing of the skin   • Crestor [Rosuvastatin Calcium] Other (See Comments)     Liver failure   • Lipitor [Atorvastatin] Other (See Comments)     Liver failure   • Lisinopril Other (See Comments)     Liver failure   • Zetia [Ezetimibe] Other (See Comments)     Liver failure        Review of Systems   Constitutional: Negative.    HENT: Negative.    Eyes: Negative.    Respiratory: Negative.    Cardiovascular: Negative.    Gastrointestinal: Negative.    Endocrine: Negative.    Genitourinary: Negative.    Musculoskeletal: Positive for arthralgias.   Skin: Negative.    Allergic/Immunologic: Negative.    Neurological: Negative.    Hematological: Negative.    Psychiatric/Behavioral: Negative.         The patient's Review of Systems was personally reviewed and confirmed as accurate.    Physical Exam  Pulse 62, height 160 cm (62.99\"), weight 68.9 kg (152 lb), SpO2 98 %.    Body mass index is 26.93 kg/m².    GENERAL APPEARANCE: awake, alert, oriented, in no acute distress and well developed, well nourished  LUNGS:  breathing nonlabored  EXTREMITIES: no " clubbing, cyanosis  PERIPHERAL PULSES: palpable dorsalis pedis and posterior tibial pulses bilaterally.    GAIT:  Antalgic        ----------  Right Knee Exam:  ----------  ALIGNMENT: neutral  ----------  RANGE OF MOTION:  Decreased (3 - 115 degrees) with no extensor lag  LIGAMENTOUS STABILITY:   stable to varus and valgus stress at terminal extension and 30 degrees without any evidence of laxity  ----------  STRENGTH:  KNEE FLEXION 5/5  KNEE EXTENSION  5/5  ANKLE DORSIFLEXION  5/5  ANKLE PLANTARFLEXION  5/5  ----------  PAIN WITH PALPATION:global  KNEE EFFUSION: yes, mild effusion  PAIN WITH KNEE ROM: yes  PATELLAR CREPITUS:  yes, painful and symptomatic  ----------  SENSATION TO LIGHT TOUCH:  DEEP PERONEAL/SUPERFICIAL PERONEAL/SURAL/SAPHENOUS/TIBIAL:    intact  ----------  EDEMA:  no  ERYTHEMA:    no  WOUNDS/INCISIONS:  no  _____________________________________________________________________  _____________________________________________________________________    RADIOGRAPHIC FINDINGS:   Indication: Right knee pain    Comparison: Todays xrays were compared to previous xrays from 3/7/2019    Knee films: Moderate medial lateral compartment arthritis with chondrocalcinosis and small periarticular osteophytes visualized medial lateral compartments.  Patellofemoral compartment is bone-on-bone articulation demonstrating end-stage arthritis.    Assessment/Plan:   Diagnosis Plan   1. Primary osteoarthritis of right knee  XR Knee 4+ View Right    Case Request    CBC and Differential    Basic metabolic panel    Protime-INR    APTT    Hemoglobin A1c    Urinalysis With Culture If Indicated -    ECG 12 Lead    Nicotine & Metabolite, Quant    Tranexamic Acid 1,000 mg in sodium chloride 0.9 % 100 mL    Tranexamic Acid 1,000 mg in sodium chloride 0.9 % 100 mL    ceFAZolin (ANCEF) 2 g in sodium chloride 0.9 % 100 mL IVPB    acetaminophen (TYLENOL) tablet 975 mg    meloxicam (MOBIC) tablet 15 mg    mupirocin (BACTROBAN) 2 % nasal  ointment 1 application    oxyCODONE (oxyCONTIN) 12 hr tablet 10 mg    Case Request     The patient has clinical and radiographic evidence of end-stage right knee joint degeneration. Conservative measures have been tried for 3 months or longer, but have failed to adequately treat or improve the patient's symptoms. Pain is restricting the patient's daily activities as well as quality of life. The recommendation at this time is to proceed with a right total knee arthroplasty with the goal to improve patient function and pain. The risks, benefits, potential complications, and alternatives were discussed with the patient in detail. Risks included but were not limited to bleeding, infection, anesthesia risks, damage to neurovascular structures, osteolysis, aseptic loosening, instability, dislocation, pain, continued pain, iatrogenic fracture, possible need for future surgery including the potential for amputation, blood clots, myocardial infarction, stroke, and death. Lorie-operative blood management and the potential for blood transfusion were discussed with risks and options clearly outlined. Specific details of the surgical procedure, hospitalization, recovery, rehabilitation, and long-term precautions were also presented. Pre-operative teaching was provided. Implant/prosthesis selection was outlined, and the many options available were explained; the final choice will be made at the time of the procedure to match the anatomy and condition of the bone, ligaments, tendons, and muscles. Given this instruction, the patient elected to proceed with the right total knee arthroplasty. The patient will be seen by pre-admission testing for pre-operative optimization and risk assessment and will be scheduled for surgery once this is completed.    The patient is considered standard risk for DVT based on patient risk factors and will be placed on aspirin postoperatively for DVT prophylaxis.        Luis Harkins,  MD  09/10/19  2:31 PM

## 2019-09-13 PROBLEM — M17.11 PRIMARY OSTEOARTHRITIS OF RIGHT KNEE: Status: ACTIVE | Noted: 2019-09-13

## 2019-10-10 ENCOUNTER — APPOINTMENT (OUTPATIENT)
Dept: PREADMISSION TESTING | Facility: HOSPITAL | Age: 69
End: 2019-10-10

## 2019-10-10 VITALS — WEIGHT: 153.66 LBS | HEIGHT: 62 IN | BODY MASS INDEX: 28.28 KG/M2

## 2019-10-10 DIAGNOSIS — M17.11 PRIMARY OSTEOARTHRITIS OF RIGHT KNEE: ICD-10-CM

## 2019-10-10 LAB
ANION GAP SERPL CALCULATED.3IONS-SCNC: 12 MMOL/L (ref 5–15)
APTT PPP: 36.9 SECONDS (ref 24–37)
BASOPHILS # BLD AUTO: 0.02 10*3/MM3 (ref 0–0.2)
BASOPHILS NFR BLD AUTO: 0.3 % (ref 0–1.5)
BUN BLD-MCNC: 20 MG/DL (ref 8–23)
BUN/CREAT SERPL: 25 (ref 7–25)
CALCIUM SPEC-SCNC: 9.9 MG/DL (ref 8.6–10.5)
CHLORIDE SERPL-SCNC: 97 MMOL/L (ref 98–107)
CO2 SERPL-SCNC: 28 MMOL/L (ref 22–29)
CREAT BLD-MCNC: 0.8 MG/DL (ref 0.76–1.27)
DEPRECATED RDW RBC AUTO: 45.9 FL (ref 37–54)
EOSINOPHIL # BLD AUTO: 0.16 10*3/MM3 (ref 0–0.4)
EOSINOPHIL NFR BLD AUTO: 2.4 % (ref 0.3–6.2)
ERYTHROCYTE [DISTWIDTH] IN BLOOD BY AUTOMATED COUNT: 13.5 % (ref 12.3–15.4)
GFR SERPL CREATININE-BSD FRML MDRD: 95 ML/MIN/1.73
GLUCOSE BLD-MCNC: 96 MG/DL (ref 65–99)
HBA1C MFR BLD: 5.3 % (ref 4.8–5.6)
HCT VFR BLD AUTO: 53.5 % (ref 37.5–51)
HGB BLD-MCNC: 17.4 G/DL (ref 13–17.7)
IMM GRANULOCYTES # BLD AUTO: 0.02 10*3/MM3 (ref 0–0.05)
IMM GRANULOCYTES NFR BLD AUTO: 0.3 % (ref 0–0.5)
INR PPP: 0.99 (ref 0.85–1.16)
LYMPHOCYTES # BLD AUTO: 1.53 10*3/MM3 (ref 0.7–3.1)
LYMPHOCYTES NFR BLD AUTO: 23.1 % (ref 19.6–45.3)
MCH RBC QN AUTO: 29.8 PG (ref 26.6–33)
MCHC RBC AUTO-ENTMCNC: 32.5 G/DL (ref 31.5–35.7)
MCV RBC AUTO: 91.6 FL (ref 79–97)
MONOCYTES # BLD AUTO: 0.56 10*3/MM3 (ref 0.1–0.9)
MONOCYTES NFR BLD AUTO: 8.5 % (ref 5–12)
NEUTROPHILS # BLD AUTO: 4.33 10*3/MM3 (ref 1.7–7)
NEUTROPHILS NFR BLD AUTO: 65.4 % (ref 42.7–76)
NRBC BLD AUTO-RTO: 0 /100 WBC (ref 0–0.2)
PLATELET # BLD AUTO: 199 10*3/MM3 (ref 140–450)
PMV BLD AUTO: 9.3 FL (ref 6–12)
POTASSIUM BLD-SCNC: 4.2 MMOL/L (ref 3.5–5.2)
PROTHROMBIN TIME: 12.6 SECONDS (ref 11.2–14.3)
RBC # BLD AUTO: 5.84 10*6/MM3 (ref 4.14–5.8)
SODIUM BLD-SCNC: 137 MMOL/L (ref 136–145)
WBC NRBC COR # BLD: 6.62 10*3/MM3 (ref 3.4–10.8)

## 2019-10-10 PROCEDURE — 93005 ELECTROCARDIOGRAM TRACING: CPT

## 2019-10-10 PROCEDURE — 93010 ELECTROCARDIOGRAM REPORT: CPT | Performed by: INTERNAL MEDICINE

## 2019-10-10 PROCEDURE — 36415 COLL VENOUS BLD VENIPUNCTURE: CPT

## 2019-10-10 PROCEDURE — 80048 BASIC METABOLIC PNL TOTAL CA: CPT | Performed by: ORTHOPAEDIC SURGERY

## 2019-10-10 PROCEDURE — 85730 THROMBOPLASTIN TIME PARTIAL: CPT | Performed by: ORTHOPAEDIC SURGERY

## 2019-10-10 PROCEDURE — G0480 DRUG TEST DEF 1-7 CLASSES: HCPCS | Performed by: ORTHOPAEDIC SURGERY

## 2019-10-10 PROCEDURE — 85610 PROTHROMBIN TIME: CPT | Performed by: ORTHOPAEDIC SURGERY

## 2019-10-10 PROCEDURE — 83036 HEMOGLOBIN GLYCOSYLATED A1C: CPT | Performed by: ORTHOPAEDIC SURGERY

## 2019-10-10 PROCEDURE — 85025 COMPLETE CBC W/AUTO DIFF WBC: CPT | Performed by: ORTHOPAEDIC SURGERY

## 2019-10-10 RX ORDER — TERBINAFINE HYDROCHLORIDE 250 MG/1
250 TABLET ORAL
COMMUNITY
End: 2022-09-30

## 2019-10-10 ASSESSMENT — KOOS JR
KOOS JR SCORE: 17
KOOS JR SCORE: 44.905

## 2019-10-11 LAB
BACTERIA UR QL AUTO: NORMAL /HPF
BILIRUB UR QL STRIP: NEGATIVE
CLARITY UR: CLEAR
COLOR UR: YELLOW
GLUCOSE UR STRIP-MCNC: NEGATIVE MG/DL
HGB UR QL STRIP.AUTO: NEGATIVE
HYALINE CASTS UR QL AUTO: NORMAL /LPF
KETONES UR QL STRIP: NEGATIVE
LEUKOCYTE ESTERASE UR QL STRIP.AUTO: NEGATIVE
NITRITE UR QL STRIP: NEGATIVE
PH UR STRIP.AUTO: <=5 [PH] (ref 5–8)
PROT UR QL STRIP: NEGATIVE
RBC # UR: NORMAL /HPF
REF LAB TEST METHOD: NORMAL
SP GR UR STRIP: 1.01 (ref 1–1.03)
SQUAMOUS #/AREA URNS HPF: NORMAL /HPF
UROBILINOGEN UR QL STRIP: NORMAL
WBC UR QL AUTO: NORMAL /HPF

## 2019-10-11 PROCEDURE — 81001 URINALYSIS AUTO W/SCOPE: CPT | Performed by: ORTHOPAEDIC SURGERY

## 2019-10-17 LAB
COTININE UR-MCNC: NORMAL NG/ML
NICOTINE SERPL-MCNC: NORMAL NG/ML

## 2019-10-18 ENCOUNTER — PATIENT MESSAGE (OUTPATIENT)
Dept: ORTHOPEDIC SURGERY | Facility: CLINIC | Age: 69
End: 2019-10-18

## 2019-10-18 ENCOUNTER — TELEPHONE (OUTPATIENT)
Dept: ORTHOPEDIC SURGERY | Facility: CLINIC | Age: 69
End: 2019-10-18

## 2019-10-18 NOTE — TELEPHONE ENCOUNTER
Materials are cobalt chrome and medical grade polyethylene plastic.  These materials are nearly universal across all total knee implants.

## 2019-10-18 NOTE — TELEPHONE ENCOUNTER
I sent this message back to the patient via [employer] and told him to let us know if he has any further questions.  Jeannine

## 2019-10-22 ENCOUNTER — ANESTHESIA EVENT (OUTPATIENT)
Dept: PERIOP | Facility: HOSPITAL | Age: 69
End: 2019-10-22

## 2019-10-23 ENCOUNTER — HOSPITAL ENCOUNTER (OUTPATIENT)
Facility: HOSPITAL | Age: 69
LOS: 1 days | Discharge: HOME | End: 2019-10-24
Attending: ORTHOPAEDIC SURGERY | Admitting: ORTHOPAEDIC SURGERY

## 2019-10-23 ENCOUNTER — APPOINTMENT (OUTPATIENT)
Dept: GENERAL RADIOLOGY | Facility: HOSPITAL | Age: 69
End: 2019-10-23

## 2019-10-23 ENCOUNTER — ANESTHESIA (OUTPATIENT)
Dept: PERIOP | Facility: HOSPITAL | Age: 69
End: 2019-10-23

## 2019-10-23 DIAGNOSIS — Z78.9 IMPAIRED MOBILITY AND ADLS: ICD-10-CM

## 2019-10-23 DIAGNOSIS — M17.11 PRIMARY OSTEOARTHRITIS OF RIGHT KNEE: ICD-10-CM

## 2019-10-23 DIAGNOSIS — Z96.651 STATUS POST TOTAL RIGHT KNEE REPLACEMENT: Primary | ICD-10-CM

## 2019-10-23 DIAGNOSIS — Z74.09 IMPAIRED MOBILITY AND ADLS: ICD-10-CM

## 2019-10-23 PROBLEM — I10 HTN (HYPERTENSION): Status: ACTIVE | Noted: 2019-10-23

## 2019-10-23 LAB — GLUCOSE BLDC GLUCOMTR-MCNC: 87 MG/DL (ref 70–130)

## 2019-10-23 PROCEDURE — 25010000002 KETOROLAC TROMETHAMINE PER 15 MG: Performed by: ORTHOPAEDIC SURGERY

## 2019-10-23 PROCEDURE — C1713 ANCHOR/SCREW BN/BN,TIS/BN: HCPCS | Performed by: ORTHOPAEDIC SURGERY

## 2019-10-23 PROCEDURE — 63710000001 POVIDONE-IODINE 10 % SOLUTION 30 ML BOTTLE: Performed by: ORTHOPAEDIC SURGERY

## 2019-10-23 PROCEDURE — A9270 NON-COVERED ITEM OR SERVICE: HCPCS | Performed by: NURSE PRACTITIONER

## 2019-10-23 PROCEDURE — 63710000001 LOSARTAN 50 MG TABLET: Performed by: NURSE PRACTITIONER

## 2019-10-23 PROCEDURE — 63710000001 AMLODIPINE 10 MG TABLET: Performed by: NURSE PRACTITIONER

## 2019-10-23 PROCEDURE — 25010000003 CEFAZOLIN IN DEXTROSE 2-4 GM/100ML-% SOLUTION: Performed by: ORTHOPAEDIC SURGERY

## 2019-10-23 PROCEDURE — 97116 GAIT TRAINING THERAPY: CPT

## 2019-10-23 PROCEDURE — C1776 JOINT DEVICE (IMPLANTABLE): HCPCS | Performed by: ORTHOPAEDIC SURGERY

## 2019-10-23 PROCEDURE — 25010000002 PROPOFOL 10 MG/ML EMULSION: Performed by: NURSE ANESTHETIST, CERTIFIED REGISTERED

## 2019-10-23 PROCEDURE — A9270 NON-COVERED ITEM OR SERVICE: HCPCS | Performed by: ORTHOPAEDIC SURGERY

## 2019-10-23 PROCEDURE — 27447 TOTAL KNEE ARTHROPLASTY: CPT | Performed by: ORTHOPAEDIC SURGERY

## 2019-10-23 PROCEDURE — 63710000001 SERTRALINE 50 MG TABLET: Performed by: NURSE PRACTITIONER

## 2019-10-23 PROCEDURE — 73560 X-RAY EXAM OF KNEE 1 OR 2: CPT

## 2019-10-23 PROCEDURE — 63710000001 ACETAMINOPHEN 500 MG TABLET: Performed by: ORTHOPAEDIC SURGERY

## 2019-10-23 PROCEDURE — 63710000001 ROPINIROLE 0.5 MG TABLET: Performed by: NURSE PRACTITIONER

## 2019-10-23 PROCEDURE — 25010000002 ROPIVACAINE PER 1 MG: Performed by: ORTHOPAEDIC SURGERY

## 2019-10-23 PROCEDURE — 97162 PT EVAL MOD COMPLEX 30 MIN: CPT

## 2019-10-23 PROCEDURE — 25010000002 DEXAMETHASONE PER 1 MG: Performed by: NURSE ANESTHETIST, CERTIFIED REGISTERED

## 2019-10-23 PROCEDURE — 25010000002 ONDANSETRON PER 1 MG: Performed by: NURSE ANESTHETIST, CERTIFIED REGISTERED

## 2019-10-23 PROCEDURE — 25010000002 ROPIVACAINE PER 1 MG: Performed by: NURSE ANESTHETIST, CERTIFIED REGISTERED

## 2019-10-23 PROCEDURE — 82962 GLUCOSE BLOOD TEST: CPT

## 2019-10-23 PROCEDURE — 25010000002 MORPHINE PER 10 MG: Performed by: ORTHOPAEDIC SURGERY

## 2019-10-23 PROCEDURE — 63710000001 SERTRALINE 25 MG TABLET: Performed by: NURSE PRACTITIONER

## 2019-10-23 DEVICE — TOTL KN HI DEMAND STRYKER: Type: IMPLANTABLE DEVICE | Site: KNEE | Status: FUNCTIONAL

## 2019-10-23 DEVICE — CMT BONE SIMPLEX/P FULL DOSE 10/PK: Type: IMPLANTABLE DEVICE | Site: KNEE | Status: FUNCTIONAL

## 2019-10-23 DEVICE — COMP FEM TRIATH CR NO4 RT: Type: IMPLANTABLE DEVICE | Site: KNEE | Status: FUNCTIONAL

## 2019-10-23 DEVICE — IMPLANTABLE DEVICE: Type: IMPLANTABLE DEVICE | Site: KNEE | Status: FUNCTIONAL

## 2019-10-23 DEVICE — INSRT TIB/KN TRIATHLON CONDY/STBL X3 SZ3 9MM: Type: IMPLANTABLE DEVICE | Site: KNEE | Status: FUNCTIONAL

## 2019-10-23 DEVICE — BASEPLT TIB TRIATH CMT NO3: Type: IMPLANTABLE DEVICE | Site: KNEE | Status: FUNCTIONAL

## 2019-10-23 RX ORDER — ONDANSETRON 2 MG/ML
4 INJECTION INTRAMUSCULAR; INTRAVENOUS EVERY 6 HOURS PRN
Status: DISCONTINUED | OUTPATIENT
Start: 2019-10-23 | End: 2019-10-24 | Stop reason: HOSPADM

## 2019-10-23 RX ORDER — BISACODYL 10 MG
10 SUPPOSITORY, RECTAL RECTAL DAILY PRN
Status: DISCONTINUED | OUTPATIENT
Start: 2019-10-23 | End: 2019-10-24 | Stop reason: HOSPADM

## 2019-10-23 RX ORDER — SODIUM CHLORIDE, SODIUM LACTATE, POTASSIUM CHLORIDE, CALCIUM CHLORIDE 600; 310; 30; 20 MG/100ML; MG/100ML; MG/100ML; MG/100ML
9 INJECTION, SOLUTION INTRAVENOUS CONTINUOUS
Status: DISCONTINUED | OUTPATIENT
Start: 2019-10-23 | End: 2019-10-24 | Stop reason: HOSPADM

## 2019-10-23 RX ORDER — LABETALOL HYDROCHLORIDE 5 MG/ML
10 INJECTION, SOLUTION INTRAVENOUS EVERY 4 HOURS PRN
Status: DISCONTINUED | OUTPATIENT
Start: 2019-10-23 | End: 2019-10-24 | Stop reason: HOSPADM

## 2019-10-23 RX ORDER — ACETAMINOPHEN 500 MG
1000 TABLET ORAL ONCE
Status: COMPLETED | OUTPATIENT
Start: 2019-10-23 | End: 2019-10-23

## 2019-10-23 RX ORDER — DOCUSATE SODIUM 100 MG/1
100 CAPSULE, LIQUID FILLED ORAL 2 TIMES DAILY PRN
Status: DISCONTINUED | OUTPATIENT
Start: 2019-10-23 | End: 2019-10-24 | Stop reason: HOSPADM

## 2019-10-23 RX ORDER — SODIUM CHLORIDE 0.9 % (FLUSH) 0.9 %
3-10 SYRINGE (ML) INJECTION AS NEEDED
Status: DISCONTINUED | OUTPATIENT
Start: 2019-10-23 | End: 2019-10-24 | Stop reason: HOSPADM

## 2019-10-23 RX ORDER — BISACODYL 5 MG/1
10 TABLET, DELAYED RELEASE ORAL DAILY PRN
Status: DISCONTINUED | OUTPATIENT
Start: 2019-10-23 | End: 2019-10-24 | Stop reason: HOSPADM

## 2019-10-23 RX ORDER — OXYCODONE HYDROCHLORIDE 5 MG/1
10 TABLET ORAL EVERY 4 HOURS PRN
Status: DISCONTINUED | OUTPATIENT
Start: 2019-10-23 | End: 2019-10-24 | Stop reason: HOSPADM

## 2019-10-23 RX ORDER — HYDROMORPHONE HYDROCHLORIDE 1 MG/ML
0.5 INJECTION, SOLUTION INTRAMUSCULAR; INTRAVENOUS; SUBCUTANEOUS
Status: DISCONTINUED | OUTPATIENT
Start: 2019-10-23 | End: 2019-10-23 | Stop reason: HOSPADM

## 2019-10-23 RX ORDER — ONDANSETRON 2 MG/ML
INJECTION INTRAMUSCULAR; INTRAVENOUS AS NEEDED
Status: DISCONTINUED | OUTPATIENT
Start: 2019-10-23 | End: 2019-10-23 | Stop reason: SURG

## 2019-10-23 RX ORDER — ACETAMINOPHEN 500 MG
1000 TABLET ORAL EVERY 6 HOURS
Status: DISCONTINUED | OUTPATIENT
Start: 2019-10-23 | End: 2019-10-24 | Stop reason: HOSPADM

## 2019-10-23 RX ORDER — MAGNESIUM HYDROXIDE 1200 MG/15ML
LIQUID ORAL AS NEEDED
Status: DISCONTINUED | OUTPATIENT
Start: 2019-10-23 | End: 2019-10-23 | Stop reason: HOSPADM

## 2019-10-23 RX ORDER — LIDOCAINE HYDROCHLORIDE 10 MG/ML
0.5 INJECTION, SOLUTION EPIDURAL; INFILTRATION; INTRACAUDAL; PERINEURAL ONCE AS NEEDED
Status: COMPLETED | OUTPATIENT
Start: 2019-10-23 | End: 2019-10-23

## 2019-10-23 RX ORDER — ONDANSETRON 4 MG/1
4 TABLET, FILM COATED ORAL EVERY 6 HOURS PRN
Status: DISCONTINUED | OUTPATIENT
Start: 2019-10-23 | End: 2019-10-24 | Stop reason: HOSPADM

## 2019-10-23 RX ORDER — ALPRAZOLAM 0.25 MG/1
0.25 TABLET ORAL 2 TIMES DAILY PRN
Status: DISCONTINUED | OUTPATIENT
Start: 2019-10-23 | End: 2019-10-24 | Stop reason: HOSPADM

## 2019-10-23 RX ORDER — OXYCODONE HYDROCHLORIDE 5 MG/1
5 TABLET ORAL EVERY 4 HOURS PRN
Status: DISCONTINUED | OUTPATIENT
Start: 2019-10-23 | End: 2019-10-24 | Stop reason: HOSPADM

## 2019-10-23 RX ORDER — SODIUM CHLORIDE 0.9 % (FLUSH) 0.9 %
3 SYRINGE (ML) INJECTION EVERY 12 HOURS SCHEDULED
Status: DISCONTINUED | OUTPATIENT
Start: 2019-10-23 | End: 2019-10-23 | Stop reason: HOSPADM

## 2019-10-23 RX ORDER — ROPINIROLE 0.5 MG/1
0.5 TABLET, FILM COATED ORAL 2 TIMES DAILY
Status: DISCONTINUED | OUTPATIENT
Start: 2019-10-23 | End: 2019-10-24 | Stop reason: HOSPADM

## 2019-10-23 RX ORDER — CEFAZOLIN SODIUM 2 G/100ML
2 INJECTION, SOLUTION INTRAVENOUS EVERY 8 HOURS
Status: COMPLETED | OUTPATIENT
Start: 2019-10-23 | End: 2019-10-24

## 2019-10-23 RX ORDER — SODIUM CHLORIDE 0.9 % (FLUSH) 0.9 %
3 SYRINGE (ML) INJECTION EVERY 12 HOURS SCHEDULED
Status: DISCONTINUED | OUTPATIENT
Start: 2019-10-23 | End: 2019-10-24 | Stop reason: HOSPADM

## 2019-10-23 RX ORDER — MELOXICAM 7.5 MG/1
15 TABLET ORAL DAILY
Status: DISCONTINUED | OUTPATIENT
Start: 2019-10-24 | End: 2019-10-24 | Stop reason: HOSPADM

## 2019-10-23 RX ORDER — HYDROMORPHONE HYDROCHLORIDE 1 MG/ML
0.5 INJECTION, SOLUTION INTRAMUSCULAR; INTRAVENOUS; SUBCUTANEOUS
Status: DISCONTINUED | OUTPATIENT
Start: 2019-10-23 | End: 2019-10-24 | Stop reason: HOSPADM

## 2019-10-23 RX ORDER — FENTANYL CITRATE 50 UG/ML
50 INJECTION, SOLUTION INTRAMUSCULAR; INTRAVENOUS
Status: DISCONTINUED | OUTPATIENT
Start: 2019-10-23 | End: 2019-10-23 | Stop reason: HOSPADM

## 2019-10-23 RX ORDER — MELOXICAM 15 MG/1
15 TABLET ORAL ONCE
Status: COMPLETED | OUTPATIENT
Start: 2019-10-23 | End: 2019-10-23

## 2019-10-23 RX ORDER — BUPIVACAINE HYDROCHLORIDE 5 MG/ML
INJECTION, SOLUTION PERINEURAL
Status: COMPLETED | OUTPATIENT
Start: 2019-10-23 | End: 2019-10-23

## 2019-10-23 RX ORDER — PROMETHAZINE HYDROCHLORIDE 25 MG/ML
6.25 INJECTION, SOLUTION INTRAMUSCULAR; INTRAVENOUS ONCE AS NEEDED
Status: DISCONTINUED | OUTPATIENT
Start: 2019-10-23 | End: 2019-10-23 | Stop reason: HOSPADM

## 2019-10-23 RX ORDER — AMLODIPINE BESYLATE 10 MG/1
10 TABLET ORAL DAILY
Status: DISCONTINUED | OUTPATIENT
Start: 2019-10-23 | End: 2019-10-24 | Stop reason: HOSPADM

## 2019-10-23 RX ORDER — ASPIRIN 325 MG
325 TABLET, DELAYED RELEASE (ENTERIC COATED) ORAL EVERY 12 HOURS SCHEDULED
Status: DISCONTINUED | OUTPATIENT
Start: 2019-10-24 | End: 2019-10-24 | Stop reason: HOSPADM

## 2019-10-23 RX ORDER — DEXAMETHASONE SODIUM PHOSPHATE 4 MG/ML
INJECTION, SOLUTION INTRA-ARTICULAR; INTRALESIONAL; INTRAMUSCULAR; INTRAVENOUS; SOFT TISSUE AS NEEDED
Status: DISCONTINUED | OUTPATIENT
Start: 2019-10-23 | End: 2019-10-23 | Stop reason: SURG

## 2019-10-23 RX ORDER — PROMETHAZINE HYDROCHLORIDE 25 MG/1
25 SUPPOSITORY RECTAL ONCE AS NEEDED
Status: DISCONTINUED | OUTPATIENT
Start: 2019-10-23 | End: 2019-10-23 | Stop reason: HOSPADM

## 2019-10-23 RX ORDER — LOSARTAN POTASSIUM 50 MG/1
100 TABLET ORAL
Status: DISCONTINUED | OUTPATIENT
Start: 2019-10-23 | End: 2019-10-24 | Stop reason: HOSPADM

## 2019-10-23 RX ORDER — NALOXONE HCL 0.4 MG/ML
0.1 VIAL (ML) INJECTION
Status: DISCONTINUED | OUTPATIENT
Start: 2019-10-23 | End: 2019-10-24 | Stop reason: HOSPADM

## 2019-10-23 RX ORDER — SODIUM CHLORIDE 0.9 % (FLUSH) 0.9 %
3-10 SYRINGE (ML) INJECTION AS NEEDED
Status: DISCONTINUED | OUTPATIENT
Start: 2019-10-23 | End: 2019-10-23 | Stop reason: HOSPADM

## 2019-10-23 RX ORDER — FAMOTIDINE 10 MG/ML
20 INJECTION, SOLUTION INTRAVENOUS ONCE
Status: DISCONTINUED | OUTPATIENT
Start: 2019-10-23 | End: 2019-10-23 | Stop reason: HOSPADM

## 2019-10-23 RX ORDER — OXYCODONE HCL 10 MG/1
10 TABLET, FILM COATED, EXTENDED RELEASE ORAL ONCE
Status: COMPLETED | OUTPATIENT
Start: 2019-10-23 | End: 2019-10-23

## 2019-10-23 RX ORDER — FAMOTIDINE 20 MG/1
20 TABLET, FILM COATED ORAL ONCE
Status: COMPLETED | OUTPATIENT
Start: 2019-10-23 | End: 2019-10-23

## 2019-10-23 RX ORDER — PROMETHAZINE HYDROCHLORIDE 25 MG/1
25 TABLET ORAL ONCE AS NEEDED
Status: DISCONTINUED | OUTPATIENT
Start: 2019-10-23 | End: 2019-10-23 | Stop reason: HOSPADM

## 2019-10-23 RX ORDER — CEFAZOLIN SODIUM 2 G/100ML
2 INJECTION, SOLUTION INTRAVENOUS ONCE
Status: COMPLETED | OUTPATIENT
Start: 2019-10-23 | End: 2019-10-23

## 2019-10-23 RX ORDER — BUPIVACAINE HYDROCHLORIDE 2.5 MG/ML
INJECTION, SOLUTION EPIDURAL; INFILTRATION; INTRACAUDAL
Status: COMPLETED | OUTPATIENT
Start: 2019-10-23 | End: 2019-10-23

## 2019-10-23 RX ORDER — SODIUM CHLORIDE 9 MG/ML
100 INJECTION, SOLUTION INTRAVENOUS CONTINUOUS
Status: DISCONTINUED | OUTPATIENT
Start: 2019-10-23 | End: 2019-10-24 | Stop reason: HOSPADM

## 2019-10-23 RX ADMIN — LIDOCAINE HYDROCHLORIDE 0.5 ML: 10 INJECTION, SOLUTION EPIDURAL; INFILTRATION; INTRACAUDAL; PERINEURAL at 08:36

## 2019-10-23 RX ADMIN — SODIUM CHLORIDE 100 ML/HR: 9 INJECTION, SOLUTION INTRAVENOUS at 17:59

## 2019-10-23 RX ADMIN — MUPIROCIN 1 APPLICATION: 20 OINTMENT TOPICAL at 08:36

## 2019-10-23 RX ADMIN — TRANEXAMIC ACID 1000 MG: 100 INJECTION, SOLUTION INTRAVENOUS at 11:26

## 2019-10-23 RX ADMIN — TRANEXAMIC ACID 1000 MG: 100 INJECTION, SOLUTION INTRAVENOUS at 10:14

## 2019-10-23 RX ADMIN — CEFAZOLIN SODIUM 2 G: 2 INJECTION, SOLUTION INTRAVENOUS at 10:04

## 2019-10-23 RX ADMIN — SODIUM CHLORIDE 100 ML/HR: 9 INJECTION, SOLUTION INTRAVENOUS at 12:37

## 2019-10-23 RX ADMIN — SODIUM CHLORIDE, POTASSIUM CHLORIDE, SODIUM LACTATE AND CALCIUM CHLORIDE 9 ML/HR: 600; 310; 30; 20 INJECTION, SOLUTION INTRAVENOUS at 08:36

## 2019-10-23 RX ADMIN — ACETAMINOPHEN 1000 MG: 500 TABLET ORAL at 08:36

## 2019-10-23 RX ADMIN — BUPIVACAINE HYDROCHLORIDE 20 ML: 2.5 INJECTION, SOLUTION EPIDURAL; INFILTRATION; INTRACAUDAL; PERINEURAL at 12:19

## 2019-10-23 RX ADMIN — ONDANSETRON 4 MG: 2 INJECTION INTRAMUSCULAR; INTRAVENOUS at 11:30

## 2019-10-23 RX ADMIN — ACETAMINOPHEN 1000 MG: 500 TABLET, FILM COATED ORAL at 17:59

## 2019-10-23 RX ADMIN — CEFAZOLIN SODIUM 2 G: 2 INJECTION, SOLUTION INTRAVENOUS at 18:01

## 2019-10-23 RX ADMIN — SODIUM CHLORIDE 100 ML/HR: 9 INJECTION, SOLUTION INTRAVENOUS at 22:45

## 2019-10-23 RX ADMIN — ROPINIROLE 0.5 MG: 0.5 TABLET, FILM COATED ORAL at 21:43

## 2019-10-23 RX ADMIN — ROPIVACAINE HYDROCHLORIDE 10 ML/HR: 5 INJECTION, SOLUTION EPIDURAL; INFILTRATION; PERINEURAL at 12:29

## 2019-10-23 RX ADMIN — OXYCODONE HYDROCHLORIDE 10 MG: 10 TABLET, FILM COATED, EXTENDED RELEASE ORAL at 08:36

## 2019-10-23 RX ADMIN — SERTRALINE HYDROCHLORIDE 75 MG: 25 TABLET ORAL at 17:58

## 2019-10-23 RX ADMIN — AMLODIPINE BESYLATE 10 MG: 10 TABLET ORAL at 17:59

## 2019-10-23 RX ADMIN — DEXAMETHASONE SODIUM PHOSPHATE 8 MG: 4 INJECTION, SOLUTION INTRAMUSCULAR; INTRAVENOUS at 10:20

## 2019-10-23 RX ADMIN — MELOXICAM 15 MG: 15 TABLET ORAL at 08:36

## 2019-10-23 RX ADMIN — BUPIVACAINE HYDROCHLORIDE 2 ML: 5 INJECTION, SOLUTION PERINEURAL at 10:10

## 2019-10-23 RX ADMIN — PROPOFOL 55 MCG/KG/MIN: 10 INJECTION, EMULSION INTRAVENOUS at 10:14

## 2019-10-23 RX ADMIN — LOSARTAN POTASSIUM 100 MG: 50 TABLET ORAL at 17:59

## 2019-10-23 RX ADMIN — FAMOTIDINE 20 MG: 20 TABLET, FILM COATED ORAL at 08:35

## 2019-10-23 NOTE — ANESTHESIA PROCEDURE NOTES
Spinal Block      Patient reassessed immediately prior to procedure    Patient location during procedure: OR  Indication:at surgeon's request  Performed By  CRNA: Merissa Camacho CRNA  Preanesthetic Checklist  Completed: patient identified, site marked, surgical consent, pre-op evaluation, timeout performed, IV checked, risks and benefits discussed and monitors and equipment checked  Spinal Block Prep:  Patient Position:sitting  Sterile Tech:cap, gloves, sterile barriers and mask  Prep:Chloraprep  Patient Monitoring:blood pressure monitoring, continuous pulse oximetry and EKG  Spinal Block Procedure  Approach:midline  Guidance:landmark technique and palpation technique  Location:L4-L5  Needle Type:Sprotte  Needle Gauge:25 G  Placement of Spinal needle event:cerebrospinal fluid aspirated  Paresthesia: no  Fluid Appearance:clear  Medications: bupivacaine (MARCAINE) 0.5 % injection, 2 mL  Med Administered at 10/23/2019 10:10 AM   Post Assessment  Patient Tolerance:patient tolerated the procedure well with no apparent complications  Complications no  Additional Notes  Procedure:  Pt assisted to sitting position, with legs in position of comfort over side of bed.  Pt. instructed in optimal spine presentation, the spine was prepped/ Draped and the skin at insertion site was anesthetized with 1% Lidocaine 2 ml.  The spinal needle was then advanced until CSF flow was obtained and LA was injected:

## 2019-10-23 NOTE — BRIEF OP NOTE
TOTAL KNEE ARTHROPLASTY  Progress Note    Harvey Levine  10/23/2019    Pre-op Diagnosis:   Primary osteoarthritis of right knee [M17.11]       Post-Op Diagnosis Codes:     * Primary osteoarthritis of right knee [M17.11]    Procedure/CPT® Codes:  CT TOTAL KNEE ARTHROPLASTY [92744]    Procedure(s):  TOTAL KNEE ARTHROPLASTY RIGHT    Surgeon(s):  Luis Harkins MD    Anesthesia: Spinal    Staff:   Circulator: Justin Gee RN; Ana Luisa Martin RN; Benita Mendoza RN  Scrub Person: Leah Shelton; Gary Hedrick  Vendor Representative: Jairo Peraza  Nursing Assistant: Carly Courtney  Assistant: Sara Ruff PA    Estimated Blood Loss: 50ml    Urine Voided: * No values recorded between 10/23/2019 10:04 AM and 10/23/2019 11:54 AM *    Specimens:                None          Drains:      Findings: Advanced tricompartmental osteoarthritis with varus alignment    Complications: None apparent      Luis Harkins MD     Date: 10/23/2019  Time: 11:54 AM

## 2019-10-23 NOTE — THERAPY EVALUATION
Patient Name: Harvey Levine  : 1950    MRN: [id number]                              Today's Date: 10/23/2019       Admit Date: 10/23/2019    Visit Dx:     ICD-10-CM ICD-9-CM   1. Status post total right knee replacement Z96.651 V43.65   2. Primary osteoarthritis of right knee M17.11 715.16     Patient Active Problem List   Diagnosis   • Erythrocytosis   • GERD   • Moderate obstructive sleep apnea   • Sleepiness   • Restless legs syndrome (RLS)   • Status post total right knee replacement   • HTN (hypertension)     Past Medical History:   Diagnosis Date   • Anxiety    • Arthritis    • Hearing loss     WEAR AIDS   • Hyperlipidemia    • Hypertension    • Liver failure, acute     RELATED TO STATIN USE; RESOLVED.   • MVP (mitral valve prolapse)    • Psoriasis    • RLS (restless legs syndrome)    • Sleep apnea     WILL BRING MASK, TUBING, AND SETTINGS   • Wears glasses      Past Surgical History:   Procedure Laterality Date   • COLONOSCOPY  SPRING 2019   • ENDOSCOPY  2017    Dr. Medrano   • HERNIA REPAIR     • LIPOMA EXCISION Right     SHOULDER   • LIVER BIOPSY  2012   • MOUTH BIOPSY  2011   • NEUROMA SURGERY Right     KNEE   • SKIN BIOPSY      Dr. Héctor Bernal   • VASECTOMY  1992    [facility]     General Information     Row Name 10/23/19 1731          PT Evaluation Time/Intention    Document Type  evaluation  -EJ     Mode of Treatment  physical therapy  -EJ     Row Name 10/23/19 173          General Information    Patient Profile Reviewed?  yes  -EJ     Prior Level of Function  min assist:;gait Weight bearing on stairs or during ambulation was increasingly painful for pt.  -EJ     Existing Precautions/Restrictions  fall;weight bearing;other (see comments) WBAT Adductor canal neve cath RLE  -EJ     Barriers to Rehab  none identified  -EJ     Row Name 10/23/19 173          Relationship/Environment    Lives With  spouse  -EJ     Row Name 10/23/19 1731          Resource/Environmental  Concerns    Current Living Arrangements  home/apartment/condo  -     Row Name 10/23/19 1731          Home Main Entrance    Number of Stairs, Main Entrance  other (see comments)  (Significant)  3 + turning corner +1 tall, or 5 steps without rails with steep slope  -EJ     Stair Railings, Main Entrance  none  (Significant)   -     Row Name 10/23/19 1731          Cognitive Assessment/Intervention- PT/OT    Orientation Status (Cognition)  oriented x 4  -     Row Name 10/23/19 1731          Safety Issues, Functional Mobility    Safety Issues Affecting Function (Mobility)  safety precaution awareness;safety precautions follow-through/compliance;awareness of need for assistance  -EJ     Impairments Affecting Function (Mobility)  balance;motor control;range of motion (ROM);pain;strength  -EJ       User Key  (r) = Recorded By, (t) = Taken By, (c) = Cosigned By    Initials Name Provider Type     Mckenzie Riddle PT Physical Therapist        Mobility     Row Name 10/23/19 1735          Bed Mobility Assessment/Treatment    Bed Mobility Assessment/Treatment  supine-sit  -EJ     Supine-Sit [place] (Bed Mobility)  supervision  -     Assistive Device (Bed Mobility)  head of bed elevated;bed rails  -     Row Name 10/23/19 1735          Transfer Assessment/Treatment    Comment (Transfers)  VC for hand placement when standing and sitting.  -     Row Name 10/23/19 1735          Sit-Stand Transfer    Sit-Stand [place] (Transfers)  contact guard  -EJ     Assistive Device (Sit-Stand Transfers)  walker, front-wheeled  -EJ     Row Name 10/23/19 1735          Gait/Stairs Assessment/Training    [place] Level (Gait)  contact guard  -EJ     Assistive Device (Gait)  walker, front-wheeled  -     Distance in Feet (Gait)  350  -EJ     Pattern (Gait)  step-through  -EJ     Deviations/Abnormal Patterns (Gait)  antalgic  -EJ     Left Sided Gait Deviations  -- step length decreased  -EJ     Right Sided Gait Deviations   heel strike decreased;weight shift ability decreased  -EJ     Comment (Gait/Stairs)  Pt cued for increased WB RLE, increased step length LLE. Cues for positioning of RWx.  -     Row Name 10/23/19 1735          Mobility Assessment/Intervention    Extremity Weight-bearing Status  right lower extremity  -EJ     Right Lower Extremity (Weight-bearing Status)  weight-bearing as tolerated (WBAT)  -EJ       User Key  (r) = Recorded By, (t) = Taken By, (c) = Cosigned By    Initials Name Provider Type     Mckenzie Riddle PT Physical Therapist        Obj/Interventions     Row Name 10/23/19 1738          General ROM    GENERAL ROM COMMENTS  RLE Knee ROM lacking ~25%  -[facility] Name 10/23/19 1738          MMT (Manual Muscle Testing)    General MMT Comments  DF, GTE 5/5. SLR intact  -[facility] Name 10/23/19 1738          Therapeutic Exercise    Lower Extremity (Therapeutic Exercise)  gluteal sets;quad sets, bilateral  -EJ     Lower Extremity Range of Motion (Therapeutic Exercise)  ankle dorsiflexion/plantar flexion, bilateral  -EJ     Exercise Type (Therapeutic Exercise)  AROM (active range of motion);isometric contraction, static  -EJ     Sets/Reps (Therapeutic Exercise)  1/10  -     Row Name 10/23/19 1738          Sensory Assessment/Intervention    Sensory General Assessment  -- BLE feet intact; Adductor canal related numbness at knee  -EJ       User Key  (r) = Recorded By, (t) = Taken By, (c) = Cosigned By    Initials Name Provider Type     Mckenzie Riddle PT Physical Therapist        Goals/Plan     Row Name 10/23/19 1741          Bed Mobility Goal 1 (PT)    Activity/Assistive Device (Bed Mobility Goal 1, PT)  bed mobility activities, all  -EJ     [place] Level/Cues Needed (Bed Mobility Goal 1, PT)  independent  -EJ     Time Frame (Bed Mobility Goal 1, PT)  long term goal (LTG);3 days  -     Row Name 10/23/19 1741          Transfer Goal 1 (PT)    Activity/Assistive Device (Transfer Goal 1, PT)   sit-to-stand/stand-to-sit;walker, rolling  -EJ     [place] Level/Cues Needed (Transfer Goal 1, PT)  conditional independence  -EJ     Time Frame (Transfer Goal 1, PT)  3 days  -EJ     Row Name 10/23/19 1741          Gait Training Goal 1 (PT)    Activity/Assistive Device (Gait Training Goal 1, PT)  gait (walking locomotion);walker, rolling;assistive device use  -EJ     [place] Level (Gait Training Goal 1, PT)  conditional independence  -EJ     Distance (Gait Goal 1, PT)  500  -EJ     Time Frame (Gait Training Goal 1, PT)  3 days  -EJ     Row Name 10/23/19 1741          ROM Goal 1 (PT)    ROM Goal 1 (PT)  0-90 RLE knee ROM  -EJ     Time Frame (ROM Goal 1, PT)  long term goal (LTG);3 days  -EJ     Row Name 10/23/19 1741          Stairs Goal 1 (PT)    Activity/Assistive Device (Stairs Goal 1, PT)  stairs, all skills;cane, straight HHA  -EJ     [place] Level/Cues Needed (Stairs Goal 1, PT)  minimum assist (75% or more patient effort)  -EJ     Number of Stairs (Stairs Goal 1, PT)  5  -EJ     Time Frame (Stairs Goal 1, PT)  long term goal (LTG);3 days  -EJ       User Key  (r) = Recorded By, (t) = Taken By, (c) = Cosigned By    Initials Name Provider Type    EJ Mckenzie Riddle, PT Physical Therapist        Clinical Impression     Row Name 10/23/19 1739          Pain Assessment    Additional Documentation  Pain Scale: Numbers Pre/Post-Treatment (Group)  -[facility] Name 10/23/19 1739          Pain Scale: Numbers Pre/Post-Treatment    Pain Scale: Numbers, Pretreatment  0/10 - no pain  -EJ     Pain Scale: Numbers, Post-Treatment  2/10  -EJ     Pain Location - Side  Right  -EJ     Pain Location - Orientation  incisional  -EJ     Pain Location  knee  -EJ     Pain Intervention(s)  Repositioned;Ambulation/increased activity  -     Row Name 10/23/19 1739          Plan of Care Review    Plan of Care Reviewed With  patient  -[facility] Name 10/23/19 1739          Physical Therapy Clinical Impression    Criteria  for Skilled Interventions Met (PT Clinical Impression)  yes;treatment indicated  -EJ     Rehab Potential (PT Clinical Summary)  good, to achieve stated therapy goals  -EJ     Row Name 10/23/19 1737          Positioning and Restraints    Pre-Treatment Position  in bed  -EJ     Post Treatment Position  chair  -EJ     In Chair  call light within reach;encouraged to call for assist;reclined;exit alarm on;with family/caregiver ice pack, SCD pumps, pillow proping into knee extension and neutral rotation of hip  -EJ       User Key  (r) = Recorded By, (t) = Taken By, (c) = Cosigned By    Initials Name Provider Type    Mckenzie Gutiérrez PT Physical Therapist        Outcome Measures     Row Name 10/23/19 1745          How much help from another person do you currently need...    Turning from your back to your side while in flat bed without using bedrails?  4  -EJ     Moving from lying on back to sitting on the side of a flat bed without bedrails?  3  -EJ     Moving to and from a bed to a chair (including a wheelchair)?  3  -EJ     Standing up from a chair using your arms (e.g., wheelchair, bedside chair)?  3  -EJ     Climbing 3-5 steps with a railing?  2  -EJ     To walk in hospital room?  3  -EJ     AM-PAC 6 Clicks Score (PT)  18  -EJ     Row Name 10/23/19 7996          Functional Assessment    Outcome Measure Options  AM-PAC 6 Clicks Basic Mobility (PT)  -EJ       User Key  (r) = Recorded By, (t) = Taken By, (c) = Cosigned By    Initials Name Provider Type    Mckenzie Gutiérrez PT Physical Therapist        Physical Therapy Education     Title: PT OT SLP Therapies (In Progress)     Topic: Physical Therapy (In Progress)     Point: Mobility training (Done)     Learning Progress Summary           Patient Acceptance, E, VU,NR by GEMMA at 10/23/2019  5:43 PM   Significant Other Acceptance, E, VU,NR by GEMMA at 10/23/2019  5:43 PM                   Point: Body mechanics (Done)     Learning Progress Summary           Patient  Acceptance, E, VU,NR by  at 10/23/2019  5:43 PM   Significant Other Acceptance, E, VU,NR by  at 10/23/2019  5:43 PM                   Point: Precautions (Done)     Learning Progress Summary           Patient Acceptance, E, VU,NR by  at 10/23/2019  5:43 PM   Significant Other Acceptance, E, VU,NR by  at 10/23/2019  5:43 PM                               User Key     Initials Effective Dates Name Provider Type [facility] 11/20/18 -  Mckenzie Riddle PT Physical Therapist PT              PT Recommendation and Plan  Planned Therapy Interventions (PT Eval): balance training, bed mobility training, gait training, home exercise program, joint mobilization, motor coordination training, ROM (range of motion), stair training, strengthening, stretching, postural re-education, patient/family education, transfer training  Outcome Summary/Treatment Plan (PT)  Anticipated Discharge Disposition (PT): home with assist, home with home health  Plan of Care Reviewed With: patient  Progress: improving  Outcome Summary: Pt ambulates in [place] with CGA using RWx x 350 ft. Pt ROM measurement to start tomorrow. PADD score of 9. Pt expected to d/c home with assist and HHPT.      Time Calculation:   PT Charges     Row Name 10/23/19 1746             Time Calculation    Start Time  1659  -      PT Received On  10/23/19  -      PT Goal Re-Cert Due Date  11/02/19  -         Time Calculation- PT    Total Timed Code Minutes- PT  15 minute(s)  -         Timed Charges    83721 - PT Therapeutic Exercise Minutes  5  -EJ      86682 - Gait Training Minutes   10  -EJ        User Key  (r) = Recorded By, (t) = Taken By, (c) = Cosigned By    Initials Name Provider Type     Mckenzie Riddle PT Physical Therapist        Therapy Charges for Today     Code Description Service Date Service Provider Modifiers Qty    76556089478 HC GAIT TRAINING EA 15 MIN 10/23/2019 Mckenzie Riddle, PT GP 1    99348162315 HC PT EVAL MOD COMPLEXITY 3  10/23/2019 Mckenzie Riddle, PT GP 1    05510904567 HC PT THER SUPP EA 15 MIN 10/23/2019 Mckenzie Riddle, PT GP 3          PT G-Codes  Outcome Measure Options: AM-PAC 6 Clicks Basic Mobility (PT)  AM-PAC 6 Clicks Score (PT): 18    Mckenzie Do, PT  10/23/2019

## 2019-10-23 NOTE — H&P
Pre-Op H&P  Harvey Levine  [id number]  1950    Chief complaint: Right knee pain    HPI:    Patient is a 69 y.o.male who presents with bilateral knee osteoarthritis. He received cortisone injections to both knees 4 months ago. The left knee injection provided some good relief.  The right knee injection only provided a few weeks of relief before his pain returned.  He continues to have pain localized anterior medial aspect of the right knee.  He rates his pain a 4/10 on the pain scale.  Walking weightbearing increases pain.  He is having constant dull aching sensation that is especially worse at night.  Despite anti-inflammatory treatment, injections, bracing, and exercise modification, he is still having pain and limitations in daily activities. Surgical intervention is recommended and he is agreeable. He is here today for a right total knee arthroplasty.    Review of Systems:  General ROS: negative for chills, fever or skin lesions;  No changes since last office visit  Cardiovascular ROS: no chest pain or dyspnea on exertion; +HTN, +MVP, +dyslipidemia  Respiratory ROS: no cough, shortness of breath, or wheezing; +HUGH (uses CPAP)      Allergies:   Allergies   Allergen Reactions   • Crestor [Rosuvastatin Calcium] Other (See Comments) and Myalgia     Liver failure   • Lipitor [Atorvastatin] Other (See Comments) and Myalgia     Liver failure   • Lisinopril Other (See Comments)     Liver failure   • Zetia [Ezetimibe] Other (See Comments)     Liver failure   • Adhesive Tape Other (See Comments)     Tearing of the skin       Home Meds:    No current facility-administered medications on file prior to encounter.      Current Outpatient Medications on File Prior to Encounter   Medication Sig Dispense Refill   • ALPRAZolam (XANAX) 0.25 MG tablet Take 0.25 mg by mouth 2 (Two) Times a Day As Needed (RESTLESS LEG).     • amLODIPine (NORVASC) 10 MG tablet Take 10 mg by mouth Daily.     • clobetasol (TEMOVATE) 0.05 %  gel Apply 1 application topically to the appropriate area as directed Daily. ON GUMS     • ketoconazole (NIZORAL) 2 % cream Apply 1 application topically to the appropriate area as directed Every Night.     • rOPINIRole (REQUIP) 0.5 MG tablet Take 1 hour before bedtime. And 1 at lunch (Patient taking differently: Take 0.5 mg by mouth 2 (Two) Times a Day. Take 1 hour before bedtime. And 1 at lunch) 180 tablet 3   • sertraline (ZOLOFT) 50 MG tablet Take 75 mg by mouth Daily.     • telmisartan (MICARDIS) 80 MG tablet Take 80 mg by mouth Daily.     • tiZANidine (ZANAFLEX) 4 MG tablet Take 4 mg by mouth Every 8 (Eight) Hours As Needed.     • amLODIPine (NORVASC) 2.5 MG tablet          PMH:   Past Medical History:   Diagnosis Date   • Anxiety    • Arthritis    • Hearing loss     WEAR AIDS   • Hyperlipidemia    • Hypertension    • Liver failure, acute     RELATED TO STATIN USE; RESOLVED.   • MVP (mitral valve prolapse)    • Psoriasis    • RLS (restless legs syndrome)    • Sleep apnea     WILL BRING MASK, TUBING, AND SETTINGS   • Wears glasses      PSH:    Past Surgical History:   Procedure Laterality Date   • COLONOSCOPY  SPR[place] 2019   • ENDOSCOPY  09/29/2017    Dr. Medrano   • HERNIA REPAIR     • LIPOMA EXCISION Right     SHOULDER   • LIVER BIOPSY  02/20/2012   • MOUTH BIOPSY  05/25/2011   • NEUROMA SURGERY Right     KNEE   • SKIN BIOPSY  2018    Dr. Héctor Bernal   • VASECTOMY  01/01/1992     [facility]         Social History:   Tobacco:   Social History     Tobacco Use   Smoking Status Never Smoker   Smokeless Tobacco Never Used      Alcohol:     Social History     Substance and Sexual Activity   Alcohol Use Yes   • Alcohol/week: 8.4 oz   • Types: 14 Glasses of wine per week       Vitals:           /84 (BP Location: Right arm, Patient Position: Lying)   Pulse 52   Temp 96.7 °F (35.9 °C) (Temporal)   Resp 18   SpO2 97%     Physical Exam:  General Appearance:    Alert, cooperative, no distress, appears stated age    Head:    Normocephalic, without obvious abnormality, atraumatic   Lungs:     Clear to auscultation bilaterally, respirations unlabored    Heart:   Regular rate and rhythm, S1 and S2 normal, no murmur, rub    or gallop    Abdomen:    Soft, non-tender, +bowel sounds   Breast Exam:    deferred   Genitalia:    deferred   Extremities:   Extremities normal, atraumatic, no cyanosis or edema   Skin:   Skin color, texture, turgor normal, no rashes or lesions   Neurologic:   Grossly intact   Results Review  LABS:  Lab Results   Component Value Date    WBC 6.62 10/10/2019    HGB 17.4 10/10/2019    HCT 53.5 (H) 10/10/2019    MCV 91.6 10/10/2019     10/10/2019    NEUTROABS 4.33 10/10/2019    GLUCOSE 96 10/10/2019    BUN 20 10/10/2019    CREATININE 0.80 10/10/2019    EGFRIFNONA 95 10/10/2019     10/10/2019    K 4.2 10/10/2019    CL 97 (L) 10/10/2019    CO2 28.0 10/10/2019    CALCIUM 9.9 10/10/2019    ALBUMIN 4.70 07/23/2018    AST 35 (H) 07/23/2018    ALT 62 (H) 07/23/2018    BILITOT 0.6 07/23/2018       RADIOLOGY:  Imaging Results (last 72 hours)     ** No results found for the last 72 hours. **          I reviewed the patient's new clinical results.     10/10/19 ECG: reviewed    10/22/18 TTE:  Interpretation Summary     · Left ventricular systolic function is normal. Estimated EF = 60%.  · Left ventricular diastolic dysfunction (grade I) consistent with impaired relaxation.  · Left ventricular wall thickness is consistent with mild concentric hypertrophy.  · Mild-to-moderate mitral valve regurgitation is present  · Calculated right ventricular systolic pressure from tricuspid regurgitation is 19 mmHg.       Cancer Staging (if applicable)  Cancer Patient: __ yes _X_no __unknown; If yes, clinical stage T:__ N:__M:__, stage group or __N/A    Impression: Right knee osteoarthritis    Plan: Right total knee arthroplasty      Monique Simeon, PAZ   10/23/2019   8:54 AM

## 2019-10-23 NOTE — PROGRESS NOTES
Discharge Planning Assessment  [facility]     Patient Name: Harvey Levine  MRN: [id number]  Today's Date: 10/23/2019    Admit Date: 10/23/2019    Discharge Needs Assessment     Row Name 10/23/19 1509       Living Environment    Lives With  spouse    Name(s) of Who Lives With Patient  Michelle Villatoro    Current Living Arrangements  home/apartment/condo    Primary Care Provided by  self    Provides Primary Care For  no one    Family Caregiver if Needed  spouse    Family Caregiver Names  Michelle Villatoro    Quality of Family Relationships  helpful;supportive;involved    Able to Return to Prior Arrangements  yes       Resource/Environmental Concerns    Resource/Environmental Concerns  home accessibility    Home Accessibility Concerns  stairs to enter home 4 steps to enter home       Transition Planning    Patient/Family Anticipates Transition to  home    Patient/Family Anticipated Services at Transition  rehabilitation services       Discharge Needs Assessment    Readmission Within the Last 30 Days  no previous admission in last 30 days    Concerns to be Addressed  discharge planning    Equipment Currently Used at Home  bipap/cpap;walker, rolling;cane, straight    Anticipated Changes Related to Illness  none    Equipment Needed After Discharge  none    Outpatient/Agency/Support Group Needs  outpatient therapy    Discharge Facility/Level of Care Needs  outpatient therapy    Offered/Gave Vendor List  yes    Patient's Choice of Community Agency(s)  Patient requested [facility] followed by [facility]        Discharge Plan     Row Name 10/23/19 1511       Plan    Plan  Home with [facility] followed by [facility]    Patient/Family in Agreement with Plan  yes    Plan Comments  Spoke with Mr. Levine. He lives with his wife in [facility]. He is independent at baseline. In preparation for surgery, he purchased a rolling walker and cane.  He also has a CPAP. He has 4 steps to enter his  home and steps within his home that he will not have to use. He specifically requests [facility] followed by [facility]. Called and faxed referral to [facility].     Final Discharge Disposition Code  01 - home or self-care        Therapy - Selection Complete      Service Provider Request Status Selected Services Address Phone Number Fax Number    [facility] Selected [facility]patient [facility] Therapy [address] [place] [id number] [phone]     Demographic Summary     Row Name 10/23/19 1508       General Information    Admission Type  observation    Arrived From  home    Referral Source  admission list    Reason for Consult  discharge planning        Functional Status     Row Name 10/23/19 1509       Functional Status    Usual Activity Tolerance  good    Current Activity Tolerance  moderate       Functional Status, IADL    Medications  independent    Meal Preparation  independent    Housekeeping  independent    Laundry  independent    Shopping  independent       Mental Status    General Appearance WDL  WDL       Mental Status Summary    Recent Changes in Mental Status/Cognitive Functioning  no changes      Felicitas Higgins, RN

## 2019-10-23 NOTE — OP NOTE
OPERATIVE REPORT     DATE OF PROCEDURE: 10/23/2019    SURGEON: Luis Harkins M.D.     ASSISTANT(S): Circulator: Justin Gee RN; Ana Luisa Martin RN; Benita Mendoza RN  Scrub Person: Leah Shelton; Gary Hedrick  Vendor Representative: Jairo Peraza  Nursing Assistant: Carly Courtney  Assistant: Sara Ruff PA     PREOPERATIVE DIAGNOSIS: Advanced degenerative joint disease of the right knee secondary to osteoarthritis    POSTOPERATIVE DIAGNOSIS: same     PROCEDURE: Right total Knee Arthroplasty     SURGICAL DETAILS:     APPROACH: Medial parapatellar     ANESTHESIA: Spinal plus local periarticular block    PREOPERATIVE ANTIBIOTICS: Ancef 2 g IV    TRANEXAMIC ACID: IV    TOURNIQUET TIME: 58 min @300 mmHg     ESTIMATED BLOOD LOSS: 50 cc     SPECIMENS: None    IMPLANTS:   /Brand: Ravi triathlon  Tibial component size: 3 standard baseplate   Femoral component size: 4 cruciate retaining   Tibial polyethylene insert: 9 mm cruciate stabilizing   Patellar component: 32 mm asymmetric   Cement: 2 bags Simplex P medium viscosity without antibiotics    DRAINS: None    LOCAL INJECTION: 1 cc Toradol 30mg/ml, 4 cc duramorph 2mg/ml, 20 cc 0.5% ropivicaine, 20 cc 0.5% lidocaine with 1:200,000 epinephrine, 15 cc preservative free normal saline     MODIFIER(S): None    COMPLICATIONS: None apparent    INDICATIONS FOR PROCEDURE: The patient has a long history of progressive knee pain, arthritis, and degeneration resulting in deformity in the right knee from predominantly medial wear and bone loss. Non-operative treatment and conservative therapeutic measures have been attempted, but have not improved or controlled the symptoms and pain that occurs during normal daily activities. Knee motion has also become limited and is restricting the patient. Total knee arthroplasty was recommended. The risks, benefits, alternatives, and potential complications of the arthroplasty surgery were discussed with the patient  in detail to include but not be limited to infection, bleeding, anesthesia risks, damage to neurovascular structures, osteolysis, aseptic loosening, instability, anterior knee pain, continued pain, iatrogenic fracture, dislocation, need for future surgery including the potential for amputation, blood clots, myocardial infarction, stroke, and death. Specific details of the surgical procedure, hospitalization, recovery, rehabilitation, and long-term precautions were also presented. Pre-operative teaching was provided. Implant/prosthesis selection was outlined, and the many options available were explained; the final choice will be made at the time of the procedure to match the anatomy and condition of the bone, ligaments, tendons, and muscles. The patient completed preoperative medical optimization and risk assessment, joint arthroplasty education, and MRSA decolonization using a universal decolonization protocol. Perioperative blood management and the potential for blood transfusion were discussed with risks and options clearly outlined.     INTRAOPERATIVE FINDINGS: Advanced tricompartmental arthritis with genu varum alignment    PROCEDURE: The patient was identified in the preoperative holding area. The operative site was confirmed and marked. SAGRARIO hose and a sequential compression device were placed on the nonoperative leg. The risks, benefits, and alternatives to surgery were again confirmed with the patient and the patient wished to proceed. The patient was brought to the operating room and placed on the operating room table in the supine position. All bony prominences were padded. A huddle was performed with the patient and all vital surgical team members to confirm the correct operative site, procedure, anesthesia type, and operative plan with the patient. After anesthesia was performed, a tourniquet was applied to the upper thigh of the operative leg. A full knee exam was performed once anesthesia was in full  effect. Intravenous antibiotic prophylaxis was given and confirmed with the anesthesia team.     The operative leg was prepped and draped in the usual sterile fashion. A surgical time out was performed immediately preceding the incision with all personnel in the operating room to confirm patient identity, the correct operative site and extremity, correct radiographic studies, availability of appropriate surgical equipment and agreement on the planned procedure. The operative knee was elevated and exsanguinated using an esmarch and the tourniquet was inflated. The knee was exposed using a limited anterior-midline skin incision. Dissection was carried down through skin and subcutaneous tissue to the extensor mechanism with a scalpel. A medial parapatellar arthrotomy was made to enter the knee space sharply. A large amount of normal appearing joint fluid was encountered and suctioned. The synovium was thickened, hypertrophic, and inflamed. A partial synovectomy was performed for exposure, and the medial and lateral gutters were cleared of scar and synovial reflections. The superficial medial collateral ligament was carefully elevated off osteophytes which were then removed with a rongeur and osteotome. Degenerative meniscal remnants were excised medially and laterally. The patellar synovial reflections were released and the patella exposed to reveal complete wear through the articular surface. The trochlea demonstrated similar severe wear. The patella was then subluxed laterally. The knee was then flexed up to 90 degrees.     Assessment of the knee joint revealed severe end-stage articular damage with no remaining medial weight bearing cartilage. The medial compartment was severely eburnated with bone loss on the medial tibia and medial femoral condyle, resulting in the varus deformity. Osteophytes were removed from the notch. The ACL was resected. Osteophytes were then further debrided from the femur and the tibia.      Attention was then turned to the femur. The medullary cavity of the femur was entered anterior-medial to the intra-condylar notch above the PCL with a 5/6th inch step drill. The femoral canal was over-reamed, irrigated, and suctioned to prevent fat embolization. An intramedullary alignment system was then placed, fixed to the femur with pins, and the distal femoral osteotomy was made in 5 degrees of valgus with an 8 mm resection. Attention was then turned to the tibia. Retractors were placed medially and laterally to protect the collateral ligaments and a Willy retractor was placed around the PCL in the intra-condylar notch. The tibia was then subluxed anteriorly for wide exposure. An extramedullary alignment system was then placed and the proximal tibial osteotomy was made measuring 1-2 mm off the most affected side and 9-10 mm off the unaffected side with approximately 3 degrees posterior slope. The guide was also confirmed to be perpendicular to the tibial axis prior to the osteotomy. A sizing guide was then used to select the tibial component size. The knee was then brought to extension and the appropriate sized spacer block was placed. This brought the knee to full extension with excellent medial and lateral balance.     The flexion gap was then inspected and measured both visually and with a tensioner device to assess the medial and lateral flexion balance. A femur posterior reference guide was placed in 6 degrees of external rotation in accordance with the soft tissue balance. This resulted in symmetric flexion and extension gaps and was verified against the epicondylar axis and Delmi's line. The femur was sized using a posterior referencing guide and, using the previously determined degrees of rotation, drill holes were made for the cutting block. The 4 in 1 cutting block was impacted into place and secured. Cuts were completed using a saw with the collaterals protected by Z-retractors. Care was  taken to preserve the PCL. A lamina  was placed with the knee in 90 degrees of flexion and a large curved osteotome, rongeur, and curettes were utilized to clear posterior osteophytes, loose bodies and meniscal remnants.     A femoral trial implant was placed; excellent fit was confirmed. The medial-lateral and anterior-posterior dimensions were checked; anatomic fit and coverage were achieved.The proximal tibia baseplate trial was placed with its mid-point at the junction of medial one-third and lateral two-thirds of the tibial tubercle and pinned to this fixed position. A trial reduction was then performed. Trial reduction demonstrated the knee achieved full extension with excellent stability and range of motion, and no tendency toward instability with varus-valgus stress at full extension, mid-flexion, or 90 degrees of flexion. The PCL was also found to be appropriately tensioned with normal posterior tibial excursion.     Next, attention was turned to the patella. It was measured and the posterior 9-10 mm was resected leaving a healthy remnant with greater than 11mm thickness. The patella was sized with the asymmetric guide, and drill holes were made. A trial button was placed and tracking of the patella and the entire knee trial was tested. The patella tracking was excellent throughout range of motion with no instability. Punches and drills were then placed through the trials to accommodate the final implants. All trials were removed.     The wound was copiously lavaged with a pulse irrigation/suction system. The posterior recess of the knee and areas of known bleeding were treated with the electrocautery to reduce post-operative bleeding. A pain cocktail was injected into the chris-articular tissues. The cut bone surfaces were then irrigated again, suctioned, and dried. A double batch of Polymethylmethacrylate (PMMA) bone cement was mixed, and the final implants were cemented into place, tibia followed  by femur followed by patella. The cement was compressed using a non-constrained poly trial which was removed so the excess cement could be curetted free. The final polyethylene was impacted into place, and the knee was held in extension until the cement cured. The tourniquet was released and no excessive bleeding was encountered. Synovial bleeding was further treated with the electrocautery until adequate hemostasis was obtained.     The wound was again irrigated with dilute betadine solution followed by saline. The extensor mechanism and capsule was then anatomically closed with interrupted #1 Vicryl suture and a running #2 Stratafix stitch. Knee stability and range of motion with the capsule closed was excellent, and range of motion was 0 to 135 without excessive stress on the repair. Instrument and sponge count was completed and confirmed correct. Deep and superficial subcutaneous tissue was closed with interrupted 2-0 Vicryl suture. A running 3-0 Monocryl subcuticular stitch was used to re-approximate the skin edges followed by skin glue adhesive to seal the wound. A silver impregnated dressing was then placed, followed by SAGRARIO hose, and a sequential compression device to the operative limb. The patient was sufficiently recovered from anesthesia, transferred to a hospital bed and taken to the PACU in stable condition.     One gram (1000 mg) of intravenous tranexamic acid was administered prior to incision. A second one gram (1000 mg) intravenous dose was given prior to wound closure.    No apparent complications occurred during the procedure. Instrument, sponge and needle counts were correct x 2.     The patient underwent risk stratification preoperatively and aspirin was chosen for DVT prophylaxis. Delay in starting chemical prophylaxis for 23 hours from surgical incision was over concerns for hematoma formation and wound related issues.     POST OPERATIVE PLAN:   Weight bearing as tolerated with knee range of  motion as tolerated   Pain control with PO/IV meds   Adductor canal catheter placement by Anesthesia Pain Management Team in PACU.   23 hours perioperative antibiotic prophylaxis   PT/OT for mobilization and medical equipment needs   Keep silver dressing in place for 7 days post op. Change dressing only if saturated.   SAGRARIO hose and SCDs to bilateral lower extremities   Social work for discharge planning needs   Follow up in 3 weeks for post operative wound check with XR AP and lateral of operative knee.

## 2019-10-23 NOTE — H&P
Patient Name: Harvey Levine  MRN: [id number]  : 1950  DOS: 10/23/2019    Attending: Luis Harkins MD    Primary Care Provider: Antonia Conner MD      Chief complaint:  Right knee pain    Subjective   Patient is a 69 y.o. male presented for right total knee arthroplasty by Dr. Harkins.    He underwent surgery under SA. He tolerated surgery well , had adductor canal nerve block catheter placed in PACU.  And is admitted for further medical management.     Seen in his room after surgery, doing very well.  Good pain control.  Spinal anesthesia effects are starting to subside.    No complaints of nausea, vomiting, or shortness of breath.  No complaints of postoperative pain at this point.    Has history of sleep apnea, uses CPAP.  He has hearing deficit, has hearing aids.    Allergies:  Allergies   Allergen Reactions   • Crestor [Rosuvastatin Calcium] Other (See Comments) and Myalgia     Liver failure   • Lipitor [Atorvastatin] Other (See Comments) and Myalgia     Liver failure   • Lisinopril Other (See Comments)     Liver failure   • Zetia [Ezetimibe] Other (See Comments)     Liver failure   • Adhesive Tape Other (See Comments)     Tearing of the skin       Meds:  Medications Prior to Admission   Medication Sig Dispense Refill Last Dose   • ALPRAZolam (XANAX) 0.25 MG tablet Take 0.25 mg by mouth 2 (Two) Times a Day As Needed (RESTLESS LEG).   Past Month   • amLODIPine (NORVASC) 10 MG tablet Take 10 mg by mouth Daily.   10/22/2019   • clobetasol (TEMOVATE) 0.05 % gel Apply 1 application topically to the appropriate area as directed Daily. ON GUMS   10/22/2019   • ketoconazole (NIZORAL) 2 % cream Apply 1 application topically to the appropriate area as directed Every Night.   10/23/2019   • rOPINIRole (REQUIP) 0.5 MG tablet Take 1 hour before bedtime. And 1 at lunch (Patient taking differently: Take 0.5 mg by mouth 2 (Two) Times a Day. Take 1 hour before bedtime. And 1 at lunch) 180 tablet 3  10/22/2019   • sertraline (ZOLOFT) 50 MG tablet Take 75 mg by mouth Daily.   10/22/2019   • telmisartan (MICARDIS) 80 MG tablet Take 80 mg by mouth Daily.   10/22/2019   • tiZANidine (ZANAFLEX) 4 MG tablet Take 4 mg by mouth Every 8 (Eight) Hours As Needed.   10/22/2019   • amLODIPine (NORVASC) 2.5 MG tablet    Taking   • terbinafine (lamiSIL) 250 MG tablet Take 250 mg by mouth Every 2 (Two) Months. TAKE 1 TABLET BY MOUTH X 7 DAYS EVERY OTHER MONTH   More than a month       History:   Past Medical History:   Diagnosis Date   • Anxiety    • Arthritis    • Hearing loss     WEAR AIDS   • Hyperlipidemia    • Hypertension    • Liver failure, acute     RELATED TO STATIN USE; RESOLVED.   • MVP (mitral valve prolapse)    • Psoriasis    • RLS (restless legs syndrome)    • Sleep apnea     WILL BRING MASK, TUBING, AND SETTINGS   • Wears glasses      Past Surgical History:   Procedure Laterality Date   • COLONOSCOPY  SPRING 2019   • ENDOSCOPY  09/29/2017    Dr. Medrano   • HERNIA REPAIR     • LIPOMA EXCISION Right     SHOULDER   • LIVER BIOPSY  02/20/2012   • MOUTH BIOPSY  05/25/2011   • NEUROMA SURGERY Right     KNEE   • SKIN BIOPSY  2018    Dr. Héctor Bernal   • VASECTOMY  01/01/1992     Urology     Family History   Problem Relation Age of Onset   • Breast cancer Mother    • Pancreatic cancer Mother    • Stomach cancer Maternal Grandmother    • Heart failure Father      Social History     Tobacco Use   • Smoking status: Never Smoker   • Smokeless tobacco: Never Used   Substance Use Topics   • Alcohol use: Yes     Alcohol/week: 8.4 oz     Types: 14 Glasses of wine per week   • Drug use: No   .  Drinks a couple of glass of wine every evening.  Retired.    Review of Systems  Pertinent items are noted in HPI, all other systems reviewed and negative    Vital Signs  Visit Vitals  /76 (BP Location: Left arm, Patient Position: Lying)   Pulse 80   Temp 98 °F (36.7 °C) (Temporal)   Resp 16   SpO2 96%       Physical  Exam:    General Appearance:    Alert, cooperative, in no acute distress   Head:    Normocephalic, without obvious abnormality, atraumatic   Eyes:            Lids and lashes normal, conjunctivae and sclerae normal, no   icterus, no pallor, corneas clear   Ears:    Ears appear intact with no abnormalities noted   Neck:   No adenopathy, supple, trachea midline, no thyromegaly   Lungs:     Clear to auscultation, respirations regular, even and                   unlabored    Heart:    Regular rhythm and normal rate, normal S1 and S2, no            murmur, no gallop   Abdomen:     Normal bowel sounds, no masses, no organomegaly, soft        non-tender, non-distended, no guarding, no rebound                 tenderness   Genitalia:    Deferred   Extremities:  Clean dry and intact Ace on the right knee.  Adductor canal nerve block catheter present.   Pulses:   Pulses palpable and equal bilaterally   Skin:   No bleeding, bruising or rash   Neurologic:   Cranial nerves 2 - 12 grossly intact, starting to move lower extremities with subsiding spinal anesthesia effects.  Able to flex and dorsiflex bilateral feet.      I reviewed the patient's new clinical results.       Lab Results   Component Value Date    HGBA1C 5.30 10/10/2019     Results for ZOILA RICHARDSON (MRN [id number]) as of 10/23/2019 13:03   Ref. Range 10/10/2019 16:01   WBC Latest Ref Range: 3.40 - 10.80 10*3/mm3 6.62   RBC Latest Ref Range: 4.14 - 5.80 10*6/mm3 5.84 (H)   Hemoglobin Latest Ref Range: 13.0 - 17.7 g/dL 17.4   Hematocrit Latest Ref Range: 37.5 - 51.0 % 53.5 (H)   RDW Latest Ref Range: 12.3 - 15.4 % 13.5   MCV Latest Ref Range: 79.0 - 97.0 fL 91.6   MCH Latest Ref Range: 26.6 - 33.0 pg 29.8   MCHC Latest Ref Range: 31.5 - 35.7 g/dL 32.5   MPV Latest Ref Range: 6.0 - 12.0 fL 9.3   Platelets Latest Ref Range: 140 - 450 10*3/mm3 199   Results for ZOILA RICHARDSON (MRN [id number]) as of 10/23/2019 13:03   Ref. Range 10/10/2019 15:02   Glucose  Latest Ref Range: 65 - 99 mg/dL 96   Sodium Latest Ref Range: 136 - 145 mmol/L 137   Potassium Latest Ref Range: 3.5 - 5.2 mmol/L 4.2   CO2 Latest Ref Range: 22.0 - 29.0 mmol/L 28.0   Chloride Latest Ref Range: 98 - 107 mmol/L 97 (L)   Anion Gap Latest Ref Range: 5.0 - 15.0 mmol/L 12.0   Creatinine Latest Ref Range: 0.76 - 1.27 mg/dL 0.80   BUN Latest Ref Range: 8 - 23 mg/dL 20   BUN/Creatinine Ratio Latest Ref Range: 7.0 - 25.0  25.0   Calcium Latest Ref Range: 8.6 - 10.5 mg/dL 9.9     Assessment and Plan:     Status post total right knee replacement    Moderate obstructive sleep apnea    Restless legs syndrome (RLS)    HTN (hypertension)      Plan  1. PT/OT- early ambulation postop.  Protected weightbearing as tolerated right lower extremity  2. Pain control-prns, AC nerve block   3. IS-encourage  4. DVT proph- mechs/ASA  5. Bowel regimen  6. Resume home medications as appropriate  7. Monitor post-op labs  8. Discharge planning     HUGH  - CPAP at night    HTN  - Continue home norvasc and cozaar  - Monitor BP   - Holding parameters for BP meds  - Labetalol PRN for SBP>170    RLS  - continue home reqiup and xanax    Discussed with patient and wife.    Israel Mullins MD  10/23/19  2:31 PM

## 2019-10-23 NOTE — DISCHARGE PLACEMENT REQUEST
"Harvey Levine (69 y.o. Male)     Felicitas Higgins, RN  [phone]      Date of Birth Social Security Number Address Home Phone MRN    1950  [address] [place], [place] [address] [phone] [id number]    Jainism Marital Status          None        Admission Date Admission Type Admitting Provider Attending Provider Department, Room/Bed    10/23/19 Elective Luis Harkins MD Luckett, Matthew R, MD [address], [address]/1    Discharge Date Discharge Disposition Discharge Destination                       Attending Provider:  Luis Harkins MD    Allergies:  Crestor [Rosuvastatin Calcium], Lipitor [Atorvastatin], Lisinopril, Zetia [Ezetimibe], Adhesive Tape    Isolation:  None   Infection:  None   Code Status:  CPR    Ht:  157.5 cm (62\")   Wt:  69.7 kg (153 lb 10.6 oz)    Admission Cmt:  None   Principal Problem:  Status post total right knee replacement [Z96.651]                 Active Insurance as of 10/23/2019     Primary Coverage     Payor Plan Insurance Group Employer/Plan Group    MEDICARE MEDICARE A & B      Payor Plan Address Payor Plan Phone Number Payor Plan Fax Number Effective Dates    [address] [phone]  4/1/2013 - None Entered    [facility] [id number]       Subscriber Name Subscriber Birth Date Member ID       HARVEY LEVINE 1950 [id number]           Secondary Coverage     Payor Plan Insurance Group Employer/Plan Group    [facility] SUPP [id number]     Payor Plan Address Payor Plan Phone Number Payor Plan Fax Number Effective Dates    [address]   4/1/2013 - None Entered    [facility] [id number]       Subscriber Name Subscriber Birth Date Member ID       HARVEY LEVINE 1950 [id number]                 Emergency Contacts      (Rel.) Home Phone Work Phone Mobile Phone    Michelle Levine (Spouse) [phone] -- [phone]        [address]  [address] [facility] " [address]  Phone:  [phone]  Fax:   Date: Oct 23, 2019      Ambulatory Referral to Physical Therapy Evaluate and treat     Patient:  Harvey Levine MRN:  [id number]   [address]  [facility] [address] :  1950  SSN:    Phone: [phone] Sex:  M      INSURANCE PAYOR PLAN GROUP # SUBSCRIBER ID   Primary:  Secondary:    MEDICARE ANTHEM BLUE CROSS [id number]  [id number]    [id number]  [id number]      Referring Provider Information:  LALY MCGARRY Phone: [phone] Fax:       Referral Information:   # Visits:  1 Referral Type: Therapy [AE1]   Urgency:  Routine Referral Reason: Specialty Services Required   Start Date: Oct 23, 2019 End Date:  To be determined by Insurer   Diagnosis: Status post total right knee replacement (Z96.651 [ICD-10-CM] V43.65 [ICD-9-CM])      Refer to Dept:   Refer to Provider:   Refer to Facility:       Specialty needed: Evaluate and treat     This document serves as a request of services and does not constitute Insurance authorization or approval of services.  To determine eligibility, please contact the members Insurance carrier to verify and review coverage.     If you have medical questions regarding this request for services. Please contact [address] at [phone] between the hours of 8:00am - 5:00pm (Mon-Fri).       Verbal Order Mode: Verbal with readback   Authorizing Provider: Laly Mcgarry MD  Authorizing Provider's NPI: [id number]     Order Entered By: Felicitas Gallagher RN 10/23/2019  3:06 PM                 Operative/Procedure Notes (last 24 hours) (Notes from 10/22/19 1507 through 10/23/19 1507)      Laly Mcgarry MD at 10/23/19 1027        OPERATIVE REPORT     DATE OF PROCEDURE: 10/23/2019    SURGEON: Laly Mcgrary M.D.     ASSISTANT(S): Circulator: Justin Gee RN; Ana Luisa Martin RN; Benita Mendoza RN  Scrub Person: Leah Shelton; Gary Hedrick  Vendor Representative: Karmen  Jairo  Nursing Assistant: Carly Courtney  Assistant: Sara Ruff PA     PREOPERATIVE DIAGNOSIS: Advanced degenerative joint disease of the right knee secondary to osteoarthritis    POSTOPERATIVE DIAGNOSIS: same     PROCEDURE: Right total Knee Arthroplasty     SURGICAL DETAILS:     APPROACH: Medial parapatellar     ANESTHESIA: Spinal plus local periarticular block    PREOPERATIVE ANTIBIOTICS: Ancef 2 g IV    TRANEXAMIC ACID: IV    TOURNIQUET TIME: 58 min @300 mmHg     ESTIMATED BLOOD LOSS: 50 cc     SPECIMENS: None    IMPLANTS:   /Brand: Cowan triathlon  Tibial component size: 3 standard baseplate   Femoral component size: 4 cruciate retaining   Tibial polyethylene insert: 9 mm cruciate stabilizing   Patellar component: 32 mm asymmetric   Cement: 2 bags Simplex P medium viscosity without antibiotics    DRAINS: None    LOCAL INJECTION: 1 cc Toradol 30mg/ml, 4 cc duramorph 2mg/ml, 20 cc 0.5% ropivicaine, 20 cc 0.5% lidocaine with 1:200,000 epinephrine, 15 cc preservative free normal saline     MODIFIER(S): None    COMPLICATIONS: None apparent    INDICATIONS FOR PROCEDURE: The patient has a long history of progressive knee pain, arthritis, and degeneration resulting in deformity in the right knee from predominantly medial wear and bone loss. Non-operative treatment and conservative therapeutic measures have been attempted, but have not improved or controlled the symptoms and pain that occurs during normal daily activities. Knee motion has also become limited and is restricting the patient. Total knee arthroplasty was recommended. The risks, benefits, alternatives, and potential complications of the arthroplasty surgery were discussed with the patient in detail to include but not be limited to infection, bleeding, anesthesia risks, damage to neurovascular structures, osteolysis, aseptic loosening, instability, anterior knee pain, continued pain, iatrogenic fracture, dislocation, need for future surgery  including the potential for amputation, blood clots, myocardial infarction, stroke, and death. Specific details of the surgical procedure, hospitalization, recovery, rehabilitation, and long-term precautions were also presented. Pre-operative teaching was provided. Implant/prosthesis selection was outlined, and the many options available were explained; the final choice will be made at the time of the procedure to match the anatomy and condition of the bone, ligaments, tendons, and muscles. The patient completed preoperative medical optimization and risk assessment, joint arthroplasty education, and MRSA decolonization using a universal decolonization protocol. Perioperative blood management and the potential for blood transfusion were discussed with risks and options clearly outlined.     INTRAOPERATIVE FINDINGS: Advanced tricompartmental arthritis with genu varum alignment    PROCEDURE: The patient was identified in the preoperative holding area. The operative site was confirmed and marked. SAGRARIO hose and a sequential compression device were placed on the nonoperative leg. The risks, benefits, and alternatives to surgery were again confirmed with the patient and the patient wished to proceed. The patient was brought to the operating room and placed on the operating room table in the supine position. All bony prominences were padded. A huddle was performed with the patient and all vital surgical team members to confirm the correct operative site, procedure, anesthesia type, and operative plan with the patient. After anesthesia was performed, a tourniquet was applied to the upper thigh of the operative leg. A full knee exam was performed once anesthesia was in full effect. Intravenous antibiotic prophylaxis was given and confirmed with the anesthesia team.     The operative leg was prepped and draped in the usual sterile fashion. A surgical time out was performed immediately preceding the incision with all personnel  in the operating room to confirm patient identity, the correct operative site and extremity, correct radiographic studies, availability of appropriate surgical equipment and agreement on the planned procedure. The operative knee was elevated and exsanguinated using an esmarch and the tourniquet was inflated. The knee was exposed using a limited anterior-midline skin incision. Dissection was carried down through skin and subcutaneous tissue to the extensor mechanism with a scalpel. A medial parapatellar arthrotomy was made to enter the knee space sharply. A large amount of normal appearing joint fluid was encountered and suctioned. The synovium was thickened, hypertrophic, and inflamed. A partial synovectomy was performed for exposure, and the medial and lateral gutters were cleared of scar and synovial reflections. The superficial medial collateral ligament was carefully elevated off osteophytes which were then removed with a rongeur and osteotome. Degenerative meniscal remnants were excised medially and laterally. The patellar synovial reflections were released and the patella exposed to reveal complete wear through the articular surface. The trochlea demonstrated similar severe wear. The patella was then subluxed laterally. The knee was then flexed up to 90 degrees.     Assessment of the knee joint revealed severe end-stage articular damage with no remaining medial weight bearing cartilage. The medial compartment was severely eburnated with bone loss on the medial tibia and medial femoral condyle, resulting in the varus deformity. Osteophytes were removed from the notch. The ACL was resected. Osteophytes were then further debrided from the femur and the tibia.     Attention was then turned to the femur. The medullary cavity of the femur was entered anterior-medial to the intra-condylar notch above the PCL with a 5/6th inch step drill. The femoral canal was over-reamed, irrigated, and suctioned to prevent fat  embolization. An intramedullary alignment system was then placed, fixed to the femur with pins, and the distal femoral osteotomy was made in 5 degrees of valgus with an 8 mm resection. Attention was then turned to the tibia. Retractors were placed medially and laterally to protect the collateral ligaments and a Willy retractor was placed around the PCL in the intra-condylar notch. The tibia was then subluxed anteriorly for wide exposure. An extramedullary alignment system was then placed and the proximal tibial osteotomy was made measuring 1-2 mm off the most affected side and 9-10 mm off the unaffected side with approximately 3 degrees posterior slope. The guide was also confirmed to be perpendicular to the tibial axis prior to the osteotomy. A sizing guide was then used to select the tibial component size. The knee was then brought to extension and the appropriate sized spacer block was placed. This brought the knee to full extension with excellent medial and lateral balance.     The flexion gap was then inspected and measured both visually and with a tensioner device to assess the medial and lateral flexion balance. A femur posterior reference guide was placed in 6 degrees of external rotation in accordance with the soft tissue balance. This resulted in symmetric flexion and extension gaps and was verified against the epicondylar axis and [place]'s line. The femur was sized using a posterior referencing guide and, using the previously determined degrees of rotation, drill holes were made for the cutting block. The 4 in 1 cutting block was impacted into place and secured. Cuts were completed using a saw with the collaterals protected by Z-retractors. Care was taken to preserve the PCL. A lamina  was placed with the knee in 90 degrees of flexion and a large curved osteotome, rongeur, and curettes were utilized to clear posterior osteophytes, loose bodies and meniscal remnants.     A femoral trial  implant was placed; excellent fit was confirmed. The medial-lateral and anterior-posterior dimensions were checked; anatomic fit and coverage were achieved.The proximal tibia baseplate trial was placed with its mid-point at the junction of medial one-third and lateral two-thirds of the tibial tubercle and pinned to this fixed position. A trial reduction was then performed. Trial reduction demonstrated the knee achieved full extension with excellent stability and range of motion, and no tendency toward instability with varus-valgus stress at full extension, mid-flexion, or 90 degrees of flexion. The PCL was also found to be appropriately tensioned with normal posterior tibial excursion.     Next, attention was turned to the patella. It was measured and the posterior 9-10 mm was resected leaving a healthy remnant with greater than 11mm thickness. The patella was sized with the asymmetric guide, and drill holes were made. A trial button was placed and tracking of the patella and the entire knee trial was tested. The patella tracking was excellent throughout range of motion with no instability. Punches and drills were then placed through the trials to accommodate the final implants. All trials were removed.     The wound was copiously lavaged with a pulse irrigation/suction system. The posterior recess of the knee and areas of known bleeding were treated with the electrocautery to reduce post-operative bleeding. A pain cocktail was injected into the chris-articular tissues. The cut bone surfaces were then irrigated again, suctioned, and dried. A double batch of Polymethylmethacrylate (PMMA) bone cement was mixed, and the final implants were cemented into place, tibia followed by femur followed by patella. The cement was compressed using a non-constrained poly trial which was removed so the excess cement could be curetted free. The final polyethylene was impacted into place, and the knee was held in extension until the  cement cured. The tourniquet was released and no excessive bleeding was encountered. Synovial bleeding was further treated with the electrocautery until adequate hemostasis was obtained.     The wound was again irrigated with dilute betadine solution followed by saline. The extensor mechanism and capsule was then anatomically closed with interrupted #1 Vicryl suture and a running #2 Stratafix stitch. Knee stability and range of motion with the capsule closed was excellent, and range of motion was 0 to 135 without excessive stress on the repair. Instrument and sponge count was completed and confirmed correct. Deep and superficial subcutaneous tissue was closed with interrupted 2-0 Vicryl suture. A running 3-0 Monocryl subcuticular stitch was used to re-approximate the skin edges followed by skin glue adhesive to seal the wound. A silver impregnated dressing was then placed, followed by SAGRARIO hose, and a sequential compression device to the operative limb. The patient was sufficiently recovered from anesthesia, transferred to a hospital bed and taken to the PACU in stable condition.     One gram (1000 mg) of intravenous tranexamic acid was administered prior to incision. A second one gram (1000 mg) intravenous dose was given prior to wound closure.    No apparent complications occurred during the procedure. Instrument, sponge and needle counts were correct x 2.     The patient underwent risk stratification preoperatively and aspirin was chosen for DVT prophylaxis. Delay in starting chemical prophylaxis for 23 hours from surgical incision was over concerns for hematoma formation and wound related issues.     POST OPERATIVE PLAN:   Weight bearing as tolerated with knee range of motion as tolerated   Pain control with PO/IV meds   Adductor canal catheter placement by Anesthesia Pain Management Team in PACU.   23 hours perioperative antibiotic prophylaxis   PT/OT for mobilization and medical equipment needs   Keep silver  dressing in place for 7 days post op. Change dressing only if saturated.   SAGRARIO hose and SCDs to bilateral lower extremities   Social work for discharge planning needs   Follow up in 3 weeks for post operative wound check with XR AP and lateral of operative knee.       Electronically signed by Luis Harkins MD at 10/23/19 9408     Luis Harkins MD at 10/23/19 1027        TOTAL KNEE ARTHROPLASTY  Progress Note    Harvey Levine  10/23/2019    Pre-op Diagnosis:   Primary osteoarthritis of right knee [M17.11]       Post-Op Diagnosis Codes:     * Primary osteoarthritis of right knee [M17.11]    Procedure/CPT® Codes:  LA TOTAL KNEE ARTHROPLASTY [82922]    Procedure(s):  TOTAL KNEE ARTHROPLASTY RIGHT    Surgeon(s):  Luis Harkins MD    Anesthesia: Spinal    Staff:   Circulator: Justin Gee RN; Ana Luisa Martin RN; Benita Mendoza RN  Scrub Person: Leah Shelton; Gary Hedrick  Vendor Representative: Jairo Peraza  Nursing Assistant: Carly Courtney  Assistant: Sara Ruff PA    Estimated Blood Loss: 50ml    Urine Voided: * No values recorded between 10/23/2019 10:04 AM and 10/23/2019 11:54 AM *    Specimens:                None          Drains:      Findings: Advanced tricompartmental osteoarthritis with varus alignment    Complications: None apparent      Luis Harkins MD     Date: 10/23/2019  Time: 11:54 AM        Electronically signed by Luis Harkins MD at 10/23/19 5709

## 2019-10-23 NOTE — PLAN OF CARE
Problem: Patient Care Overview  Goal: Plan of Care Review  Outcome: Ongoing (interventions implemented as appropriate)   10/23/19 9797   Coping/Psychosocial   Plan of Care Reviewed With patient   Plan of Care Review   Progress improving   OTHER   Outcome Summary Pt ambulates in [place] with CGA using RWx x 350 ft. Pt ROM measurement to start tomorrow. PADD score of 9. Pt expected to d/c home with assist and HHPT.

## 2019-10-23 NOTE — DISCHARGE INSTRUCTIONS
"DISCHARGE INSTRUCTIONS   Dr. Harkins     Total Knee Replacement/ Partial (Uni) Knee Replacement     Wound Care   1) Keep wound / incision area clean and dry.   2) Dressing to remain in place until post-operative day 7. Upon dressing removal, assess for wound drainage. If no drainage is present, keep wound / incision area open to air as much as possible. If drainage is present, place sterile dressing to cover wound and assess daily. If drainage continues to occur after post-operative day 14, call the office for an urgent appointment. (You should be seen in the clinic within 1-2 days of calling). DO NOT REMOVE SUTURES (IF PRESENT) UNDER ANY CIRCUMSTANCES PRIOR TO FOLLOW UP APPOINTMENT.  3) No baths or swimming until otherwise instructed. The wound must remain dry for 10 days after surgery. After 10 days, you may begin to shower only if no drainage is present. No submerging the wound under standing water until cleared by your physician (no baths, hot tubs, swimming pools, etc). Sponge baths are the best way to perform personal hygiene while at the same time protecting the wound from moisture.   4) Prior to showering, the wound must remain dry for 72 consecutive hours (no drainage whatsoever) prior to showering. If the wound drains or spots, the clock \"resets\" - make sure the wound has been drainage-free for 72 consecutive hours.   5) Once you are allowed to get the wound wet, please use gentle soap to wash the wound area. DO NOT aggressively scrub the wound with a washcloth or bath sponge. Please visually inspect your wound(s) at least once daily. If the wound(s) are in a difficult to see location, please use a mirror or have someone else assist with visual inspection.   6) No scrubbing the wound. You may \"pad dry\" the wound, but do not rub, as this may open up the wound and pre-dispose to wound infection.   7) Do not apply lotions or creams to incision site, unless instructed otherwise.   8) Observe for redness, " "swelling, or drainage. Please call the clinic immediately if you have fevers, chills with warmth/redness surrounding wound site or if you notice pus drainage from the wound site     Activity   No heavy lifting objects greater than 10 pounds.   No driving while on narcotic pain medication.   No submerging wound under standing water (pool, bath tub, etc.) until otherwise instructed.   You may be protected weightbearing as tolerated on your operative (right lower) extremity   Use crutches or a walker for ambulation.   Wean as appropriate per physical therapist's discretion.   Do not sleep with a pillow behind your knee. You may sleep with a pillow behind your Achilles or foot. This will prevent your knee from getting stiff in the flexed (\"bent\") position and will encourage full extension (leg straightening).   Be vigilant in terms of working on full knee extension and flexion. Your goal should be 0 to 90 degrees by 2 weeks post-op - MINIMUM!   Knee range of motion as tolerated.    Blood Clot Prophylaxis   (Aspirin vs. Lovenox administration is determined by your surgeon and tailored to your specific risk profile. You will be discharged with either of these medications, but not both.) You will need to complete a total 4 week course of enteric coated aspirin 325 mg twice daily, in order to minimize your risk of blood clots following surgery. You will be supplied with a prescription to obtain this. You will need to compete a total 2 week course of Lovenox after surgery, in order to minimize the risk of blood clots following surgery. This requires a single shot in the abdomen, to be taken once daily. You will be supplied with the prescription to obtain this. Prior to your discharge from the hospital, the nursing staff will instruct you on self-administration of the Lovenox, if you will be returning directly home from the hospital.     Discharge Pain Medications   You will be given a prescription for pain medication. You " "should start taking this the same day after your surgery. Wean off as tolerated. Do not wait to take the pain medication until the pain is severe, as it will be difficult to \"catch up\" once this occurs. The pain medication usually reaches its full effect ~1 hour after ingesting. If you have been sent home on Valium, this medication works well for muscle spasms. If you have been sent home on Colace, this medication should be taken until you are off all narcotic (i.e. Norco, Percocet, Oxycodone, etc) pain medications, in order to prevent constipation. Percocet or Norco have Tylenol in their ingredient lists. You must be careful not to exceed 4,000mg (4 grams) of Tylenol, from all sources, within a single 24-hr period. This means that you may not take more than 12 Percocets or Norcos within a 24-hr period. If you have been sent home with a combination of oxycodone and Tylenol, please take Tylenol as scheduled.  The oxycodone is to be taken as needed for \"breakthrough\" pain.  Do NOT take Regular or Extra Strength Tylenol when taking your Percocet or Norco medications. Some common side effects of the narcotic pain medications (Percocet, Oxycodone, Norco, etc) include nausea and itching. Benadryl is a great over the counter medication that helps calm your stomach, decreases your anxiety levels, and minimizes the itching. You can easily purchase this at your local pharmacy as an over-the-counter medication. Please abide by the instructions as printed on the bottle. If your nausea persists, make sure to take small amounts of crackers or other lighter foods.     Follow-Up   Follow-up with Dr. Harkins's office in 3 weeks from the surgery date for a post-operative evaluation. Have the following xrays done upon arrival to the follow-up appointment: 3 views of operative knee. Please call Dr. Harkins's office at [phone] for orthopaedic appointments or questions.  "

## 2019-10-23 NOTE — ANESTHESIA POSTPROCEDURE EVALUATION
Patient: Harvey Levine    Procedure Summary     Date:  10/23/19 Room / Location:   [facility]    Anesthesia Start:  1005 Anesthesia Stop:  1244    Procedure:  TOTAL KNEE ARTHROPLASTY RIGHT (Right Knee) Diagnosis:       Primary osteoarthritis of right knee      (Primary osteoarthritis of right knee [M17.11])    Surgeon:  Luis Harkins MD Provider:  Cooper Bautista MD    Anesthesia Type:  general ASA Status:  3          Anesthesia Type: general  Last vitals  BP   147/84 (10/23/19 0849)   Temp   96.7 °F (35.9 °C) (10/23/19 0849)   Pulse   52 (10/23/19 0849)   Resp   18 (10/23/19 0849)     SpO2   97 % (10/23/19 0849)     Post Anesthesia Care and Evaluation    Patient location during evaluation: PACU  Patient participation: complete - patient participated  Level of consciousness: awake and alert  Pain score: 0  Pain management: adequate  Airway patency: patent  Anesthetic complications: No anesthetic complications  PONV Status: none  Cardiovascular status: hemodynamically stable and acceptable  Respiratory status: nonlabored ventilation, acceptable and nasal cannula  Hydration status: acceptable

## 2019-10-23 NOTE — ANESTHESIA PREPROCEDURE EVALUATION
Anesthesia Evaluation                  Airway   Mallampati: I  TM distance: >3 FB  Neck ROM: full  No difficulty expected  Dental      Pulmonary    (+) sleep apnea,   Cardiovascular     ECG reviewed    (+) hypertension, valvular problems/murmurs, hyperlipidemia,     ROS comment: Normal echo    Neuro/Psych  (+) psychiatric history Anxiety,     GI/Hepatic/Renal/Endo    (+)  GERD,  liver disease,     Musculoskeletal     Abdominal    Substance History      OB/GYN          Other                        Anesthesia Plan    ASA 3     general   (Acb)  intravenous induction   Anesthetic plan, all risks, benefits, and alternatives have been provided, discussed and informed consent has been obtained with: patient.    Plan discussed with CRNA.

## 2019-10-23 NOTE — ANESTHESIA PROCEDURE NOTES
Peripheral Block      Patient reassessed immediately prior to procedure    Patient location during procedure: post-op  Reason for block: at surgeon's request and post-op pain management  Performed by  CRNA: Merissa Camacho CRNA  Assisted by: Zulma Maguire SRNA  Preanesthetic Checklist  Completed: patient identified, site marked, surgical consent, pre-op evaluation, timeout performed, IV checked, risks and benefits discussed and monitors and equipment checked  Prep:  Pt Position: supine  Sterile barriers:cap, gloves, mask and sterile barriers  Prep: ChloraPrep  Patient monitoring: blood pressure monitoring, continuous pulse oximetry and EKG  Procedure  Performed under: spinal  Guidance:ultrasound guided  Images:still images obtained, printed/placed on chart    Laterality:right  Block Type:adductor canal block  Injection Technique:catheter  Needle Type:Tuohy and echogenic  Needle Gauge:18 G  Resistance on Injection: none  Catheter Size:20 G (20g)  Cath Depth at skin: 9 cm    Medications Used: bupivacaine PF (MARCAINE) 0.25 % injection, 20 mL  Med admintered at 10/23/2019 12:19 PM      Post Assessment  Injection Assessment: negative aspiration for heme, incremental injection and no paresthesia on injection  Patient Tolerance:comfortable throughout block  Complications:no  Additional Notes  Procedure:             The pt was placed in the Supine position.  The Insertion site was  prepped and Draped in sterile fashion.  The pt was anesthetized with  IV Sedation( see meds).  Skin and cutaneous tissue was infiltrated and anesthetized with 1% Lidocaine 3 mls via a 25g needle.  A BBraun 4 inch 18g echogenic needle was then  inserted approximately midline, mid-thigh and advanced In-plane with Ultrasound guidance.  Normal Saline PSF was utilized for hydrodissection of tissue.  The Vastus medialis and Sartorius muscle where visualized and the needle tip was placed in the adductor canal,  lateral to the femoral  artery.  LA injection spread was visualized, injection was incremental 1-5ml, injection pressure was normal or little, no intraneural injection, no vascular injection.  LA dose was injected thru the needle(see dose above).  A BBraun 20g wire stylet catheter was placed via the needle with ultrasound visualization and confirmation with NS fluid bolus. The labeled Catheter was then secured to skin at insertion site with skin afix and steristrips to curled catheter and CHG transparent dressing.  Thank you.

## 2019-10-23 NOTE — ADDENDUM NOTE
Addendum  created 10/23/19 1245 by Merissa Camacho CRNA    Intraprocedure Event edited, Intraprocedure Staff edited

## 2019-10-24 VITALS
DIASTOLIC BLOOD PRESSURE: 81 MMHG | RESPIRATION RATE: 18 BRPM | OXYGEN SATURATION: 97 % | SYSTOLIC BLOOD PRESSURE: 116 MMHG | HEART RATE: 81 BPM | TEMPERATURE: 97.2 F

## 2019-10-24 PROBLEM — D72.829 LEUKOCYTOSIS: Status: ACTIVE | Noted: 2019-10-24

## 2019-10-24 PROBLEM — G89.18 ACUTE POSTOPERATIVE PAIN: Status: ACTIVE | Noted: 2019-10-24

## 2019-10-24 LAB
ANION GAP SERPL CALCULATED.3IONS-SCNC: 11 MMOL/L (ref 5–15)
BUN BLD-MCNC: 13 MG/DL (ref 8–23)
BUN/CREAT SERPL: 15.9 (ref 7–25)
CALCIUM SPEC-SCNC: 9.4 MG/DL (ref 8.6–10.5)
CHLORIDE SERPL-SCNC: 102 MMOL/L (ref 98–107)
CO2 SERPL-SCNC: 23 MMOL/L (ref 22–29)
CREAT BLD-MCNC: 0.82 MG/DL (ref 0.76–1.27)
DEPRECATED RDW RBC AUTO: 43.5 FL (ref 37–54)
ERYTHROCYTE [DISTWIDTH] IN BLOOD BY AUTOMATED COUNT: 13 % (ref 12.3–15.4)
GFR SERPL CREATININE-BSD FRML MDRD: 93 ML/MIN/1.73
GLUCOSE BLD-MCNC: 116 MG/DL (ref 65–99)
HCT VFR BLD AUTO: 41.8 % (ref 37.5–51)
HGB BLD-MCNC: 13.9 G/DL (ref 13–17.7)
MCH RBC QN AUTO: 30.2 PG (ref 26.6–33)
MCHC RBC AUTO-ENTMCNC: 33.3 G/DL (ref 31.5–35.7)
MCV RBC AUTO: 90.9 FL (ref 79–97)
PLATELET # BLD AUTO: 168 10*3/MM3 (ref 140–450)
PMV BLD AUTO: 9.9 FL (ref 6–12)
POTASSIUM BLD-SCNC: 4.4 MMOL/L (ref 3.5–5.2)
RBC # BLD AUTO: 4.6 10*6/MM3 (ref 4.14–5.8)
SODIUM BLD-SCNC: 136 MMOL/L (ref 136–145)
WBC NRBC COR # BLD: 11.05 10*3/MM3 (ref 3.4–10.8)

## 2019-10-24 PROCEDURE — A9270 NON-COVERED ITEM OR SERVICE: HCPCS | Performed by: ORTHOPAEDIC SURGERY

## 2019-10-24 PROCEDURE — 85027 COMPLETE CBC AUTOMATED: CPT | Performed by: NURSE PRACTITIONER

## 2019-10-24 PROCEDURE — 63710000001 LOSARTAN 50 MG TABLET: Performed by: NURSE PRACTITIONER

## 2019-10-24 PROCEDURE — 97535 SELF CARE MNGMENT TRAINING: CPT | Performed by: OCCUPATIONAL THERAPIST

## 2019-10-24 PROCEDURE — 63710000001 SERTRALINE 25 MG TABLET: Performed by: NURSE PRACTITIONER

## 2019-10-24 PROCEDURE — 80048 BASIC METABOLIC PNL TOTAL CA: CPT | Performed by: ORTHOPAEDIC SURGERY

## 2019-10-24 PROCEDURE — A9270 NON-COVERED ITEM OR SERVICE: HCPCS | Performed by: NURSE PRACTITIONER

## 2019-10-24 PROCEDURE — 97110 THERAPEUTIC EXERCISES: CPT | Performed by: PHYSICAL THERAPIST

## 2019-10-24 PROCEDURE — 63710000001 MELOXICAM 7.5 MG TABLET: Performed by: ORTHOPAEDIC SURGERY

## 2019-10-24 PROCEDURE — 63710000001 SERTRALINE 50 MG TABLET: Performed by: NURSE PRACTITIONER

## 2019-10-24 PROCEDURE — 63710000001 OXYCODONE 5 MG TABLET: Performed by: ORTHOPAEDIC SURGERY

## 2019-10-24 PROCEDURE — 63710000001 ROPINIROLE 0.5 MG TABLET: Performed by: NURSE PRACTITIONER

## 2019-10-24 PROCEDURE — 63710000001 ASPIRIN EC 325 MG TABLET DELAYED-RELEASE: Performed by: ORTHOPAEDIC SURGERY

## 2019-10-24 PROCEDURE — 99024 POSTOP FOLLOW-UP VISIT: CPT | Performed by: ORTHOPAEDIC SURGERY

## 2019-10-24 PROCEDURE — 25010000003 CEFAZOLIN IN DEXTROSE 2-4 GM/100ML-% SOLUTION: Performed by: ORTHOPAEDIC SURGERY

## 2019-10-24 PROCEDURE — 63710000001 AMLODIPINE 10 MG TABLET: Performed by: NURSE PRACTITIONER

## 2019-10-24 PROCEDURE — 97165 OT EVAL LOW COMPLEX 30 MIN: CPT | Performed by: OCCUPATIONAL THERAPIST

## 2019-10-24 PROCEDURE — 97116 GAIT TRAINING THERAPY: CPT | Performed by: PHYSICAL THERAPIST

## 2019-10-24 PROCEDURE — 63710000001 ACETAMINOPHEN 500 MG TABLET: Performed by: ORTHOPAEDIC SURGERY

## 2019-10-24 RX ORDER — OXYCODONE HYDROCHLORIDE 5 MG/1
5 TABLET ORAL EVERY 4 HOURS PRN
Qty: 40 TABLET | Refills: 0 | Status: SHIPPED | OUTPATIENT
Start: 2019-10-24 | End: 2019-10-28 | Stop reason: SDUPTHER

## 2019-10-24 RX ORDER — DOCUSATE SODIUM 100 MG/1
100 CAPSULE, LIQUID FILLED ORAL 2 TIMES DAILY PRN
Qty: 60 CAPSULE | Refills: 0 | Status: SHIPPED | OUTPATIENT
Start: 2019-10-24 | End: 2022-09-30

## 2019-10-24 RX ADMIN — LOSARTAN POTASSIUM 100 MG: 50 TABLET ORAL at 09:31

## 2019-10-24 RX ADMIN — AMLODIPINE BESYLATE 10 MG: 10 TABLET ORAL at 09:29

## 2019-10-24 RX ADMIN — ROPINIROLE 0.5 MG: 0.5 TABLET, FILM COATED ORAL at 09:29

## 2019-10-24 RX ADMIN — SERTRALINE HYDROCHLORIDE 75 MG: 25 TABLET ORAL at 09:56

## 2019-10-24 RX ADMIN — MELOXICAM 15 MG: 7.5 TABLET ORAL at 09:30

## 2019-10-24 RX ADMIN — ASPIRIN 325 MG: 325 TABLET, DELAYED RELEASE ORAL at 09:30

## 2019-10-24 RX ADMIN — CEFAZOLIN SODIUM 2 G: 2 INJECTION, SOLUTION INTRAVENOUS at 01:08

## 2019-10-24 RX ADMIN — OXYCODONE HYDROCHLORIDE 5 MG: 5 TABLET ORAL at 09:30

## 2019-10-24 RX ADMIN — ACETAMINOPHEN 1000 MG: 500 TABLET, FILM COATED ORAL at 01:08

## 2019-10-24 RX ADMIN — ACETAMINOPHEN 1000 MG: 500 TABLET, FILM COATED ORAL at 06:02

## 2019-10-24 NOTE — DISCHARGE SUMMARY
Patient Name: Harvey Levine  MRN: [id number]  : 1950  DOS: 10/24/2019    Attending: No att. providers found    Primary Care Provider: Antonia Conner MD    Date of Admission:.10/23/2019  7:56 AM    Date of Discharge:  10/24/2019    Discharge Diagnosis:     Status post total right knee replacement    Moderate obstructive sleep apnea    Restless legs syndrome (RLS)    HTN (hypertension)    Leukocytosis, mild, likely reactive    Acute postoperative pain      Hospital Course  Patient is a 69 y.o. male presented for right total knee arthroplasty by Dr. Harkins.     He underwent surgery under SA. He tolerated surgery well , had adductor canal nerve block catheter placed in PACU.  And was admitted for further medical management.     Has history of sleep apnea, uses CPAP.  He has hearing deficit, has hearing aids.      Patient was provided pain medications as needed for pain control, along with adductor canal nerve block infusion of Ropivacaine.      Adjustments were made to pain medications to optimize postop pain management. Risks and benefits of opiate medications discussed with patient.    He was seen by PT and OT and has progressed well over his stay.  He used an IS for atelectasis prophylaxis and aspirin along with mechanicals for DVT prophylaxis.    Home medications were resumed as appropriate, and labs were monitored and remained fairly stable.     With the progress he has made, he is ready for DC home today.    He will have an Arrow pump ( instructed on it during this admit).    Discussed with patient regarding plan and he shows understanding and agreement.    Patient will have HHPT following discharge.      Procedures Performed  DATE OF PROCEDURE: 10/23/2019     SURGEON: Luis Harkins M.D.      PREOPERATIVE DIAGNOSIS: Advanced degenerative joint disease of the right knee secondary to osteoarthritis     POSTOPERATIVE DIAGNOSIS: same      PROCEDURE: Right total Knee Arthroplasty     Pertinent  Test Results:    I reviewed the patient's new clinical results.   Results from last 7 days   Lab Units 10/24/19  0520   WBC 10*3/mm3 11.05*   HEMOGLOBIN g/dL 13.9   HEMATOCRIT % 41.8   PLATELETS 10*3/mm3 168     Results from last 7 days   Lab Units 10/24/19  0520   SODIUM mmol/L 136   POTASSIUM mmol/L 4.4   CHLORIDE mmol/L 102   CO2 mmol/L 23.0   BUN mg/dL 13   CREATININE mg/dL 0.82   CALCIUM mg/dL 9.4   GLUCOSE mg/dL 116*     I reviewed the patient's new imaging including images and reports.      Physical therapy: Pt amb 300' with RW CGA with VC's to improve heel strike and increase WB on R LE. Pt able to ascend/descend 10 steps with step to pattern with STC in L UE and R UE HHA CGA. Progressed patient's exercises and provided patient handout.     Discharge Assessment:    Vital Signs  Visit Vitals  /81   Pulse 81   Temp 97.2 °F (36.2 °C) (Oral)   Resp 18   SpO2 97%     Temp (24hrs), Av.7 °F (36.5 °C), Min:97.2 °F (36.2 °C), Max:97.9 °F (36.6 °C)      General Appearance:    Alert, cooperative, in no acute distress   Lungs:     Clear to auscultation,respirations regular, even and                   unlabored    Heart:    Regular rhythm and normal rate, normal S1 and S2   Abdomen:     Normal bowel sounds, no masses, no organomegaly, soft        non-tender, non-distended, no guarding, no rebound                 tenderness   Extremities:   Moves all extremities well, no edema, no cyanosis, no              Redness. Right knee ACE wrap CDI. Nerve block present   Pulses:   Pulses palpable and equal bilaterally   Skin:   No bleeding, bruising or rash   Neurologic:   Cranial nerves 2 - 12 grossly intact, sensation intact. Flexion and dorsiflexion intact bilateral feet.       Discharge Disposition: Home    Discharge Medications     Discharge Medications      New Medications      Instructions Start Date   aspirin  MG tablet   325 mg, Oral, Every 12 Hours Scheduled      oxyCODONE 5 MG immediate release  tablet  Commonly known as:  ROXICODONE   5 mg, Oral, Every 4 Hours PRN      STOOL SOFTENER 100 MG capsule  Generic drug:  docusate sodium   100 mg, Oral, 2 Times Daily PRN         Changes to Medications      Instructions Start Date   amLODIPine 10 MG tablet  Commonly known as:  NORVASC  What changed:  Another medication with the same name was removed. Continue taking this medication, and follow the directions you see here.   10 mg, Oral, Daily      rOPINIRole 0.5 MG tablet  Commonly known as:  REQUIP  What changed:    · how much to take  · how to take this  · when to take this  · additional instructions   Take 1 hour before bedtime. And 1 at lunch         Continue These Medications      Instructions Start Date   ALPRAZolam 0.25 MG tablet  Commonly known as:  XANAX   0.25 mg, Oral, 2 Times Daily PRN      clobetasol 0.05 % gel  Commonly known as:  TEMOVATE   1 application, Topical, Daily, ON GUMS      ketoconazole 2 % cream  Commonly known as:  NIZORAL   1 application, Topical, Nightly      sertraline 50 MG tablet  Commonly known as:  ZOLOFT   75 mg, Oral, Daily      telmisartan 80 MG tablet  Commonly known as:  MICARDIS   80 mg, Oral, Daily      terbinafine 250 MG tablet  Commonly known as:  lamiSIL   250 mg, Oral, Every 2 Months, TAKE 1 TABLET BY MOUTH X 7 DAYS EVERY OTHER MONTH       tiZANidine 4 MG tablet  Commonly known as:  ZANAFLEX   4 mg, Oral, Every 8 Hours PRN             Discharge Diet: Regular diet    Activity at Discharge: Protected WBAT RLE    Follow-up Appointments  Dr. Harkins per his orders      PAZ Russo  10/24/19  4:51 PM     I have personally performed the evaluation on this patient. My history is consistant  with above documentation . My exam finding are listed above. I have personally reviewed and discussed the above formulated discharge plan with patient and APRN.wy.

## 2019-10-24 NOTE — PROGRESS NOTES
[facility]    Acute pain service Inpatient Progress Note    Patient Name: Harvey Levine  :  1950  MRN:  [id number]        Acute Pain  Service Inpatient Progress Note:    Analgesia:Excellent  Pain Score:0/10  LOC: alert and awake  Resp Status: room air  Side Effects:None  Catheter Site:clean, dry and dressing intact  Cath type: peripheral nerve cath with ON Q  Infusion rate: 10ml/hr  Catheter Plan:Catheter to remain Insitu, Continue catheter infusion rate unchanged and Patient to be discharged home

## 2019-10-24 NOTE — PLAN OF CARE
Problem: Patient Care Overview  Goal: Plan of Care Review  Outcome: Ongoing (interventions implemented as appropriate)   10/24/19 0854   Coping/Psychosocial   Plan of Care Reviewed With spouse;patient   Plan of Care Review   Progress improving   OTHER   Outcome Summary Pt amb 300' with RW CGA with VC's to improve heel strike and increase WB on R LE. Pt able to ascend/descend 10 steps with step to pattern with STC in L UE and R UE HHA CGA. Progressed patient's exercises and provided patient handout.

## 2019-10-24 NOTE — PLAN OF CARE
Problem: Patient Care Overview  Goal: Plan of Care Review  Outcome: Outcome(s) achieved Date Met: 10/24/19   10/24/19 1101   Coping/Psychosocial   Plan of Care Reviewed With patient   OTHER   Outcome Summary Pt s/p TKA and now w/improved pain and overall symptoms. Pt however requires use of AE for sx LE d/t impaired flexibility. Pt has all needed DME for home use and will have family assist 24/7.

## 2019-10-24 NOTE — PLAN OF CARE
Problem: Patient Care Overview  Goal: Plan of Care Review  Outcome: Ongoing (interventions implemented as appropriate)   10/24/19 0611   Plan of Care Review   Progress improving   OTHER   Outcome Summary pt rested well throughout night, no c/o pain, no c/o n/vd, vss, will continue to monitor     Goal: Individualization and Mutuality  Outcome: Ongoing (interventions implemented as appropriate)    Goal: Discharge Needs Assessment  Outcome: Ongoing (interventions implemented as appropriate)    Goal: Interprofessional Rounds/Family Conf  Outcome: Ongoing (interventions implemented as appropriate)      Problem: Pain, Acute (Adult)  Goal: Acceptable Pain Control/Comfort Level  Outcome: Ongoing (interventions implemented as appropriate)

## 2019-10-24 NOTE — THERAPY DISCHARGE NOTE
Acute Care - Occupational Therapy Initial Eval/Discharge  [facility]     Patient Name: Harvey Levine  : 1950  MRN: [id number]  Today's Date: 10/24/2019     Date of Referral to OT: 10/24/19  Referring Physician: MD Shahana      Admit Date: 10/23/2019       ICD-10-CM ICD-9-CM   1. Status post total right knee replacement Z96.651 V43.65   2. Primary osteoarthritis of right knee M17.11 715.16   3. Impaired mobility and ADLs Z74.09 799.89     Patient Active Problem List   Diagnosis   • Erythrocytosis   • GERD   • Moderate obstructive sleep apnea   • Sleepiness   • Restless legs syndrome (RLS)   • Status post total right knee replacement   • HTN (hypertension)     Past Medical History:   Diagnosis Date   • Anxiety    • Arthritis    • Hearing loss     WEAR AIDS   • Hyperlipidemia    • Hypertension    • Liver failure, acute     RELATED TO STATIN USE; RESOLVED.   • MVP (mitral valve prolapse)    • Psoriasis    • RLS (restless legs syndrome)    • Sleep apnea     WILL BRING MASK, TUBING, AND SETTINGS   • Wears glasses      Past Surgical History:   Procedure Laterality Date   • COLONOSCOPY  SPRING 2019   • ENDOSCOPY  2017    Dr. Medrano   • HERNIA REPAIR     • LIPOMA EXCISION Right     SHOULDER   • LIVER BIOPSY  2012   • MOUTH BIOPSY  2011   • NEUROMA SURGERY Right     KNEE   • SKIN BIOPSY      Dr. Hétcor Bernal   • TOTAL KNEE ARTHROPLASTY Right 10/23/2019    Procedure: TOTAL KNEE ARTHROPLASTY RIGHT;  Surgeon: Luis Harkins MD;  Location: [facility];  Service: Orthopedics   • VASECTOMY  1992    [facility]          OT ASSESSMENT FLOWSHEET (last 12 hours)      Occupational Therapy Evaluation     Row Name 10/24/19 1101                   OT Evaluation Time/Intention    Subjective Information  no complaints  -ST        Document Type  evaluation;therapy note (daily note);discharge evaluation/summary  -ST        Mode of Treatment  individual therapy;occupational therapy  -ST        Patient  Effort  excellent  -ST        Symptoms Noted During/After Treatment  none  -ST           General Information    Patient Profile Reviewed?  yes  -ST        Referring Physician  MD Shahana  -ST        Patient Observations  alert;cooperative;agree to therapy  -ST        Patient/Family Observations  wife present  -ST        General Observations of Patient  supine w/AC nerve cath intact  -ST        Prior Level of Function  independent:;all household mobility;transfer;bed mobility;feeding;grooming;min assist:;community mobility;dressing;bathing;home management;cooking;cleaning inc. pain and difficulty with ADLs and mobility   -ST        Pertinent History of Current Functional Problem  Pt presents for sx management of long-standing R knee pain and dysfunction that failed to improve with conservative measures and impacted pt's ability to perform ADLs and mobility. Pt now POD 1 and s/p R TKA  -ST        Existing Precautions/Restrictions  fall;other (see comments) AC nerve cath   -ST        Equipment Issued to Patient  leg ;reacher  -ST        Risks Reviewed  patient and family:;LOB;nausea/vomiting;dizziness;increased discomfort;change in vital signs  -ST        Benefits Reviewed  patient and family:;increase independence;improve function;increase strength;increase balance;decrease pain;increase knowledge  -ST        Barriers to Rehab  previous functional deficit  -ST           Relationship/Environment    Primary Source of Support/Comfort  spouse  -ST        Lives With  spouse  -ST        Concerns About Impact on Relationships  wife 24/7  -ST           Resource/Environmental Concerns    Current Living Arrangements  home/apartment/condo  -ST           Cognitive Assessment/Interventions    Additional Documentation  Cognitive Assessment/Intervention (Group)  -ST           Cognitive Assessment/Intervention- PT/OT    Affect/Mental Status (Cognitive)  WNL  -ST        Orientation Status (Cognition)  oriented x 4  -ST         Follows Commands (Cognition)  WNL  -ST        Cognitive Function (Cognitive)  WNL  -ST           Safety Issues, Functional Mobility    Safety Issues Affecting Function (Mobility)  sequencing abilities  -ST        Impairments Affecting Function (Mobility)  range of motion (ROM);pain  -ST           Mobility Assessment/Treatment    Extremity Weight-bearing Status  right lower extremity  -ST        Right Lower Extremity (Weight-bearing Status)  weight-bearing as tolerated (WBAT)  -ST           Bed Mobility Assessment/Treatment    Bed Mobility Assessment/Treatment  supine-sit;sit-supine  -ST        Supine-Sit [place] (Bed Mobility)  conditional independence  -ST        Sit-Supine [place] (Bed Mobility)  conditional independence  -ST        Bed Mobility, Safety Issues  decreased use of legs for bridging/pushing  -ST        Assistive Device (Bed Mobility)  leg   -ST        Comment (Bed Mobility)  increased time and sequencing difficulty   -ST           Functional Mobility    Functional Mobility- Ind. Level  supervision required  -        Functional Mobility- Device  rolling walker  -        Functional Mobility-Distance (Feet)  7  -ST           Transfer Assessment/Treatment    Transfer Assessment/Treatment  sit-stand transfer;stand-sit transfer  -ST        Comment (Transfers)  vc'ing for hand placement   -ST           Sit-Stand Transfer    Sit-Stand [place] (Transfers)  supervision  -ST        Assistive Device (Sit-Stand Transfers)  walker, front-wheeled  -ST           Stand-Sit Transfer    Stand-Sit [place] (Transfers)  supervision  -ST        Assistive Device (Stand-Sit Transfers)  walker, front-wheeled  -ST           ADL Assessment/Intervention    68013 - OT Self Care/Mgmt Minutes  24  -ST        BADL Assessment/Intervention  bathing;upper body dressing;lower body dressing  -ST           Bathing Assessment/Intervention    Bathing [place] Level  lower body;bathing skills;set up;distal  lower extremities/feet  -ST        Assistive Devices (Bathing)  long-handled sponge  -ST        Bathing Position  edge of bed sitting  -ST        Comment (Bathing)  unable to fully reach distal sx LE   -ST           Upper Body Dressing Assessment/Training    Upper Body Dressing [place] Level  doff;don;pull-over garment;independent  -ST        Upper Body Dressing Position  edge of bed sitting  -ST           Lower Body Dressing Assessment/Training    Lower Body Dressing [place] Level  doff;don;pants/bottoms;shoes/slippers;socks;undergarment;set up  -ST        Assistive Devices (Lower Body Dressing)  reacher;sock-aid  -ST        Lower Body Dressing Position  edge of bed sitting;supported standing  -ST        Comment (Lower Body Dressing)  requires AE for sx LE d/t impaired ROM and flexibility   -ST           BADL Safety/Performance    Impairments, BADL Safety/Performance  pain;range of motion  -ST        Skilled BADL Treatment/Intervention  adaptive equipment training;compensatory training  -        Progress in BADL Status  improvement noted;independence level  -ST           General ROM    GENERAL ROM COMMENTS  BUE's WFL for ADLs   -ST           MMT (Manual Muscle Testing)    General MMT Comments  BUE's WFL for ADLs   -ST           Motor Assessment/Interventions    Additional Documentation  Balance (Group)  -ST           Balance    Balance  dynamic sitting balance;dynamic standing balance  -ST           Dynamic Sitting Balance    Level of [place], Reaches Outside Midline (Sitting, Dynamic Balance)  independent  -ST        Sitting Position, Reaches Outside Midline (Sitting, Dynamic Balance)  sitting on edge of bed  -ST           Dynamic Standing Balance    Level of [place], Reaches Outside Midline (Standing, Dynamic Balance)  supervision  -ST        Time Able to Maintain Position, Reaches Outside Midline (Standing, Dynamic Balance)  45 to 60 seconds  -ST        Assistive Device Utilized (Supported  Standing, Dynamic Balance)  walker, rolling  -ST        Comment, Resists Mild Perturbations (Sitting, Dynamic Balance)  good balance with pulling up pants, cuing for safety with nerve cath management   -ST           Sensory Assessment/Intervention    Sensory General Assessment  no sensation deficits identified  -ST           Positioning and Restraints    Pre-Treatment Position  in bed  -ST        Post Treatment Position  bed  -ST        In Bed  notified nsg;supine;call light within reach;encouraged to call for assist;with family/caregiver  -ST           Pain Scale: Numbers Pre/Post-Treatment    Pain Scale: Numbers, Pretreatment  1/10  -ST        Pain Scale: Numbers, Post-Treatment  1/10  -ST        Pain Location - Side  Right  -ST        Pain Location - Orientation  anterior  -ST        Pain Location  mouth (non-dental)  -ST        Pain Intervention(s)  Repositioned  -ST           Wound 10/23/19 1050 Right knee Incision    Wound - Properties Group Date first assessed: 10/23/19  -KR Time first assessed: 1050  -KR Side: Right  -KR Location: knee  -KR Primary Wound Type: Incision  -KR Additional Comments: right knee gauze and acewrap  -JR       Clinical Impression (OT)    Date of Referral to OT  10/24/19  -ST        OT Diagnosis  impaired ADLs  -ST        Prognosis (OT Eval)  good  -ST        Patient/Family Goals Statement (OT Eval)  return home  -ST        Therapy Frequency (OT Eval)  evaluation only  -ST        Care Plan Review (OT)  evaluation/treatment results reviewed;care plan/treatment goals reviewed;risks/benefits reviewed;current/potential barriers reviewed;patient/other agree to care plan  -ST        Care Plan Review, Other Participant (OT Eval)  family  -ST        Anticipated Discharge Disposition (OT)  home with 24/7 care;home with home health  -ST           Discharge Summary (Occupational Therapy)    Additional Documentation  Discharge Summary, OT Eval (Group)  -ST           Discharge Summary, OT Eval     Reason for Discharge (OT Discharge Summary)  patient discharged from this facility  -        Outcomes Achieved Upon Discharge (OT Discharge Summary)  discharge from facility occurred on same date as evaluation  -ST           Living Environment    Home Accessibility  -- walk-in shower  -ST          User Key  (r) = Recorded By, (t) = Taken By, (c) = Cosigned By    Initials Name Effective Dates     Marlen Day BENJI, OTR 06/10/18 -     Trice Stoner RN 06/16/16 -     Benita Gallego RN 07/17/19 -           Occupational Therapy Education     Title: PT OT SLP Therapies (Done)     Topic: Occupational Therapy (Done)     Point: ADL training (Done)     Description: Instruct learner(s) on proper safety adaptation and remediation techniques during self care or transfers.   Instruct in proper use of assistive devices.    Learning Progress Summary           Patient Acceptance, E,TB,D, VU by  at 10/24/2019 11:01 AM    Comment:  see flow sheet   Family Acceptance, E,TB,D, VU by ST at 10/24/2019 11:01 AM    Comment:  see flow sheet                   Point: Home exercise program (Done)     Description: Instruct learner(s) on appropriate technique for monitoring, assisting and/or progressing therapeutic exercises/activities.    Learning Progress Summary           Patient Acceptance, E,TB,D, VU by ST at 10/24/2019 11:01 AM    Comment:  see flow sheet   Family Acceptance, E,TB,D, VU by ST at 10/24/2019 11:01 AM    Comment:  see flow sheet                   Point: Precautions (Done)     Description: Instruct learner(s) on prescribed precautions during self-care and functional transfers.    Learning Progress Summary           Patient Acceptance, E,TB,D, VU by ST at 10/24/2019 11:01 AM    Comment:  see flow sheet   Family Acceptance, E,TB,D, VU by ST at 10/24/2019 11:01 AM    Comment:  see flow sheet                   Point: Body mechanics (Done)     Description: Instruct learner(s) on proper positioning and spine alignment  during self-care, functional mobility activities and/or exercises.    Learning Progress Summary           Patient Acceptance, E,TB,D, VU by  at 10/24/2019 11:01 AM    Comment:  see flow sheet   Family Acceptance, E,TB,D, VU by  at 10/24/2019 11:01 AM    Comment:  see flow sheet                               User Key     Initials Effective Dates Name Provider Type Discipline     06/10/18 -  Marlen Day OTSHEEBA Occupational Therapist OT                OT Recommendation and Plan  Outcome Summary/Treatment Plan (OT)  Anticipated Discharge Disposition (OT): home with 24/7 care, home with home health  Reason for Discharge (OT Discharge Summary): patient discharged from this facility  Therapy Frequency (OT Eval): evaluation only  Plan of Care Review  Plan of Care Reviewed With: patient  Plan of Care Reviewed With: patient  Outcome Summary: Pt s/p TKA and now w/improved pain and overall symptoms. Pt however requires use of AE for sx LE d/t impaired flexibility. Pt has all needed DME for home use and will have family assist 24/7.     Rehab Goal Summary     Row Name 10/24/19 0838             Bed Mobility Goal 1 (PT)    Activity/Assistive Device (Bed Mobility Goal 1, PT)  bed mobility activities, all  -CS      [place] Level/Cues Needed (Bed Mobility Goal 1, PT)  independent  -CS      Time Frame (Bed Mobility Goal 1, PT)  long term goal (LTG);3 days  -CS         Transfer Goal 1 (PT)    Activity/Assistive Device (Transfer Goal 1, PT)  sit-to-stand/stand-to-sit;walker, rolling  -CS      [place] Level/Cues Needed (Transfer Goal 1, PT)  conditional independence  -CS      Time Frame (Transfer Goal 1, PT)  long term goal (LTG);3 days  -CS         Gait Training Goal 1 (PT)    Activity/Assistive Device (Gait Training Goal 1, PT)  gait (walking locomotion);walker, rolling;assistive device use  -CS      [place] Level (Gait Training Goal 1, PT)  conditional independence  -CS      Distance (Gait Goal 1, PT)  500   -CS      Time Frame (Gait Training Goal 1, PT)  3 days  -CS         ROM Goal 1 (PT)    ROM Goal 1 (PT)  0-90 RLE knee ROM  -CS      Time Frame (ROM Goal 1, PT)  long term goal (LTG);3 days  -CS         Stairs Goal 1 (PT)    Activity/Assistive Device (Stairs Goal 1, PT)  stairs, all skills;cane, straight  -CS      [place] Level/Cues Needed (Stairs Goal 1, PT)  minimum assist (75% or more patient effort)  -CS      Number of Stairs (Stairs Goal 1, PT)  5  -CS      Time Frame (Stairs Goal 1, PT)  long term goal (LTG);3 days  -CS        User Key  (r) = Recorded By, (t) = Taken By, (c) = Cosigned By    Initials Name Provider Type Discipline    Sujata Stringer, PT Physical Therapist PT          Outcome Measures     Row Name 10/24/19 1101             How much help from another is currently needed...    Putting on and taking off regular lower body clothing?  3  -ST      Bathing (including washing, rinsing, and drying)  3  -ST      Toileting (which includes using toilet bed pan or urinal)  4  -ST      Putting on and taking off regular upper body clothing  4  -ST      Taking care of personal grooming (such as brushing teeth)  4  -ST      Eating meals  4  -ST      AM-PAC 6 Clicks Score (OT)  22  -ST         Functional Assessment    Outcome Measure Options  AM-PAC 6 Clicks Daily Activity (OT)  -ST        User Key  (r) = Recorded By, (t) = Taken By, (c) = Cosigned By    Initials Name Provider Type    Marlen Alejandre OTR Occupational Therapist          Time Calculation:   Time Calculation- OT     Row Name 10/24/19 1101 10/24/19 0838          Time Calculation- OT    OT Start Time  1101  -ST  --     OT Received On  10/24/19  -ST  --        Timed Charges    59988 - Gait Training Minutes   --  20 -CS     81682 - OT Self Care/Mgmt Minutes  24  -ST  --       User Key  (r) = Recorded By, (t) = Taken By, (c) = Cosigned By    Initials Name Provider Type    Marlen Alejandre OTR Occupational Therapist    LLUVIA Marsh  Sujata PT Physical Therapist        Therapy Suggested Charges     Code   Minutes Charges    72505 (CPT®) Hc Ot Neuromusc Re Education Ea 15 Min      54291 (CPT®) Hc Ot Ther Proc Ea 15 Min      43640 (CPT®) Hc Ot Therapeutic Act Ea 15 Min      65914 (CPT®) Hc Ot Manual Therapy Ea 15 Min      31746 (CPT®) Hc Ot Iontophoresis Ea 15 Min      17998 (CPT®) Hc Ot Elec Stim Ea-Per 15 Min      05800 (CPT®) Hc Ot Ultrasound Ea 15 Min      21798 (CPT®) Hc Ot Self Care/Mgmt/Train Ea 15 Min 24 2    Total  24 2        Therapy Charges for Today     Code Description Service Date Service Provider Modifiers Qty    23077815257 HC OT SELF CARE/MGMT/TRAIN EA 15 MIN 10/24/2019 Marlen Day, OTR GO 2    [id number] HC OT EVAL LOW COMPLEXITY 3 10/24/2019 Marlen Day OTSHEEBA GO 1               OT Discharge Summary  Anticipated Discharge Disposition (OT): home with 24/7 care, home with home health  Reason for Discharge: Discharge from facility  Outcomes Achieved: Discharge from facility occurred on same date as evluation  Discharge Destination: Home with assist, Home with home health    RIK Real  10/24/2019

## 2019-10-24 NOTE — PROGRESS NOTES
Acute Pain Service:  Peripheral nerve catheter and disposable infusion device teaching completed with patient and family member. Discussion, handout and bracelet provided with [facility] on call central phone number.  Instructed to call with any questions or concerns.  Patient verbalized understanding.  Service will continue to follow until catheter DC'd.  Please contact patient at [phone] if needed.

## 2019-10-24 NOTE — PLAN OF CARE
Problem: Patient Care Overview  Goal: Plan of Care Review  Outcome: Outcome(s) achieved Date Met: 10/24/19   10/24/19 3730   Coping/Psychosocial   Plan of Care Reviewed With patient   Plan of Care Review   Progress improving   OTHER   Outcome Summary Patient being discharged home today     Goal: Individualization and Mutuality  Outcome: Outcome(s) achieved Date Met: 10/24/19    Goal: Discharge Needs Assessment  Outcome: Outcome(s) achieved Date Met: 10/24/19    Goal: Interprofessional Rounds/Family Conf  Outcome: Outcome(s) achieved Date Met: 10/24/19      Problem: Pain, Acute (Adult)  Goal: Identify Related Risk Factors and Signs and Symptoms  Outcome: Outcome(s) achieved Date Met: 10/24/19    Goal: Acceptable Pain Control/Comfort Level  Outcome: Outcome(s) achieved Date Met: 10/24/19

## 2019-10-24 NOTE — PROGRESS NOTES
SUBJECTIVE  Patient resting comfortably.  Pain well controlled.  Ambulated 350 feet with therapy yesterday.  No events overnight.    OBJECTIVE  Temp (24hrs), Av.6 °F (36.4 °C), Min:96.7 °F (35.9 °C), Max:98 °F (36.7 °C)    Blood pressure 104/60, pulse 56, temperature 97.8 °F (36.6 °C), temperature source Oral, resp. rate 18, SpO2 97 %.    Lab Results (last 24 hours)     Procedure Component Value Units Date/Time    POC Glucose Once [[id number]]  (Normal) Collected:  10/23/19 0835    Specimen:  Blood Updated:  10/23/19 0837     Glucose 87 mg/dL             PHYSICAL EXAM  Right lower extremity: Dressing clean, dry and intact.  Able to perform straight leg raise without assist.  Intact EHL, FHL, tibialis anterior, and gastrocsoleus. Sensation intact to light touch to deep peroneal, superficial peroneal, sural, saphenous, tibial nerves. 2+ palpable DP and PT pulses.         Status post total right knee replacement    Moderate obstructive sleep apnea    Restless legs syndrome (RLS)    HTN (hypertension)      PLAN / DISPOSITION:  1 Day Post-Op right total knee arthroplasty    Protected weight bearing as tolerated right lower extremity, knee range of motion as tolerated  Pain control  PT/OT for post op mobilization and medical equipment needs   23 hours perioperative antibiotic prophylaxis   SCD's bilateral lower extremities   Aspirin for DVT prophylaxis   Social work for discharge planning.  Anticipate discharge home today.  Dressing to remain in place for 7 days. May remove on POD#7. If no drainage, may shower on POD#10. No submerging wound in water. If drainage is noted, sterile dressing should be placed and wound checked daily. No showering until wound has remained dry for 72 consecutive hours.   Follow up in 3 weeks for re-assessment.      Luis Harkins MD  10/24/19  6:37 AM

## 2019-10-24 NOTE — THERAPY TREATMENT NOTE
Patient Name: Harvey Levine  : 1950    MRN: [id number]                              Today's Date: 10/24/2019       Admit Date: 10/23/2019    Visit Dx:     ICD-10-CM ICD-9-CM   1. Status post total right knee replacement Z96.651 V43.65   2. Primary osteoarthritis of right knee M17.11 715.16     Patient Active Problem List   Diagnosis   • Erythrocytosis   • GERD   • Moderate obstructive sleep apnea   • Sleepiness   • Restless legs syndrome (RLS)   • Status post total right knee replacement   • HTN (hypertension)     Past Medical History:   Diagnosis Date   • Anxiety    • Arthritis    • Hearing loss     WEAR AIDS   • Hyperlipidemia    • Hypertension    • Liver failure, acute     RELATED TO STATIN USE; RESOLVED.   • MVP (mitral valve prolapse)    • Psoriasis    • RLS (restless legs syndrome)    • Sleep apnea     WILL BRING MASK, TUBING, AND SETTINGS   • Wears glasses      Past Surgical History:   Procedure Laterality Date   • COLONOSCOPY  SPRING 2019   • ENDOSCOPY  2017    Dr. Medrano   • HERNIA REPAIR     • LIPOMA EXCISION Right     SHOULDER   • LIVER BIOPSY  2012   • MOUTH BIOPSY  2011   • NEUROMA SURGERY Right     KNEE   • SKIN BIOPSY      Dr. Héctor Bernal   • TOTAL KNEE ARTHROPLASTY Right 10/23/2019    Procedure: TOTAL KNEE ARTHROPLASTY RIGHT;  Surgeon: Luis Harkins MD;  Location: [facility];  Service: Orthopedics   • VASECTOMY  1992     Urology     General Information     Row Name 10/24/19 0838          PT Evaluation Time/Intention    Document Type  therapy note (daily note)  -CS     Mode of Treatment  individual therapy;physical therapy  -CS     Row Name 10/24/19 0853          General Information    Patient Profile Reviewed?  yes  -CS     Row Name 10/24/19 0838          Cognitive Assessment/Intervention- PT/OT    Orientation Status (Cognition)  oriented x 4  -CS     Row Name 10/24/19 0879          Safety Issues, Functional Mobility    Safety Issues Affecting Function  (Mobility)  safety precautions follow-through/compliance;safety precaution awareness  -CS     Impairments Affecting Function (Mobility)  balance;endurance/activity tolerance;motor control;pain;range of motion (ROM);strength  -CS       User Key  (r) = Recorded By, (t) = Taken By, (c) = Cosigned By    Initials Name Provider Type    CS Sujata Marsh PT Physical Therapist        Mobility     Row Name 10/24/19 0838          Bed Mobility Assessment/Treatment    Bed Mobility Assessment/Treatment  supine-sit  -CS     Supine-Sit [place] (Bed Mobility)  supervision  -     Assistive Device (Bed Mobility)  head of bed elevated  -CS     Row Name 10/24/19 0838          Transfer Assessment/Treatment    Comment (Transfers)  VC for pushing off of bed with standing and reaching back for sitting.   -CS     Row Name 10/24/19 0838          Bed-Chair Transfer    Bed-Chair [place] (Transfers)  stand by assist  -CS     Assistive Device (Bed-Chair Transfers)  walker, front-wheeled  -CS     Row Name 10/24/19 0838          Sit-Stand Transfer    Sit-Stand [place] (Transfers)  stand by assist  -CS     Assistive Device (Sit-Stand Transfers)  walker, front-wheeled  -CS     Row Name 10/24/19 0838          Gait/Stairs Assessment/Training    19038 - Gait Training Minutes   20  -     Gait/Stairs Assessment/Training  gait/ambulation independence;gait/ambulation assistive device;distance ambulated;gait pattern;gait deviations  -     [place] Level (Gait)  contact guard  -     Assistive Device (Gait)  walker, front-wheeled  -     Distance in Feet (Gait)  300'  -     Pattern (Gait)  step-through  -     Right Sided Gait Deviations  heel strike decreased;weight shift ability decreased  -     Negotiation (Stairs)  stairs independence;stairs assistive device;handrail location;number of steps;ascending technique;descending technique  -     [place] Level (Stairs)  contact guard  -CS     Assistive Device (Stairs)   cane, straight  -CS     Handrail Location (Stairs)  none R UE HHA  -CS     Number of Steps (Stairs)  10  -CS     Ascending Technique (Stairs)  step-to-step  -CS     Descending Technique (Stairs)  step-to-step  -CS     Stairs, Impairments  strength decreased  -CS     Comment (Gait/Stairs)  Pt amb 300' with RW CGA with VC's to increase heel strike and toe off and increase WB on R LE. Pt able to acsend/descend 10 steps with STC in L UE and R UE HHA CGA with VC's on sequencing with steps.   -CS     Row Name 10/24/19 0838          Mobility Assessment/Intervention    Extremity Weight-bearing Status  right lower extremity  -CS     Right Lower Extremity (Weight-bearing Status)  weight-bearing as tolerated (WBAT)  -CS       User Key  (r) = Recorded By, (t) = Taken By, (c) = Cosigned By    Initials Name Provider Type    Sujata Stringer PT Physical Therapist        Obj/Interventions     Row Name 10/24/19 0838          General ROM    GENERAL ROM COMMENTS  -2-90 degrees. Lacking 2 degrees from extension and has 90 degrees of knee flexion.   -CS     Row Name 10/24/19 0838          Therapeutic Exercise    Lower Extremity (Therapeutic Exercise)  gluteal sets;quad sets, bilateral;heel slides, right;LAQ (long arc quad), right;SLR (straight leg raise), right;SAQ (short arc quad), right  -CS     Lower Extremity Range of Motion (Therapeutic Exercise)  ankle dorsiflexion/plantar flexion, bilateral  -CS     Exercise Type (Therapeutic Exercise)  AAROM (active assistive range of motion);isometric contraction, static;isotonic contraction, concentric;AROM (active range of motion)  -CS     Sets/Reps (Therapeutic Exercise)  15 reps each   -CS     Comment (Therapeutic Exercise)  VC's on technique   -CS       User Key  (r) = Recorded By, (t) = Taken By, (c) = Cosigned By    Initials Name Provider Type    Sujata Stringer PT Physical Therapist        Goals/Plan     Row Name 10/24/19 0838          Bed Mobility Goal 1 (PT)     Activity/Assistive Device (Bed Mobility Goal 1, PT)  bed mobility activities, all  -CS     [place] Level/Cues Needed (Bed Mobility Goal 1, PT)  independent  -CS     Time Frame (Bed Mobility Goal 1, PT)  long term goal (LTG);3 days  -CS     Row Name 10/24/19 0838          Transfer Goal 1 (PT)    Activity/Assistive Device (Transfer Goal 1, PT)  sit-to-stand/stand-to-sit;walker, rolling  -CS     [place] Level/Cues Needed (Transfer Goal 1, PT)  conditional independence  -CS     Time Frame (Transfer Goal 1, PT)  long term goal (LTG);3 days  -CS     Row Name 10/24/19 0838          Gait Training Goal 1 (PT)    Activity/Assistive Device (Gait Training Goal 1, PT)  gait (walking locomotion);walker, rolling;assistive device use  -CS     [place] Level (Gait Training Goal 1, PT)  conditional independence  -CS     Distance (Gait Goal 1, PT)  500  -CS     Time Frame (Gait Training Goal 1, PT)  3 days  -CS     Row Name 10/24/19 0838          ROM Goal 1 (PT)    ROM Goal 1 (PT)  0-90 RLE knee ROM  -CS     Time Frame (ROM Goal 1, PT)  long term goal (LTG);3 days  -CS     Row Name 10/24/19 0838          Stairs Goal 1 (PT)    Activity/Assistive Device (Stairs Goal 1, PT)  stairs, all skills;cane, straight  -CS     [place] Level/Cues Needed (Stairs Goal 1, PT)  minimum assist (75% or more patient effort)  -CS     Number of Stairs (Stairs Goal 1, PT)  5  -CS     Time Frame (Stairs Goal 1, PT)  long term goal (LTG);3 days  -CS       User Key  (r) = Recorded By, (t) = Taken By, (c) = Cosigned By    Initials Name Provider Type    CS Sujata Marsh, PT Physical Therapist        Clinical Impression     Row Name 10/24/19 0838          Pain Assessment    Additional Documentation  Pain Scale: Numbers Pre/Post-Treatment (Group)  -CS     Row Name 10/24/19 0838          Pain Scale: Numbers Pre/Post-Treatment    Pain Scale: Numbers, Pretreatment  3/10  -CS     Pain Scale: Numbers, Post-Treatment  3/10  -CS     Pain Location  - Side  Right  -CS     Pain Location - Orientation  posterior  -CS     Pain Location  knee  -CS     Pain Intervention(s)  Ambulation/increased activity;Repositioned  -CS     Row Name 10/24/19 0838          Physical Therapy Clinical Impression    Criteria for Skilled Interventions Met (PT Clinical Impression)  treatment indicated  -CS     Rehab Potential (PT Clinical Summary)  good, to achieve stated therapy goals  -CS     Row Name 10/24/19 0838          Positioning and Restraints    Pre-Treatment Position  in bed  -CS     Post Treatment Position  chair  -CS     In Chair  reclined;call light within reach;encouraged to call for assist;exit alarm on;with family/caregiver  -CS       User Key  (r) = Recorded By, (t) = Taken By, (c) = Cosigned By    Initials Name Provider Type    Sujata Stringer PT Physical Therapist        Outcome Measures     Row Name 10/24/19 0838          How much help from another person do you currently need...    Turning from your back to your side while in flat bed without using bedrails?  4  -CS     Moving from lying on back to sitting on the side of a flat bed without bedrails?  4  -CS     Moving to and from a bed to a chair (including a wheelchair)?  3  -CS     Standing up from a chair using your arms (e.g., wheelchair, bedside chair)?  3  -CS     Climbing 3-5 steps with a railing?  3  -CS     To walk in hospital room?  3  -CS     AM-PAC 6 Clicks Score (PT)  20  -CS     Row Name 10/24/19 0838          Functional Assessment    Outcome Measure Options  AM-PAC 6 Clicks Basic Mobility (PT)  -CS       User Key  (r) = Recorded By, (t) = Taken By, (c) = Cosigned By    Initials Name Provider Type    Sujata Stringer, ALLIE Physical Therapist        Physical Therapy Education     Title: PT OT SLP Therapies (Done)     Topic: Physical Therapy (Done)     Point: Mobility training (Done)     Learning Progress Summary           Patient Acceptance, E,MILAGRO,H, BRITTANY,DU by LLUVIA at 10/24/2019  8:38 AM     Comment:  Educated patient on progression of HEP and provided patient handout of exercises. Educated patient on technique to ascend and descend 10 steps with STC and HHA.    Acceptance, E, VU,NR by GEMMA at 10/23/2019  5:43 PM   Significant Other Acceptance, E, VU,NR by GEMMA at 10/23/2019  5:43 PM                   Point: Home exercise program (Done)     Learning Progress Summary           Patient Acceptance, E,D,H, VU,DU by  at 10/24/2019  8:38 AM    Comment:  Educated patient on progression of HEP and provided patient handout of exercises. Educated patient on technique to ascend and descend 10 steps with STC and HHA.                   Point: Body mechanics (Done)     Learning Progress Summary           Patient Acceptance, E,D,H, VU,DU by  at 10/24/2019  8:38 AM    Comment:  Educated patient on progression of HEP and provided patient handout of exercises. Educated patient on technique to ascend and descend 10 steps with STC and HHA.    Acceptance, E, VU,NR by GEMMA at 10/23/2019  5:43 PM   Significant Other Acceptance, E, VU,NR by GEMMA at 10/23/2019  5:43 PM                   Point: Precautions (Done)     Learning Progress Summary           Patient Acceptance, E,D,H, VU,DU by  at 10/24/2019  8:38 AM    Comment:  Educated patient on progression of HEP and provided patient handout of exercises. Educated patient on technique to ascend and descend 10 steps with STC and HHA.    Acceptance, E, VU,NR by GEMMA at 10/23/2019  5:43 PM   Significant Other Acceptance, E, VU,NR by GEMMA at 10/23/2019  5:43 PM                               User Key     Initials Effective Dates Name Provider Type Discipline     11/20/18 -  Mckenzie Riddle, PT Physical Therapist PT     03/26/19 -  Sujata Marsh, PT Physical Therapist PT              PT Recommendation and Plan  Planned Therapy Interventions (PT Eval): balance training, bed mobility training, gait training, home exercise program, neuromuscular re-education, patient/family  education, ROM (range of motion), stair training, strengthening, transfer training  Outcome Summary/Treatment Plan (PT)  Anticipated Discharge Disposition (PT): home with assist, home with home health  Plan of Care Reviewed With: spouse, patient  Progress: improving  Outcome Summary: Pt amb 300' with RW CGA with VC's to improve heel strike and increase WB on R LE. Pt able to ascend/descend 10 steps with step to pattern with STC in L UE and R UE HHA CGA. Progressed patient's exercises and provided patient handout.      Time Calculation:   PT Charges     Row Name 10/24/19 0838             Time Calculation    Start Time  0838  -CS      PT Received On  10/24/19  -CS         Time Calculation- PT    Total Timed Code Minutes- PT  32 minute(s)  -CS         Timed Charges    59658 - PT Therapeutic Exercise Minutes  12  -CS      35105 - Gait Training Minutes   20  -CS        User Key  (r) = Recorded By, (t) = Taken By, (c) = Cosigned By    Initials Name Provider Type    CS Sujata Marsh, PT Physical Therapist        Therapy Charges for Today     Code Description Service Date Service Provider Modifiers Qty    84756882729 HC PT THER PROC EA 15 MIN 10/24/2019 Sujata Marsh, PT GP 1    [id number] HC GAIT TRAINING EA 15 MIN 10/24/2019 Sujata Marsh, PT GP 1          PT G-Codes  Outcome Measure Options: AM-PAC 6 Clicks Basic Mobility (PT)  AM-PAC 6 Clicks Score (PT): 20    Sujata Marsh PT  10/24/2019

## 2019-10-25 ENCOUNTER — TELEPHONE (OUTPATIENT)
Dept: ORTHOPEDIC SURGERY | Facility: CLINIC | Age: 69
End: 2019-10-25

## 2019-10-25 RX ORDER — ONDANSETRON 4 MG/1
4 TABLET, FILM COATED ORAL EVERY 8 HOURS PRN
Qty: 10 TABLET | Refills: 1 | Status: SHIPPED | OUTPATIENT
Start: 2019-10-25 | End: 2022-09-30

## 2019-10-25 NOTE — TELEPHONE ENCOUNTER
I will prescribe him Zofran.  He can double up on the oxycodone.  I would not recommend an alternative narcotic.  He is also able to take an anti-inflammatory and ice the knee.

## 2019-10-25 NOTE — TELEPHONE ENCOUNTER
Patient would like to know if there is something he can take for pain in between taken the prescription pain meds. Please call patient at [phone]. Thanks

## 2019-10-25 NOTE — TELEPHONE ENCOUNTER
PATIENT STATES HE DID TOO MUCH WALKING AFTER SURGERY. HE IS REQUESTING A DIFFERENT TYPE OF PAIN MEDICATION AND SOMETHING FOR NAUSEA.

## 2019-10-26 NOTE — PROGRESS NOTES
[facility]    Nerve Cath Post Op Call    Patient Name: Harvey Levine  :  1950  MRN:  [id number]  Date of Discharge: 10/24/2019    Nerve Cath Post Op Call:    Catheter Plan:Patient/Family member report nerve catheter previously discontinued, tip intact

## 2019-10-28 ENCOUNTER — TELEPHONE (OUTPATIENT)
Dept: ORTHOPEDIC SURGERY | Facility: CLINIC | Age: 69
End: 2019-10-28

## 2019-10-28 RX ORDER — OXYCODONE HYDROCHLORIDE 5 MG/1
5 TABLET ORAL EVERY 4 HOURS PRN
Qty: 40 TABLET | Refills: 0 | Status: SHIPPED | OUTPATIENT
Start: 2019-10-28 | End: 2019-11-04 | Stop reason: SDUPTHER

## 2019-10-28 NOTE — TELEPHONE ENCOUNTER
I spoke with the patient's wife and asked if they are wanting a refill on the pain medication. She mentioned that they would like a refill to help with his pain. He is currently on the Oxycodone. Please advise.    Abigail HARRIS

## 2019-10-28 NOTE — TELEPHONE ENCOUNTER
PATIENT'S WIFE CALLED ABOUT HER 'S PAIN LEVEL. HE WAS ADVISED ABOUT TAKING THE PAIN MEDICATION THAT WAS OKAYED PER OUR OFFICE. SHE WAS CALLING TO SEE IF THEY CAN GET A REFILL OF THE PAIN MEDICATION. SHE WOULD LIKE TO BE NOTIFIED [phone]. THE PHARMACY IN THE CHART IS CONFIRMED AND THEY WOULD LIKE IT SENT THERE.

## 2019-10-28 NOTE — TELEPHONE ENCOUNTER
Left a voicemail on the patient's wifes phone to advise that the medication has been sent to the pharmacy for a refill.     Abigail HARRIS

## 2019-11-04 ENCOUNTER — TELEPHONE (OUTPATIENT)
Dept: ORTHOPEDIC SURGERY | Facility: CLINIC | Age: 69
End: 2019-11-04

## 2019-11-04 RX ORDER — OXYCODONE HYDROCHLORIDE 5 MG/1
5 TABLET ORAL EVERY 4 HOURS PRN
Qty: 40 TABLET | Refills: 0 | Status: SHIPPED | OUTPATIENT
Start: 2019-11-04 | End: 2019-11-13

## 2019-11-04 NOTE — TELEPHONE ENCOUNTER
JOSIE FROM [facility] CALLED, SAID THEY AREN'T GETTING ANYWHERE WITH HIS KNEES, WANTED TO KNOW IF HE COULD HAVE MORE PAIN MEDS SENT OVER. ALSO, HOW DOES DR. MCGARRY FEEL ABOUT USING A COMPRESSION HOSE? CALL BACK #[phone]

## 2019-11-04 NOTE — TELEPHONE ENCOUNTER
Dr. Singer,    I called and spoke with Airam, she explains that his ROM really hasn't improved since his 2nd visit. He still has quite a bit of swelling and pitting edema in the leg. She said he isn't scheduled for another visit until next Tuesday but will see if they can do this Friday and she will update us on how he is that day. I called the patient and explained that he needed to continue to ice and elevate the knee as well as get a compression stocking. He understood and was sending his wife to get some today.  Jeannine

## 2019-11-04 NOTE — TELEPHONE ENCOUNTER
"Refill sent to pharmacy.  Please clarify exactly what it means \"not getting anywhere\".  Is he having difficulty with range of motion?  I am okay with him using compression socks.  It sounds to me like he needs to rest and ice his knee as well as take the pain medication prior to PT.  If there is concern with him not regaining range of motion by the end of this week, he needs to get in and see a PA ASAP.  "

## 2019-11-15 ENCOUNTER — OFFICE VISIT (OUTPATIENT)
Dept: ORTHOPEDIC SURGERY | Facility: CLINIC | Age: 69
End: 2019-11-15

## 2019-11-15 DIAGNOSIS — Z96.651 S/P TOTAL KNEE REPLACEMENT USING CEMENT, RIGHT: ICD-10-CM

## 2019-11-15 DIAGNOSIS — Z09 SURGERY FOLLOW-UP: Primary | ICD-10-CM

## 2019-11-15 PROCEDURE — 99024 POSTOP FOLLOW-UP VISIT: CPT | Performed by: PHYSICIAN ASSISTANT

## 2019-11-15 RX ORDER — OXYCODONE HYDROCHLORIDE 5 MG/1
5 TABLET ORAL EVERY 4 HOURS PRN
Qty: 30 TABLET | Refills: 0 | Status: SHIPPED | OUTPATIENT
Start: 2019-11-15 | End: 2022-09-30

## 2019-11-15 NOTE — PROGRESS NOTES
[facility] Note    Subjective     Patient: Harvey Levine  : 1950    Primary Care Provider: Antonia Conner MD    Requesting Provider: As above    Post-op (3 week S/P Right total knee arthroplasty 10/23/19)      History    History of Present Illness: Patient presents with 3 weeks status post right total knee arthroplasty with Dr. Harkins on 10/23/2019.  He is doing outpatient physical therapy.  He reports pain is slowly improving.  He has pain 3/10.  He is taking half of an oxycodone for physical therapy.  He is using a cane for ambulation.    Current Outpatient Medications on File Prior to Visit   Medication Sig Dispense Refill   • ALPRAZolam (XANAX) 0.25 MG tablet Take 0.25 mg by mouth 2 (Two) Times a Day As Needed (RESTLESS LEG).     • amLODIPine (NORVASC) 10 MG tablet Take 10 mg by mouth Daily.     • aspirin 325 MG EC tablet Take 1 tablet by mouth Every 12 (Twelve) Hours for 1 month 60 tablet 0   • clobetasol (TEMOVATE) 0.05 % gel Apply 1 application topically to the appropriate area as directed Daily. ON GUMS     • docusate sodium (COLACE) 100 MG capsule Take 1 capsule by mouth 2 (Two) Times a Day As Needed for Constipation. 60 capsule 0   • ketoconazole (NIZORAL) 2 % cream Apply 1 application topically to the appropriate area as directed Every Night.     • ondansetron (ZOFRAN) 4 MG tablet Take 1 tablet by mouth Every 8 (Eight) Hours As Needed for Nausea or Vomiting. 10 tablet 1   • rOPINIRole (REQUIP) 0.5 MG tablet Take 1 hour before bedtime. And 1 at lunch (Patient taking differently: Take 0.5 mg by mouth 2 (Two) Times a Day. Take 1 hour before bedtime. And 1 at lunch) 180 tablet 3   • sertraline (ZOLOFT) 50 MG tablet Take 75 mg by mouth Daily.     • telmisartan (MICARDIS) 80 MG tablet Take 80 mg by mouth Daily.     • terbinafine (lamiSIL) 250 MG tablet Take 250 mg by mouth Every 2 (Two) Months. TAKE 1 TABLET BY MOUTH X 7 DAYS EVERY OTHER MONTH     • tiZANidine  (ZANAFLEX) 4 MG tablet Take 4 mg by mouth Every 8 (Eight) Hours As Needed.       No current facility-administered medications on file prior to visit.       Allergies   Allergen Reactions   • Crestor [Rosuvastatin Calcium] Other (See Comments) and Myalgia     Liver failure   • Lipitor [Atorvastatin] Other (See Comments) and Myalgia     Liver failure   • Lisinopril Other (See Comments)     Liver failure   • Zetia [Ezetimibe] Other (See Comments)     Liver failure   • Adhesive Tape Other (See Comments)     Tearing of the skin      Past Medical History:   Diagnosis Date   • Anxiety    • Arthritis    • Hearing loss     WEAR AIDS   • Hyperlipidemia    • Hypertension    • Liver failure, acute     RELATED TO STATIN USE; RESOLVED.   • MVP (mitral valve prolapse)    • Psoriasis    • RLS (restless legs syndrome)    • Sleep apnea     WILL BRING MASK, TUBING, AND SETTINGS   • Wears glasses      Past Surgical History:   Procedure Laterality Date   • COLONOSCOPY  SPRING 2019   • ENDOSCOPY  09/29/2017    Dr. Medrano   • HERNIA REPAIR     • LIPOMA EXCISION Right     SHOULDER   • LIVER BIOPSY  02/20/2012   • MOUTH BIOPSY  05/25/2011   • NEUROMA SURGERY Right     KNEE   • SKIN BIOPSY  2018    Dr. Héctor Bernal   • TOTAL KNEE ARTHROPLASTY Right 10/23/2019    Procedure: TOTAL KNEE ARTHROPLASTY RIGHT;  Surgeon: Luis Harkins MD;  Location: [facility];  Service: Orthopedics   • VASECTOMY  01/01/1992     Urology     Family History   Problem Relation Age of Onset   • Breast cancer Mother    • Pancreatic cancer Mother    • Stomach cancer Maternal Grandmother    • Heart failure Father       Social History     Socioeconomic History   • Marital status:      Spouse name: Not on file   • Number of children: Not on file   • Years of education: Not on file   • Highest education level: Not on file   Tobacco Use   • Smoking status: Never Smoker   • Smokeless tobacco: Never Used   Substance and Sexual Activity   • Alcohol use: Yes      Alcohol/week: 8.4 oz     Types: 14 Glasses of wine per week   • Drug use: No   • Sexual activity: Defer   Social History Narrative        Retired from IT    Second hand smoke exposure at work and as a child.    Drinks about 10 alcoholic drinks a week.         Review of Systems   Constitutional: Negative.    HENT: Negative.    Eyes: Negative.    Respiratory: Negative.    Cardiovascular: Negative.    Gastrointestinal: Negative.    Endocrine: Negative.    Genitourinary: Negative.    Musculoskeletal: Positive for arthralgias and joint swelling.   Skin: Negative.    Allergic/Immunologic: Negative.    Neurological: Negative.    Hematological: Negative.    Psychiatric/Behavioral: Negative.        The following portions of the patient's history were reviewed and updated as appropriate: allergies, current medications, past family history, past medical history, past social history, past surgical history and problem list.      Objective      Physical Exam  There were no vitals taken for this visit.    There is no height or weight on file to calculate BMI.    Ortho Exam  Right knee exam:  Anterior knee incision is healing well  Range of motion 3-90  Ligaments stable to valgus and varus stress  Neurovascular intact distally  Ambulating with a cane    Medical Decision Making    Data Review:   ordered and reviewed x-rays today    Assessment:  1. Surgery follow-up    2. S/P total knee replacement using cement, right        Plan:  Doing well status post right total knee arthroplasty with Dr. Alicea on 10/23/2019.  X-rays today show well-positioned, cemented total knee arthroplasty with no evidence of osteolysis, subsidence or fracture.  Patient will continue with his outpatient physical therapy.  I encouraged him to work hard on his motion.  We will refill his narcotics.  I have also given him a prescription for Voltaren gel was at his request but told him to keep it away from the incision.  He will return to see Dr. Willis  in 3 weeks with repeat x-rays or sooner if needed.      Sabiha Franz PA-C  11/15/19  12:01 PM

## 2019-12-02 ENCOUNTER — TELEPHONE (OUTPATIENT)
Dept: ORTHOPEDIC SURGERY | Facility: CLINIC | Age: 69
End: 2019-12-02

## 2019-12-02 NOTE — TELEPHONE ENCOUNTER
The patient mentioned that this weekend he usually uses a bike for exercise and now he is having some pain down the side of his knee cap. He mentioned that he is having some swelling below the knee cap now. He mentioned that he is slightly red but there is no drainage, he is not experiencing any fever, chills or night sweats. He mentioned that the knee is now very tender and is a little concerned. I asked if he had bumped his knee against anything and he does not remember an incident of such. I advised that he should take it easy and rest and ice the knee as much as he can to help with the pain and swelling. Also the patient would like a refill on pain medication to help with the pain due to starting PT.     Please advise.    Abigail

## 2019-12-02 NOTE — TELEPHONE ENCOUNTER
I spoke with the patient and advised per Dr. Harkins, he wants to see him in the office tomorrow to check on the bulge that the patient mentioned earlier. He understood and is scheduled for 1:40 with TAR.    Abigail

## 2019-12-02 NOTE — TELEPHONE ENCOUNTER
STARTED USING EXERCISES FROM PHYSICAL THERAPY, CAUSING PAIN NEAR INCISION ON HIS KNEE CAP. WANTS TO KNOW IF HE NEEDS TO CONTINUE TO DO THAT EXERCISE. ALSO, TOOK A LONG WALK OVER THE WEEKEND AND NOW HAS A BULGE NEAR INCISION, RED AND SORE. WANTS TO MAKE SURE HE'S OK. ALSO, OUT OF OXYCODONE 5 MG, WANTED TO KNOW IF WE COULD REFILL. WANTS IT SENT TO [facility] IN [place]. CALL BACK #[phone]

## 2019-12-02 NOTE — TELEPHONE ENCOUNTER
"I want to see him in clinic tomorrow morning to evaluate this \"bulge at incision\" with redness.  Place on PA schedule for tomorrow. We will refill meds at clinic appt tomorrow.  "

## 2019-12-03 ENCOUNTER — OFFICE VISIT (OUTPATIENT)
Dept: ORTHOPEDIC SURGERY | Facility: CLINIC | Age: 69
End: 2019-12-03

## 2019-12-03 DIAGNOSIS — Z96.651 S/P TOTAL KNEE REPLACEMENT USING CEMENT, RIGHT: ICD-10-CM

## 2019-12-03 DIAGNOSIS — Z09 SURGERY FOLLOW-UP: Primary | ICD-10-CM

## 2019-12-03 PROCEDURE — 99024 POSTOP FOLLOW-UP VISIT: CPT | Performed by: PHYSICIAN ASSISTANT

## 2019-12-03 NOTE — PROGRESS NOTES
[facility] Orthopaedic Surgery Clinic Note    Subjective     Patient: Harvey Levine  : 1950    Primary Care Provider: Antonia Conner MD    Requesting Provider: As above    Post-op (2.5 week recheck - S/P Right total knee arthroplasty 10/23/19)      History    History of Present Illness: Patient returns early with concerns of increasing pain and redness on his knee.  He reports he has been riding his spin bike and he has been walking around the yard.  He reports pain 5/10 he is taking ibuprofen for the pain.  He denies any fever or chills.    Current Outpatient Medications on File Prior to Visit   Medication Sig Dispense Refill   • ALPRAZolam (XANAX) 0.25 MG tablet Take 0.25 mg by mouth 2 (Two) Times a Day As Needed (RESTLESS LEG).     • amLODIPine (NORVASC) 10 MG tablet Take 10 mg by mouth Daily.     • aspirin 325 MG EC tablet Take 1 tablet by mouth Every 12 (Twelve) Hours for 1 month 60 tablet 0   • clobetasol (TEMOVATE) 0.05 % gel Apply 1 application topically to the appropriate area as directed Daily. ON GUMS     • diclofenac (VOLTAREN) 1 % gel gel Apply 4 g topically to the appropriate area as directed 4 (Four) Times a Day As Needed (prn for pain). 1 tube 5   • docusate sodium (COLACE) 100 MG capsule Take 1 capsule by mouth 2 (Two) Times a Day As Needed for Constipation. 60 capsule 0   • ketoconazole (NIZORAL) 2 % cream Apply 1 application topically to the appropriate area as directed Every Night.     • ondansetron (ZOFRAN) 4 MG tablet Take 1 tablet by mouth Every 8 (Eight) Hours As Needed for Nausea or Vomiting. 10 tablet 1   • oxyCODONE (ROXICODONE) 5 MG immediate release tablet Take 1 tablet by mouth Every 4 (Four) Hours As Needed for Moderate Pain . 30 tablet 0   • rOPINIRole (REQUIP) 0.5 MG tablet Take 1 hour before bedtime. And 1 at lunch (Patient taking differently: Take 0.5 mg by mouth 2 (Two) Times a Day. Take 1 hour before bedtime. And 1 at lunch) 180 tablet 3   • sertraline (ZOLOFT)  50 MG tablet Take 75 mg by mouth Daily.     • telmisartan (MICARDIS) 80 MG tablet Take 80 mg by mouth Daily.     • terbinafine (lamiSIL) 250 MG tablet Take 250 mg by mouth Every 2 (Two) Months. TAKE 1 TABLET BY MOUTH X 7 DAYS EVERY OTHER MONTH     • tiZANidine (ZANAFLEX) 4 MG tablet Take 4 mg by mouth Every 8 (Eight) Hours As Needed.       No current facility-administered medications on file prior to visit.       Allergies   Allergen Reactions   • Crestor [Rosuvastatin Calcium] Other (See Comments) and Myalgia     Liver failure   • Lipitor [Atorvastatin] Other (See Comments) and Myalgia     Liver failure   • Lisinopril Other (See Comments)     Liver failure   • Zetia [Ezetimibe] Other (See Comments)     Liver failure   • Adhesive Tape Other (See Comments)     Tearing of the skin      Past Medical History:   Diagnosis Date   • Anxiety    • Arthritis    • Hearing loss     WEAR AIDS   • Hyperlipidemia    • Hypertension    • Liver failure, acute     RELATED TO STATIN USE; RESOLVED.   • MVP (mitral valve prolapse)    • Psoriasis    • RLS (restless legs syndrome)    • Sleep apnea     WILL BRING MASK, TUBING, AND SETTINGS   • Wears glasses      Past Surgical History:   Procedure Laterality Date   • COLONOSCOPY  SPRING 2019   • ENDOSCOPY  09/29/2017    Dr. Medrano   • HERNIA REPAIR     • LIPOMA EXCISION Right     SHOULDER   • LIVER BIOPSY  02/20/2012   • MOUTH BIOPSY  05/25/2011   • NEUROMA SURGERY Right     KNEE   • SKIN BIOPSY  2018    Dr. Héctor Bernal   • TOTAL KNEE ARTHROPLASTY Right 10/23/2019    Procedure: TOTAL KNEE ARTHROPLASTY RIGHT;  Surgeon: Luis Harkins MD;  Location: [facility];  Service: Orthopedics   • VASECTOMY  01/01/1992     Urology     Family History   Problem Relation Age of Onset   • Breast cancer Mother    • Pancreatic cancer Mother    • Stomach cancer Maternal Grandmother    • Heart failure Father       Social History     Socioeconomic History   • Marital status:      Spouse name: Not on  file   • Number of children: Not on file   • Years of education: Not on file   • Highest education level: Not on file   Tobacco Use   • Smoking status: Never Smoker   • Smokeless tobacco: Never Used   Substance and Sexual Activity   • Alcohol use: Yes     Alcohol/week: 8.4 oz     Types: 14 Glasses of wine per week   • Drug use: No   • Sexual activity: Defer   Social History Narrative        Retired from IT    Second hand smoke exposure at work and as a child.    Drinks about 10 alcoholic drinks a week.         Review of Systems   Constitutional: Negative.    HENT: Negative.    Eyes: Negative.    Respiratory: Negative.    Cardiovascular: Negative.    Gastrointestinal: Negative.    Endocrine: Negative.    Genitourinary: Negative.    Musculoskeletal: Positive for arthralgias and joint swelling.   Skin: Negative.    Allergic/Immunologic: Negative.    Neurological: Negative.    Hematological: Negative.    Psychiatric/Behavioral: Negative.        The following portions of the patient's history were reviewed and updated as appropriate: allergies, current medications, past family history, past medical history, past social history, past surgical history and problem list.      Objective      Physical Exam  There were no vitals taken for this visit.    There is no height or weight on file to calculate BMI.    Ortho Exam  Right knee exam:  Anterior knee incision is healing well.  Patient has a mild effusion.  He has good range of motion with stable ligamentous exam.  Mild warmth as expected.  Neurovascularly intact.  Ambulating with no assistive device.    Medical Decision Making    Data Review:   ordered and reviewed x-rays today    Assessment:  1. Surgery follow-up    2. S/P total knee replacement using cement, right        Plan:  Doing well status post right total knee arthroplasty on 10/23/2019.  X-rays today show well position, cemented total knee arthroplasty with no evidence of ostial lysis, subsidence or  fracture.  We discussed with the patient that his increased pain and swelling is due to overuse.  He is doing activities such as walking on uneven ground at 6 weeks postop that still may be difficult at 3 months postop.  Recommendation is that he back off on his activity.  He may continue to ride his spin bike but I recommend he discontinue walking around the  yard on uneven ground.  He will return to see Dr. Harkins in 2 months with repeat x-rays or sooner if needed.    History, diagnosis and treatment plan discussed with Dr. Harkins.          Sabiha Franz PA-C  12/03/19  2:12 PM

## 2019-12-10 ENCOUNTER — TELEPHONE (OUTPATIENT)
Dept: ORTHOPEDIC SURGERY | Facility: CLINIC | Age: 69
End: 2019-12-10

## 2019-12-10 RX ORDER — TRAMADOL HYDROCHLORIDE 50 MG/1
50 TABLET ORAL EVERY 4 HOURS PRN
Qty: 30 TABLET | Refills: 0 | Status: SHIPPED | OUTPATIENT
Start: 2019-12-10 | End: 2019-12-18

## 2019-12-10 NOTE — TELEPHONE ENCOUNTER
----- Message from Harvey Levine sent at 12/10/2019  4:21 AM EST -----  Regarding: Prescription Question  Contact: [phone]  Amador Landis,  Two things, 1) my dentist wants to know if He should give me an antibiotic when I have my teeth cleaned or other procedures.  2) Could Dr. Harkins prescribe some a bit stronger than Ibuprofen or Aleve , because after PT or doing my daily exercises my knee gets pretty sore and stuff   Thanks,  Nathan Levine

## 2019-12-11 ENCOUNTER — TELEPHONE (OUTPATIENT)
Dept: ORTHOPEDIC SURGERY | Facility: CLINIC | Age: 69
End: 2019-12-11

## 2019-12-11 NOTE — TELEPHONE ENCOUNTER
KIRT from patient - top message only - Dominique          ----- Message from Harvey Levine sent at 12/11/2019 11:59 AM EST -----  Regarding: RE: Prescription Question  Contact: [phone]  Thank You for following up on my request.  The PT is using weights with my exercises and afterwards my leg can feel the exercertion.  Wish you the best During the Holiday Season.  Nathan Sameul            ----- Message -----  From: Dominique ALY  Sent: 12/11/2019 10:01 AM EST  To: Harvey Levine  Subject: RE: Prescription Question  1. You will need to take a antibiotic before any dental cleanings or procedures.  Dr. Harkins prescribes Amoxicillin 500 mg.  Take 4 tabs (200mg total)  One hour prior to appointment.   2. Tramadol was sent to your pharmacy.      Let me know if you need anything further.    Dominique Franz    ----- Message -----     From: Harvey Levine     Sent: 12/10/2019  4:21 AM EST       To: Sabiha Franz PA-C  Subject: Prescription Question    Amador Landis,  Two things, 1) my dentist wants to know if He should give me an antibiotic when I have my teeth cleaned or other procedures.  2) Could Dr. Harkins prescribe some a bit stronger than Ibuprofen or Aleve , because after PT or doing my daily exercises my knee gets pretty sore and stuff   Thanks,  Nathan Levine

## 2019-12-18 ENCOUNTER — TELEPHONE (OUTPATIENT)
Dept: ORTHOPEDIC SURGERY | Facility: CLINIC | Age: 69
End: 2019-12-18

## 2019-12-18 NOTE — TELEPHONE ENCOUNTER
I called the pharmacy in [place] for the patient of Tramadol 50mg #30 take every 4 hrs prn for moderate pain 0 refills.     I also called the patient to advise that I have completed this for him, left a voicemail.    Abigail

## 2019-12-18 NOTE — TELEPHONE ENCOUNTER
I called [facility] and canceled the Tramadol and will call in a new prescription to the pharmacy in [place], [place].    Abigail

## 2019-12-18 NOTE — TELEPHONE ENCOUNTER
PT IS WANTING THE TRAMADOL CALLED IN TO THE [facility] IN [place]. HE IS REQUESTING THE ORDER THAT WAS SENT TO [facility] BE CANCELLED AND SENT TO THE [place] LOCATION. HE CAN BE REACHED [phone]

## 2020-02-04 ENCOUNTER — OFFICE VISIT (OUTPATIENT)
Dept: ORTHOPEDIC SURGERY | Facility: CLINIC | Age: 70
End: 2020-02-04

## 2020-02-04 VITALS — HEART RATE: 54 BPM | BODY MASS INDEX: 26.87 KG/M2 | OXYGEN SATURATION: 96 % | HEIGHT: 62 IN | WEIGHT: 146 LBS

## 2020-02-04 DIAGNOSIS — Z09 SURGERY FOLLOW-UP: Primary | ICD-10-CM

## 2020-02-04 DIAGNOSIS — Z96.651 S/P TOTAL KNEE REPLACEMENT USING CEMENT, RIGHT: ICD-10-CM

## 2020-02-04 PROCEDURE — 99212 OFFICE O/P EST SF 10 MIN: CPT | Performed by: ORTHOPAEDIC SURGERY

## 2020-02-04 RX ORDER — TRAMADOL HYDROCHLORIDE 50 MG/1
50 TABLET ORAL EVERY 4 HOURS PRN
COMMUNITY
Start: 2019-12-18 | End: 2022-09-30

## 2020-02-04 NOTE — PROGRESS NOTES
Orthopaedic Clinic Note:  Knee Post Op    Chief Complaint   Patient presents with   • Post-op Follow-up     2 month f/u; 4 months s/p Right Total Knee Arthroplasty 10/23/19        HPI    Mr. Levine is 4  month(s) s/p right total knee arthroplasty. Rates pain 1/10. He is ambulating with no assistive device and is taking nothing for pain control. He denies fevers, chills, or constitutional symptoms.  He has completed outpatient PT. Patient is improving overall.  He is happy with the outcome.  Denies complications..    Past Medical History:   Diagnosis Date   • Anxiety    • Arthritis    • Hearing loss     WEAR AIDS   • Hyperlipidemia    • Hypertension    • Liver failure, acute     RELATED TO STATIN USE; RESOLVED.   • MVP (mitral valve prolapse)    • Psoriasis    • RLS (restless legs syndrome)    • Sleep apnea     WILL BRING MASK, TUBING, AND SETTINGS   • Wears glasses       Past Surgical History:   Procedure Laterality Date   • COLONOSCOPY  SPRING 2019   • ENDOSCOPY  09/29/2017    Dr. Medrano   • HERNIA REPAIR     • LIPOMA EXCISION Right     SHOULDER   • LIVER BIOPSY  02/20/2012   • MOUTH BIOPSY  05/25/2011   • NEUROMA SURGERY Right     KNEE   • SKIN BIOPSY  2018    Dr. Héctor Bernal   • TOTAL KNEE ARTHROPLASTY Right 10/23/2019    Procedure: TOTAL KNEE ARTHROPLASTY RIGHT;  Surgeon: Luis Harkins MD;  Location: [facility];  Service: Orthopedics   • VASECTOMY  01/01/1992     Urology     Family History   Problem Relation Age of Onset   • Breast cancer Mother    • Pancreatic cancer Mother    • Stomach cancer Maternal Grandmother    • Heart failure Father       Social History     Socioeconomic History   • Marital status:      Spouse name: Not on file   • Number of children: Not on file   • Years of education: Not on file   • Highest education level: Not on file   Tobacco Use   • Smoking status: Never Smoker   • Smokeless tobacco: Never Used   Substance and Sexual Activity   • Alcohol use: Yes     Alcohol/week: 14.0  standard drinks     Types: 14 Glasses of wine per week   • Drug use: No   • Sexual activity: Defer   Social History Narrative        Retired from IT    Second hand smoke exposure at work and as a child.    Drinks about 10 alcoholic drinks a week.       Current Outpatient Medications on File Prior to Visit   Medication Sig Dispense Refill   • ALPRAZolam (XANAX) 0.25 MG tablet Take 0.25 mg by mouth 2 (Two) Times a Day As Needed (RESTLESS LEG).     • amLODIPine (NORVASC) 10 MG tablet Take 10 mg by mouth Daily.     • aspirin 325 MG EC tablet Take 1 tablet by mouth Every 12 (Twelve) Hours for 1 month 60 tablet 0   • clobetasol (TEMOVATE) 0.05 % gel Apply 1 application topically to the appropriate area as directed Daily. ON GUMS     • diclofenac (VOLTAREN) 1 % gel gel Apply 4 g topically to the appropriate area as directed 4 (Four) Times a Day As Needed (prn for pain). 1 tube 5   • docusate sodium (COLACE) 100 MG capsule Take 1 capsule by mouth 2 (Two) Times a Day As Needed for Constipation. 60 capsule 0   • ketoconazole (NIZORAL) 2 % cream Apply 1 application topically to the appropriate area as directed Every Night.     • ondansetron (ZOFRAN) 4 MG tablet Take 1 tablet by mouth Every 8 (Eight) Hours As Needed for Nausea or Vomiting. 10 tablet 1   • oxyCODONE (ROXICODONE) 5 MG immediate release tablet Take 1 tablet by mouth Every 4 (Four) Hours As Needed for Moderate Pain . 30 tablet 0   • rOPINIRole (REQUIP) 0.5 MG tablet Take 1 hour before bedtime. And 1 at lunch (Patient taking differently: Take 0.5 mg by mouth 2 (Two) Times a Day. Take 1 hour before bedtime. And 1 at lunch) 180 tablet 3   • sertraline (ZOLOFT) 50 MG tablet Take 75 mg by mouth Daily.     • telmisartan (MICARDIS) 80 MG tablet Take 80 mg by mouth Daily.     • terbinafine (lamiSIL) 250 MG tablet Take 250 mg by mouth Every 2 (Two) Months. TAKE 1 TABLET BY MOUTH X 7 DAYS EVERY OTHER MONTH     • tiZANidine (ZANAFLEX) 4 MG tablet Take 4 mg by mouth  "Every 8 (Eight) Hours As Needed.     • traMADol (ULTRAM) 50 MG tablet Take 50 mg by mouth Every 4 (Four) Hours As Needed.       No current facility-administered medications on file prior to visit.       Allergies   Allergen Reactions   • Crestor [Rosuvastatin Calcium] Other (See Comments) and Myalgia     Liver failure   • Lipitor [Atorvastatin] Other (See Comments) and Myalgia     Liver failure   • Lisinopril Other (See Comments)     Liver failure   • Zetia [Ezetimibe] Other (See Comments)     Liver failure   • Adhesive Tape Other (See Comments)     Tearing of the skin        Review of Systems   Constitutional: Positive for activity change.   HENT: Positive for tinnitus.    Eyes: Negative.    Respiratory: Negative.    Cardiovascular: Negative.    Gastrointestinal: Negative.    Endocrine: Negative.    Genitourinary: Negative.    Musculoskeletal: Positive for arthralgias.   Skin: Negative.    Allergic/Immunologic: Negative.    Neurological: Negative.    Hematological: Negative.    Psychiatric/Behavioral: Negative.         Physical Exam  Pulse 54, height 157.5 cm (62.01\"), weight 66.2 kg (146 lb), SpO2 96 %.    Body mass index is 26.7 kg/m².    GENERAL APPEARANCE: awake, alert, oriented, in no acute distress and well developed, well nourished  LUNGS:  breathing nonlabored  EXTREMITIES: no clubbing, cyanosis  PERIPHERAL PULSES: palpable dorsalis pedis and posterior tibial pulses bilaterally.    GAIT:  Normal          Right Knee Exam:  ----------  ALIGNMENT: neutral  ----------  RANGE OF MOTION:  Normal (0-120 degrees) with no extensor lag or flexion contracture  LIGAMENTOUS STABILITY:   stable to varus and valgus stress at terminal extension and 30 degrees without any evidence of laxity  ----------  STRENGTH:  KNEE FLEXION 5/5  KNEE EXTENSION  5/5  ANKLE DORSIFLEXION  5/5  ANKLE PLANTARFLEXION  5/5  ----------  PAIN WITH PALPATION:denies tenderness to palpation about the knee  KNEE EFFUSION: no  PAIN WITH KNEE ROM: " no  PATELLAR CREPITUS:  no  ----------  SENSATION TO LIGHT TOUCH:  DEEP PERONEAL/SUPERFICIAL PERONEAL/SURAL/SAPHENOUS/TIBIAL:    intact  ----------  EDEMA:  no  ERYTHEMA:    no  WOUNDS/INCISIONS:   yes, well healed surgical incision without evidence of erythema or drainage  _____________________________________________________________________  _____________________________________________________________________    RADIOGRAPHIC FINDINGS:   Indication: Status post right total knee arthroplasty    Comparison: Todays xrays were compared to previous xrays from  12/3/2019     Knee films: Demonstrate well positioned knee arthroplasty components in satisfactory alignment without evidence of wear, loosening, subsidence, fracture, or osteolysis and No significant changes compared to prior radiographs.       Assessment/Plan:   Diagnosis Plan   1. Surgery follow-up  XR Knee 3+ View With [place] Right   2. S/P total knee replacement using cement, right       Patient is doing well 4-month status post right total knee arthroplasty.  I recommended continued activity as tolerated without restrictions.  I will see him back in 3 months for repeat assessment x-ray 3 views right knee.    Luis Harkins MD  02/04/20  1:11 PM

## 2021-01-07 ENCOUNTER — TRANSCRIBE ORDERS (OUTPATIENT)
Dept: ADMINISTRATIVE | Facility: HOSPITAL | Age: 71
End: 2021-01-07

## 2021-01-07 ENCOUNTER — HOSPITAL ENCOUNTER (OUTPATIENT)
Dept: GENERAL RADIOLOGY | Facility: HOSPITAL | Age: 71
Discharge: HOME | End: 2021-01-07
Admitting: FAMILY MEDICINE

## 2021-01-07 DIAGNOSIS — R07.81 RIB PAIN ON RIGHT SIDE: Primary | ICD-10-CM

## 2021-01-07 DIAGNOSIS — R07.81 RIB PAIN ON RIGHT SIDE: ICD-10-CM

## 2021-01-07 PROCEDURE — 71046 X-RAY EXAM CHEST 2 VIEWS: CPT

## 2022-09-30 PROCEDURE — U0004 COV-19 TEST NON-CDC HGH THRU: HCPCS | Performed by: FAMILY MEDICINE

## 2023-02-07 ENCOUNTER — OFFICE VISIT (OUTPATIENT)
Dept: ORTHOPEDIC SURGERY | Facility: CLINIC | Age: 73
End: 2023-02-07
Payer: MEDICARE

## 2023-02-07 VITALS
HEIGHT: 62 IN | SYSTOLIC BLOOD PRESSURE: 130 MMHG | WEIGHT: 151.4 LBS | BODY MASS INDEX: 27.86 KG/M2 | DIASTOLIC BLOOD PRESSURE: 88 MMHG

## 2023-02-07 DIAGNOSIS — Z96.651 HISTORY OF RIGHT KNEE JOINT REPLACEMENT: ICD-10-CM

## 2023-02-07 DIAGNOSIS — M76.51 PATELLAR TENDINITIS, RIGHT KNEE: Primary | ICD-10-CM

## 2023-02-07 PROCEDURE — 99204 OFFICE O/P NEW MOD 45 MIN: CPT | Performed by: ORTHOPAEDIC SURGERY

## 2023-02-07 RX ORDER — MAGNESIUM GLUCONATE 27 MG(500)
1 TABLET ORAL DAILY
COMMUNITY

## 2023-02-07 RX ORDER — PRAMIPEXOLE DIHYDROCHLORIDE 0.5 MG/1
0.75 TABLET ORAL
COMMUNITY

## 2023-02-07 RX ORDER — DIPHENOXYLATE HYDROCHLORIDE AND ATROPINE SULFATE 2.5; .025 MG/1; MG/1
1 TABLET ORAL DAILY
COMMUNITY

## 2023-02-07 RX ORDER — CLOBETASOL PROPIONATE 0.05 MG/G
GEL TOPICAL
COMMUNITY
Start: 2023-01-10

## 2023-02-07 RX ORDER — ROPINIROLE 1 MG/1
TABLET, FILM COATED ORAL
COMMUNITY
Start: 2023-01-12

## 2023-02-07 RX ORDER — MELOXICAM 15 MG/1
TABLET ORAL
Qty: 90 TABLET | Refills: 1 | Status: SHIPPED | OUTPATIENT
Start: 2023-02-07 | End: 2023-03-28
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
## 2023-03-04 ENCOUNTER — HOSPITAL ENCOUNTER (EMERGENCY)
Facility: HOSPITAL | Age: 73
Discharge: HOME | End: 2023-03-04
Attending: EMERGENCY MEDICINE | Admitting: EMERGENCY MEDICINE
Payer: MEDICARE

## 2023-03-04 ENCOUNTER — APPOINTMENT (OUTPATIENT)
Dept: GENERAL RADIOLOGY | Facility: HOSPITAL | Age: 73
End: 2023-03-04
Payer: MEDICARE

## 2023-03-04 VITALS
WEIGHT: 148 LBS | RESPIRATION RATE: 17 BRPM | HEART RATE: 55 BPM | DIASTOLIC BLOOD PRESSURE: 80 MMHG | OXYGEN SATURATION: 95 % | BODY MASS INDEX: 27.23 KG/M2 | SYSTOLIC BLOOD PRESSURE: 129 MMHG | HEIGHT: 62 IN | TEMPERATURE: 98.3 F
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
## 2023-03-04 DIAGNOSIS — S81.011A KNEE LACERATION, RIGHT, INITIAL ENCOUNTER: ICD-10-CM

## 2023-03-04 LAB
HCT VFR BLD AUTO: 44.6 % (ref 37.5–51)
HGB BLD-MCNC: 15.1 G/DL (ref 13–17.7)

## 2023-03-04 PROCEDURE — 96374 THER/PROPH/DIAG INJ IV PUSH: CPT

## 2023-03-04 PROCEDURE — 73560 X-RAY EXAM OF KNEE 1 OR 2: CPT

## 2023-03-04 PROCEDURE — 36415 COLL VENOUS BLD VENIPUNCTURE: CPT

## 2023-03-04 PROCEDURE — 85018 HEMOGLOBIN: CPT | Performed by: EMERGENCY MEDICINE

## 2023-03-04 PROCEDURE — 25010000002 FENTANYL CITRATE (PF) 50 MCG/ML SOLUTION: Performed by: EMERGENCY MEDICINE

## 2023-03-04 PROCEDURE — 99283 EMERGENCY DEPT VISIT LOW MDM: CPT

## 2023-03-04 PROCEDURE — 85014 HEMATOCRIT: CPT | Performed by: EMERGENCY MEDICINE

## 2023-03-04 PROCEDURE — 25010000002 HYDROMORPHONE 1 MG/ML SOLUTION: Performed by: EMERGENCY MEDICINE

## 2023-03-04 PROCEDURE — 96375 TX/PRO/DX INJ NEW DRUG ADDON: CPT

## 2023-03-04 RX ORDER — LIDOCAINE HYDROCHLORIDE 10 MG/ML
10 INJECTION, SOLUTION EPIDURAL; INFILTRATION; INTRACAUDAL; PERINEURAL ONCE
Status: COMPLETED | OUTPATIENT
Start: 2023-03-04 | End: 2023-03-04

## 2023-03-04 RX ORDER — DOCUSATE SODIUM 100 MG/1
100 CAPSULE, LIQUID FILLED ORAL 2 TIMES DAILY PRN
Qty: 30 CAPSULE | Refills: 0 | Status: SHIPPED | OUTPATIENT
Start: 2023-03-04

## 2023-03-04 RX ORDER — AMOXICILLIN AND CLAVULANATE POTASSIUM 875; 125 MG/1; MG/1
1 TABLET, FILM COATED ORAL 2 TIMES DAILY
Qty: 20 TABLET | Refills: 0 | Status: SHIPPED | OUTPATIENT
Start: 2023-03-04 | End: 2023-03-06 | Stop reason: ALTCHOICE

## 2023-03-04 RX ORDER — HYDROCODONE BITARTRATE AND ACETAMINOPHEN 5; 325 MG/1; MG/1
1 TABLET ORAL EVERY 4 HOURS PRN
Qty: 10 TABLET | Refills: 0 | Status: SHIPPED | OUTPATIENT
Start: 2023-03-04 | End: 2023-03-28

## 2023-03-04 RX ORDER — SACCHAROMYCES BOULARDII 250 MG
250 CAPSULE ORAL 2 TIMES DAILY
Qty: 20 CAPSULE | Refills: 0 | Status: SHIPPED | OUTPATIENT
Start: 2023-03-04 | End: 2023-03-14

## 2023-03-04 RX ORDER — FENTANYL CITRATE 50 UG/ML
75 INJECTION, SOLUTION INTRAMUSCULAR; INTRAVENOUS ONCE
Status: COMPLETED | OUTPATIENT
Start: 2023-03-04 | End: 2023-03-04

## 2023-03-04 RX ADMIN — FENTANYL CITRATE 75 MCG: 50 INJECTION, SOLUTION INTRAMUSCULAR; INTRAVENOUS at 18:01

## 2023-03-04 RX ADMIN — LIDOCAINE HYDROCHLORIDE 10 ML: 10 INJECTION, SOLUTION EPIDURAL; INFILTRATION; INTRACAUDAL; PERINEURAL at 16:37

## 2023-03-04 RX ADMIN — LIDOCAINE HYDROCHLORIDE 10 ML: 10 INJECTION, SOLUTION EPIDURAL; INFILTRATION; INTRACAUDAL; PERINEURAL at 19:28

## 2023-03-04 RX ADMIN — HYDROMORPHONE HYDROCHLORIDE 1 MG: 1 INJECTION, SOLUTION INTRAMUSCULAR; INTRAVENOUS; SUBCUTANEOUS at 16:54

## 2023-03-04 NOTE — ED PROVIDER NOTES
Subjective   History of Present Illness  Harvey Levine is a 72-year-old male that presents emergency department for complaints of a laceration to his right knee.  Patient was cleaning up the porch and fell over a few planters, Pt landed on the concrete.    Patient has a laceration to his right knee.  Injury took place approximately 3 hours ago.  Patient's tetanus shot is up-to-date.  Patient is able to ambulate on the knee.  Patient denies hitting his head or any loss of consciousness.  He denies any neck pain, back pain.    History provided by:  Patient   used: No    Knee Pain  Location:  Knee  Injury: yes    Mechanism of injury: fall    Fall:     Fall occurred:  Standing and tripped    Impact surface:  [place]    Point of impact:  Knees  Knee location:  R knee  Pain details:     Quality:  Throbbing    Duration:  3 hours    Timing:  Constant  Associated symptoms: swelling    Associated symptoms: no back pain, no decreased ROM, no neck pain, no numbness and no tingling        Review of Systems   Musculoskeletal: Negative for back pain and neck pain.        Knee pain ( RT)    Skin: Positive for wound.   Neurological: Negative for weakness.       Past Medical History:   Diagnosis Date   • Acid reflux    • Anxiety    • Arthritis    • Hearing loss     WEAR AIDS   • Hyperlipidemia    • Hypertension    • Liver failure, acute     RELATED TO STATIN USE; RESOLVED.   • MVP (mitral valve prolapse)    • Psoriasis    • RLS (restless legs syndrome)    • Sclerosing mesenteritis (HCC)    • Sleep apnea     WILL BRING MASK, TUBING, AND SETTINGS   • Wears glasses        Allergies   Allergen Reactions   • Crestor [Rosuvastatin Calcium] Other (See Comments) and Myalgia     Liver failure   • Lipitor [Atorvastatin] Other (See Comments) and Myalgia     Liver failure   • Lisinopril Other (See Comments)     Liver failure   • Zetia [Ezetimibe] Other (See Comments)     Liver failure   • Adhesive Tape Other (See Comments)      Tearing of the skin       Past Surgical History:   Procedure Laterality Date   • COLONOSCOPY  [place] 2019   • ENDOSCOPY  09/29/2017    Dr. Medrano   • HERNIA REPAIR     • LIPOMA EXCISION Right     SHOULDER   • LIVER BIOPSY  02/20/2012   • MOUTH BIOPSY  05/25/2011   • NEUROMA SURGERY Right     KNEE   • SKIN BIOPSY  2018    Dr. Héctor Bernal   • TOTAL KNEE ARTHROPLASTY Right 10/23/2019    Procedure: TOTAL KNEE ARTHROPLASTY RIGHT;  Surgeon: Luis Harkins MD;  Location: [facility];  Service: Orthopedics   • VASECTOMY  01/01/1992     Urology       Family History   Problem Relation Age of Onset   • Breast cancer Mother    • Pancreatic cancer Mother    • Stomach cancer Maternal Grandmother    • Heart failure Father        Social History     Socioeconomic History   • Marital status:    Tobacco Use   • Smoking status: Never     Passive exposure: Never   • Smokeless tobacco: Never   Vaping Use   • Vaping Use: Never used   Substance and Sexual Activity   • Alcohol use: Yes     Alcohol/week: 14.0 standard drinks     Types: 14 Glasses of wine per week     Comment: 2-3 drinks daily   • Drug use: No   • Sexual activity: Defer           Objective   Physical Exam  Vitals and nursing note reviewed.   Constitutional:       General: He is not in acute distress.     Appearance: Normal appearance. He is not ill-appearing.   HENT:      Head: Normocephalic and atraumatic.      Nose: Nose normal.   Eyes:      Extraocular Movements: Extraocular movements intact.   Cardiovascular:      Rate and Rhythm: Normal rate and regular rhythm.      Heart sounds: Normal heart sounds.   Musculoskeletal:      Cervical back: Normal range of motion.      Comments: Rt knee :  Anterior knee laceration     Skin:     General: Skin is warm and dry.   Neurological:      Mental Status: He is alert and oriented to person, place, and time.   Psychiatric:         Mood and Affect: Mood normal.         Procedures           ED Course                                            MDM    Final diagnoses:   Fall, initial encounter   Knee laceration, right, initial encounter       ED Disposition  ED Disposition     ED Disposition   Discharge    Condition   Stable    Comment   --             Luis Harkins MD  [address]  [address]  [place] [id number] [phone]    In 7 days      Venkatesh Cullen MD  [address]  [address]  [facility] [address]  [phone]    Schedule an appointment as soon as possible for a visit       [facility] Emergency Department  [address] [address] [phone]    If symptoms worsen         Medication List      New Prescriptions    amoxicillin-clavulanate 875-125 MG per tablet  Commonly known as: AUGMENTIN  Take 1 tablet by mouth 2 (Two) Times a Day for 10 days.     docusate sodium 100 MG capsule  Commonly known as: COLACE  Take 1 capsule by mouth 2 (Two) Times a Day As Needed for Constipation.     saccharomyces boulardii 250 MG capsule  Commonly known as: FLORASTOR  Take 1 capsule by mouth 2 (Two) Times a Day for 10 days.        Changed    * HYDROcodone-acetaminophen 5-325 MG per tablet  Commonly known as: NORCO  What changed: Another medication with the same name was added. Make sure you understand how and when to take each.     * HYDROcodone-acetaminophen 5-325 MG per tablet  Commonly known as: NORCO  Take 1 tablet by mouth Every 4 (Four) Hours As Needed for Severe Pain.  What changed: You were already taking a medication with the same name, and this prescription was added. Make sure you understand how and when to take each.         * This list has 2 medication(s) that are the same as other medications prescribed for you. Read the directions carefully, and ask your doctor or other care provider to review them with you.            Stop    amoxicillin 500 MG capsule  Commonly known as: AMOXIL           Where to Get Your Medications      These medications were sent to [facility] – [place], [place] - [address] - [phone]  - [phone] FX  [address] [address]    Phone: [phone]   · amoxicillin-clavulanate 875-125 MG per tablet  · docusate sodium 100 MG capsule  · HYDROcodone-acetaminophen 5-325 MG per tablet  · saccharomyces boulardii 250 MG capsule          Talisha Beavers, APRN  03/04/23 [id number]

## 2023-03-06 ENCOUNTER — TELEPHONE (OUTPATIENT)
Dept: ORTHOPEDIC SURGERY | Facility: CLINIC | Age: 73
End: 2023-03-06
Payer: MEDICARE

## 2023-03-06 RX ORDER — AMOXICILLIN AND CLAVULANATE POTASSIUM 500; 125 MG/1; MG/1
1 TABLET, FILM COATED ORAL 3 TIMES DAILY
Qty: 30 TABLET | Refills: 0 | Status: SHIPPED | OUTPATIENT
Start: 2023-03-06 | End: 2023-03-14 | Stop reason: SDUPTHER

## 2023-03-06 NOTE — TELEPHONE ENCOUNTER
Provider: DR MCGARRY  Caller:BOY   Relationship to Patient: WIFE  Phone Number:  [phone]  Reason for Call: RIGHT KNEE SX 10/2019  PATIENT FELL AT HOME 03/04/23 LANDING ON RIGHT KNEE, WENT TO [facility] ED.  ED NOTES, RIGHT KNEE XRAY 03/04/23  ED DOCTORS WOULD LIKE FOR PATIENT TO FOLLOW UP WITH DR MCGARRY AND WOULD ALSO LIKE FOR HIS STITCHES TO BE REMOVED IN 7 DAYS BY DR MCGARRY IF POSSIBLE.  PATIENT WOULD NEED TO BE SCHEDULED  AROUND 03/11/23 FOR REMOVAL .  WILL DR MCGARRY REMOVE STITCHES PUT IN BY ED?  PLEASE CALL PATIENT TO DISCUSS/ADVISE/SCHEDULE

## 2023-03-06 NOTE — TELEPHONE ENCOUNTER
Returned patients call. His wife answered, I let her know that we can take the stitches out from the ER when he comes in for a follow up. I scheduled him to come in on 3/14/2023 @ 1:40pm to see Dr. Harkins.     - Nicole ENGLAND(R)

## 2023-03-07 NOTE — ED PROCEDURE NOTE
Place of Service:[facility] EMERGENCY DEPARTMENT  Patient Name:Harvey Levine  :1950    Procedure  Laceration Repair    Date/Time: 3/4/2023 5:51 PM  Performed by: Seferino Hopper DO  Authorized by: Seferino Hopper DO     Consent:     Consent obtained:  Verbal    Consent given by:  Patient    Risks, benefits, and alternatives were discussed: yes      Risks discussed:  Infection, pain, retained foreign body, need for additional repair, poor cosmetic result, tendon damage, nerve damage, vascular damage and poor wound healing    Alternatives discussed:  Delayed treatment, observation, referral and no treatment  Universal protocol:     Procedure explained and questions answered to patient or proxy's satisfaction: yes      Relevant documents present and verified: yes      Test results available: yes      Imaging studies available: yes      Required blood products, implants, devices, and special equipment available: yes      Site/side marked: yes      Immediately prior to procedure, a time out was called: yes      Patient identity confirmed:  Verbally with patient  Anesthesia:     Anesthesia method:  Local infiltration    Local anesthetic:  Lidocaine 1% w/o epi  Laceration details:     Location:  Leg    Leg location:  R knee    Length (cm):  8    Depth (mm):  3  Pre-procedure details:     Preparation:  Patient was prepped and draped in usual sterile fashion and imaging obtained to evaluate for foreign bodies  Exploration:     Limited defect created (wound extended): yes      Hemostasis achieved with:  Direct pressure and tied off vessels    Imaging obtained: x-ray      Imaging outcome: foreign body not noted      Wound exploration: wound explored through full range of motion and entire depth of wound visualized      Wound extent: vascular damage      Wound extent: no foreign bodies/material noted and no underlying fracture noted      Contaminated: no    Treatment:     Area cleansed with:   Povidone-iodine, chlorhexidine and saline    Amount of cleaning:  Extensive    Irrigation solution:  Sterile water and sterile saline    Irrigation volume:  1000    Irrigation method:  Syringe    Visualized foreign bodies/material removed: no      Debridement:  None    Undermining:  None    Scar revision: no    Skin repair:     Repair method:  Sutures    Suture size:  4-0    Suture material:  Nylon and Prolene    Suture technique:  Horizontal mattress and running    Number of sutures:  20  Approximation:     Approximation:  Close  Repair type:     Repair type:  Intermediate  Post-procedure details:     Dressing:  Non-adherent dressing, sterile dressing, adhesive bandage, splint for protection and bulky dressing    Procedure completion:  Tolerated well, no immediate complications  Comments:      Laceration repair was complicated given the tension due to the surrounding edema and swelling.  5 horizontal mattress sutures were placed due to the high tension and to approximate the wound edges.  Following the horizontal mattress sutures a running suture was placed down the length of the laceration for better approximation.  I discussed the case with orthopedics.  Dr. Martinez, partners with Dr. Harkins, came to the emergency department and evaluated the patient.  He does not believe that the laceration is intra-articular.  He consulted with Dr. Harkins also.  We will prescribe Augmentin and the patient will follow-up with Dr. Harkins in the office in approximately 1 week.  He will be given a knee immobilizer and I have emphasized the importance of elevation.  They will also place a cool compress on the wound this evening.  They understand have a low threshold to return to the emergency department if any concerns arise.  Otherwise they will follow-up with orthopedics, Dr. Harkins, in 1 week for reevaluation.  Patient and family are happy with care and comfortable with this plan.              Seferino Hopper, DO  03/07/23  0703

## 2023-03-14 ENCOUNTER — OFFICE VISIT (OUTPATIENT)
Dept: ORTHOPEDIC SURGERY | Facility: CLINIC | Age: 73
End: 2023-03-14
Payer: MEDICARE

## 2023-03-14 VITALS
DIASTOLIC BLOOD PRESSURE: 80 MMHG | BODY MASS INDEX: 27.22 KG/M2 | WEIGHT: 147.93 LBS | SYSTOLIC BLOOD PRESSURE: 128 MMHG | HEIGHT: 62 IN

## 2023-03-14 DIAGNOSIS — S81.011A LACERATION OF RIGHT KNEE, INITIAL ENCOUNTER: ICD-10-CM

## 2023-03-14 DIAGNOSIS — Z96.651 HISTORY OF RIGHT KNEE JOINT REPLACEMENT: Primary | ICD-10-CM

## 2023-03-14 PROCEDURE — 1159F MED LIST DOCD IN RCRD: CPT | Performed by: ORTHOPAEDIC SURGERY

## 2023-03-14 PROCEDURE — 1160F RVW MEDS BY RX/DR IN RCRD: CPT | Performed by: ORTHOPAEDIC SURGERY

## 2023-03-14 PROCEDURE — 99214 OFFICE O/P EST MOD 30 MIN: CPT | Performed by: ORTHOPAEDIC SURGERY

## 2023-03-14 PROCEDURE — 3074F SYST BP LT 130 MM HG: CPT | Performed by: ORTHOPAEDIC SURGERY

## 2023-03-14 PROCEDURE — 3079F DIAST BP 80-89 MM HG: CPT | Performed by: ORTHOPAEDIC SURGERY

## 2023-03-14 RX ORDER — AMOXICILLIN AND CLAVULANATE POTASSIUM 500; 125 MG/1; MG/1
1 TABLET, FILM COATED ORAL 3 TIMES DAILY
Qty: 30 TABLET | Refills: 0 | Status: SHIPPED | OUTPATIENT
Start: 2023-03-14 | End: 2023-03-24

## 2023-03-14 NOTE — PROGRESS NOTES
Orthopaedic Clinic Note: Knee Established Patient    Chief Complaint   Patient presents with   • Follow-up     1 month follow up -  Patellar tendinitis, right knee             HPI    It has been 4  week(s) since Mr. Levine's last visit. He returns to clinic today for follow-up laceration of right knee.  He sustained a mechanical fall 10 days ago and landed on his right knee.  The traumatic laceration overlying the anterior lateral aspect of the knee that was closed primarily in the ER.  He was prescribed Augmentin and has been on this for the past 9 days.  Currently rates his pain a 4/10 on the pain scale.  Localizes it to the anterior knee.  Denies any deep knee pain or swelling.  He is ambulatory with assistance of a cane.    Past Medical History:   Diagnosis Date   • Acid reflux    • Anxiety    • Arthritis    • Hearing loss     WEAR AIDS   • Hyperlipidemia    • Hypertension    • Liver failure, acute     RELATED TO STATIN USE; RESOLVED.   • MVP (mitral valve prolapse)    • Psoriasis    • RLS (restless legs syndrome)    • Sclerosing mesenteritis (HCC)    • Sleep apnea     WILL BRING MASK, TUBING, AND SETTINGS   • Wears glasses       Past Surgical History:   Procedure Laterality Date   • COLONOSCOPY  [place] 2019   • ENDOSCOPY  09/29/2017    Dr. Medrano   • HERNIA REPAIR     • LIPOMA EXCISION Right     SHOULDER   • LIVER BIOPSY  02/20/2012   • MOUTH BIOPSY  05/25/2011   • NEUROMA SURGERY Right     KNEE   • SKIN BIOPSY  2018    Dr. Héctor Bernal   • TOTAL KNEE ARTHROPLASTY Right 10/23/2019    Procedure: TOTAL KNEE ARTHROPLASTY RIGHT;  Surgeon: Luis Harkins MD;  Location: [facility];  Service: Orthopedics   • VASECTOMY  01/01/1992     Urology      Family History   Problem Relation Age of Onset   • Breast cancer Mother    • Pancreatic cancer Mother    • Stomach cancer Maternal Grandmother    • Heart failure Father      Social History     Socioeconomic History   • Marital status:    Tobacco Use   • Smoking  status: Never     Passive exposure: Never   • Smokeless tobacco: Never   Vaping Use   • Vaping Use: Never used   Substance and Sexual Activity   • Alcohol use: Yes     Alcohol/week: 14.0 standard drinks     Types: 14 Glasses of wine per week     Comment: 2-3 drinks daily   • Drug use: No   • Sexual activity: Defer      Current Outpatient Medications on File Prior to Visit   Medication Sig Dispense Refill   • amLODIPine (NORVASC) 10 MG tablet Take 1 tablet by mouth Daily.     • clobetasol (TEMOVATE) 0.05 % gel      • docusate sodium (COLACE) 100 MG capsule Take 1 capsule by mouth 2 (Two) Times a Day As Needed for Constipation. 30 capsule 0   • HYDROcodone-acetaminophen (NORCO) 5-325 MG per tablet      • HYDROcodone-acetaminophen (NORCO) 5-325 MG per tablet Take 1 tablet by mouth Every 4 (Four) Hours As Needed for Severe Pain. 10 tablet 0   • Lactobacillus (FLORAJEN ACIDOPHILUS PO) Take  by mouth.     • multivitamin (THERAGRAN) tablet tablet Take 1 tablet by mouth Daily.     • pramipexole (MIRAPEX) 0.5 MG tablet Take 1.5 tablets by mouth.     • rOPINIRole (REQUIP) 1 MG tablet      • saccharomyces boulardii (FLORASTOR) 250 MG capsule Take 1 capsule by mouth 2 (Two) Times a Day for 10 days. 20 capsule 0   • sertraline (ZOLOFT) 50 MG tablet Take 1.5 tablets by mouth Daily.     • telmisartan (MICARDIS) 80 MG tablet Take 1 tablet by mouth Daily.     • [DISCONTINUED] amoxicillin-clavulanate (Augmentin) 500-125 MG per tablet Take 1 tablet by mouth 3 (Three) Times a Day for 10 days. 30 tablet 0   • chlorthalidone (HYGROTON) 25 MG tablet      • magnesium gluconate (MAGONATE) 500 MG tablet Take 1 tablet by mouth Daily.     • meloxicam (MOBIC) 15 MG tablet 1 PO Daily with food. 90 tablet 1     No current facility-administered medications on file prior to visit.      Allergies   Allergen Reactions   • Crestor [Rosuvastatin Calcium] Other (See Comments) and Myalgia     Liver failure   • Lipitor [Atorvastatin] Other (See Comments)  "and Myalgia     Liver failure   • Lisinopril Other (See Comments)     Liver failure   • Zetia [Ezetimibe] Other (See Comments)     Liver failure   • Adhesive Tape Other (See Comments)     Tearing of the skin        Review of Systems   Constitutional: Negative.    HENT: Negative.    Eyes: Negative.    Respiratory: Negative.    Cardiovascular: Negative.    Gastrointestinal: Negative.    Endocrine: Negative.    Genitourinary: Negative.    Musculoskeletal: Positive for arthralgias.   Skin: Negative.    Allergic/Immunologic: Negative.    Neurological: Negative.    Hematological: Negative.    Psychiatric/Behavioral: Negative.         The patient's Review of Systems was personally reviewed and confirmed as accurate.    Physical Exam  Blood pressure 128/80, height 157.5 cm (62.01\"), weight 67.1 kg (147 lb 14.9 oz).    Body mass index is 27.05 kg/m².    GENERAL APPEARANCE: awake, alert, oriented, in no acute distress and well developed, well nourished  LUNGS:  breathing nonlabored  EXTREMITIES: no clubbing, cyanosis  PERIPHERAL PULSES: palpable dorsalis pedis and posterior tibial pulses bilaterally.    GAIT:  Normal        ----------  Right Knee Exam:  ----------  ALIGNMENT: neutral  ----------  RANGE OF MOTION:  Normal (0-100 degrees) with no extensor lag or flexion contracture  LIGAMENTOUS STABILITY:   stable to varus and valgus stress at terminal extension and 30 degrees without any evidence of laxity  ----------  STRENGTH:  KNEE FLEXION 5/5  KNEE EXTENSION  5/5  ANKLE DORSIFLEXION  5/5  ANKLE PLANTARFLEXION  5/5  ----------  PAIN WITH PALPATION: Anterior knee  KNEE EFFUSION: no  PAIN WITH KNEE ROM:  Anteriorly  PATELLAR CREPITUS:  no  ----------  SENSATION TO LIGHT TOUCH:  DEEP PERONEAL/SUPERFICIAL PERONEAL/SURAL/SAPHENOUS/TIBIAL:    intact  ----------  EDEMA:  no  ERYTHEMA:    no  WOUNDS/INCISIONS:   Traumatic laceration overlying anterior lateral aspect of the knee is well approximated with sutures in place.  " Subcutaneous hematoma is identified with no purulent drainage.  _____________________________________________________________________  _____________________________________________________________________    RADIOGRAPHIC FINDINGS:   No new imaging today    Assessment/Plan:   Diagnosis Plan   1. History of right knee joint replacement        2. Laceration of right knee, initial encounter          Patient's traumatic laceration appears to be healing well.  I recommend continued local wound care.  Is too early to proceed to suture removal.  I instructed him to continue working on local wound care.  I will refill his Augmentin for another 10 days to decrease his risk of potential infection given the location relative to his total knee.  He may work on gentle range of motion.  I will see him back in 10 to 14 days for repeat evaluation.  At that time we will likely remove the suture.      Luis Harkins MD  03/14/23  14:23 EDT

## 2023-03-28 ENCOUNTER — OFFICE VISIT (OUTPATIENT)
Dept: ORTHOPEDIC SURGERY | Facility: CLINIC | Age: 73
End: 2023-03-28
Payer: MEDICARE

## 2023-03-28 VITALS
DIASTOLIC BLOOD PRESSURE: 64 MMHG | HEIGHT: 62 IN | SYSTOLIC BLOOD PRESSURE: 120 MMHG | WEIGHT: 147 LBS | BODY MASS INDEX: 27.05 KG/M2

## 2023-03-28 DIAGNOSIS — S81.011D LACERATION OF RIGHT KNEE, SUBSEQUENT ENCOUNTER: Primary | ICD-10-CM

## 2023-03-28 PROCEDURE — 3078F DIAST BP <80 MM HG: CPT | Performed by: ORTHOPAEDIC SURGERY

## 2023-03-28 PROCEDURE — 1159F MED LIST DOCD IN RCRD: CPT | Performed by: ORTHOPAEDIC SURGERY

## 2023-03-28 PROCEDURE — 99212 OFFICE O/P EST SF 10 MIN: CPT | Performed by: ORTHOPAEDIC SURGERY

## 2023-03-28 PROCEDURE — 1160F RVW MEDS BY RX/DR IN RCRD: CPT | Performed by: ORTHOPAEDIC SURGERY

## 2023-03-28 PROCEDURE — 3074F SYST BP LT 130 MM HG: CPT | Performed by: ORTHOPAEDIC SURGERY

## 2023-03-28 NOTE — PROGRESS NOTES
Orthopaedic Clinic Note: Knee Established Patient    Chief Complaint   Patient presents with   • Follow-up     2 week follow up - History of right knee joint replacement; Laceration of right knee        HPI    It has been 2  week(s) since Mr. Levine's last visit. He returns to clinic today for follow-up right knee laceration.  He is 3 weeks out from injury.  He has completed his antibiotic regimen.  Denies fevers chills or constitutional symptoms.  Overall he is happy with his improvement.    Past Medical History:   Diagnosis Date   • Acid reflux    • Anxiety    • Arthritis    • Hearing loss     WEAR AIDS   • Hyperlipidemia    • Hypertension    • Liver failure, acute     RELATED TO STATIN USE; RESOLVED.   • MVP (mitral valve prolapse)    • Psoriasis    • RLS (restless legs syndrome)    • Sclerosing mesenteritis (HCC)    • Sleep apnea     WILL BRING MASK, TUBING, AND SETTINGS   • Wears glasses       Past Surgical History:   Procedure Laterality Date   • COLONOSCOPY  [place] 2019   • ENDOSCOPY  09/29/2017    Dr. Medrano   • HERNIA REPAIR     • LIPOMA EXCISION Right     SHOULDER   • LIVER BIOPSY  02/20/2012   • MOUTH BIOPSY  05/25/2011   • NEUROMA SURGERY Right     KNEE   • SKIN BIOPSY  2018    Dr. Héctor Bernal   • TOTAL KNEE ARTHROPLASTY Right 10/23/2019    Procedure: TOTAL KNEE ARTHROPLASTY RIGHT;  Surgeon: Luis Harkins MD;  Location: Replaced by [facility];  Service: Orthopedics   • VASECTOMY  01/01/1992     Urology      Family History   Problem Relation Age of Onset   • Breast cancer Mother    • Pancreatic cancer Mother    • Stomach cancer Maternal Grandmother    • Heart failure Father      Social History     Socioeconomic History   • Marital status:    Tobacco Use   • Smoking status: Never     Passive exposure: Never   • Smokeless tobacco: Never   Vaping Use   • Vaping Use: Never used   Substance and Sexual Activity   • Alcohol use: Yes     Alcohol/week: 14.0 standard drinks     Types: 14 Glasses of wine per week      Comment: 2-3 drinks daily   • Drug use: No   • Sexual activity: Defer      Current Outpatient Medications on File Prior to Visit   Medication Sig Dispense Refill   • amLODIPine (NORVASC) 10 MG tablet Take 1 tablet by mouth Daily.     • chlorthalidone (HYGROTON) 25 MG tablet      • clobetasol (TEMOVATE) 0.05 % gel      • docusate sodium (COLACE) 100 MG capsule Take 1 capsule by mouth 2 (Two) Times a Day As Needed for Constipation. 30 capsule 0   • Lactobacillus (FLORAJEN ACIDOPHILUS PO) Take  by mouth.     • magnesium gluconate (MAGONATE) 500 MG tablet Take 1 tablet by mouth Daily.     • multivitamin (THERAGRAN) tablet tablet Take 1 tablet by mouth Daily.     • pramipexole (MIRAPEX) 0.5 MG tablet Take 1.5 tablets by mouth.     • rOPINIRole (REQUIP) 1 MG tablet      • sertraline (ZOLOFT) 50 MG tablet Take 1.5 tablets by mouth Daily.     • telmisartan (MICARDIS) 80 MG tablet Take 1 tablet by mouth Daily.     • [DISCONTINUED] HYDROcodone-acetaminophen (NORCO) 5-325 MG per tablet      • [DISCONTINUED] HYDROcodone-acetaminophen (NORCO) 5-325 MG per tablet Take 1 tablet by mouth Every 4 (Four) Hours As Needed for Severe Pain. 10 tablet 0   • [DISCONTINUED] meloxicam (MOBIC) 15 MG tablet 1 PO Daily with food. 90 tablet 1     No current facility-administered medications on file prior to visit.      Allergies   Allergen Reactions   • Crestor [Rosuvastatin Calcium] Other (See Comments) and Myalgia     Liver failure   • Lipitor [Atorvastatin] Other (See Comments) and Myalgia     Liver failure   • Lisinopril Other (See Comments)     Liver failure   • Zetia [Ezetimibe] Other (See Comments)     Liver failure   • Adhesive Tape Other (See Comments)     Tearing of the skin        Review of Systems   Constitutional: Negative.    HENT: Negative.    Eyes: Negative.    Respiratory: Negative.    Cardiovascular: Negative.    Gastrointestinal: Negative.    Endocrine: Negative.    Genitourinary: Negative.    Musculoskeletal: Positive for  "arthralgias.   Skin: Negative.    Allergic/Immunologic: Negative.    Neurological: Negative.    Hematological: Negative.    Psychiatric/Behavioral: Negative.         The patient's Review of Systems was personally reviewed and confirmed as accurate.    Physical Exam  Blood pressure 120/64, height 157.5 cm (62.01\"), weight 66.7 kg (147 lb).    Body mass index is 26.88 kg/m².    GENERAL APPEARANCE: awake, alert, oriented, in no acute distress and well developed, well nourished  LUNGS:  breathing nonlabored  EXTREMITIES: no clubbing, cyanosis  PERIPHERAL PULSES: palpable dorsalis pedis and posterior tibial pulses bilaterally.    GAIT:  Normal        ----------  Right Knee Exam:  ----------  ALIGNMENT: neutral  ----------  RANGE OF MOTION:  Normal (0-120 degrees) with no extensor lag or flexion contracture  LIGAMENTOUS STABILITY:   stable to varus and valgus stress at terminal extension and 30 degrees without any evidence of laxity  ----------  STRENGTH:  KNEE FLEXION 5/5  KNEE EXTENSION  5/5  ANKLE DORSIFLEXION  5/5  ANKLE PLANTARFLEXION  5/5  ----------  PAIN WITH PALPATION:anterior knee  KNEE EFFUSION: no  PAIN WITH KNEE ROM: no  PATELLAR CREPITUS:  no  ----------  SENSATION TO LIGHT TOUCH:  DEEP PERONEAL/SUPERFICIAL PERONEAL/SURAL/SAPHENOUS/TIBIAL:    intact  ----------  EDEMA:  no  ERYTHEMA:    no  WOUNDS/INCISIONS:   yes, well-healed anterior laceration with sutures in place  _____________________________________________________________________  _____________________________________________________________________    RADIOGRAPHIC FINDINGS:   No new imaging today    Assessment/Plan:   Diagnosis Plan   1. Laceration of right knee, initial encounter          Wound is healing well.  There is no evidence of involvement of the joint or concern for injury to the joint mechanics.  We will discontinue sutures today.  Steri-Strips applied.  He will follow-up in 3 weeks for repeat evaluation.      Luis Harkins, " MD  03/28/23  13:21 EDT

## 2023-04-18 ENCOUNTER — OFFICE VISIT (OUTPATIENT)
Dept: ORTHOPEDIC SURGERY | Facility: CLINIC | Age: 73
End: 2023-04-18
Payer: MEDICARE

## 2023-04-18 VITALS
SYSTOLIC BLOOD PRESSURE: 134 MMHG | DIASTOLIC BLOOD PRESSURE: 82 MMHG | WEIGHT: 147 LBS | BODY MASS INDEX: 27.05 KG/M2 | HEIGHT: 62 IN

## 2023-04-18 DIAGNOSIS — Z96.651 HISTORY OF RIGHT KNEE JOINT REPLACEMENT: ICD-10-CM

## 2023-04-18 DIAGNOSIS — S81.011D LACERATION OF RIGHT KNEE, SUBSEQUENT ENCOUNTER: Primary | ICD-10-CM

## 2023-04-18 NOTE — PROGRESS NOTES
Orthopaedic Clinic Note: Knee Established Patient    Chief Complaint   Patient presents with   • Follow-up     3 week recheck - Laceration of right knee-History of right knee joint replacement 10/23/19        HPI    It has been 3  week(s) since Mr. Levine's last visit. He returns to clinic today for follow-up laceration of right knee.  He is here today for wound check.  Rates pain 1/10 on the pain scale primarily due to stiffness.  Denies fevers chills or constitutional symptoms.  He has completed antibiotic regimen.  Overall he is doing better.    Past Medical History:   Diagnosis Date   • Acid reflux    • Anxiety    • Arthritis    • Fracture, clavicle 10/11/2022    Healed   • Hearing loss     WEAR AIDS   • Hyperlipidemia    • Hypertension    • Knee swelling 1/1/2012   • Liver failure, acute     RELATED TO STATIN USE; RESOLVED.   • MVP (mitral valve prolapse)    • Periarthritis of shoulder 1/1/2017   • Psoriasis    • RLS (restless legs syndrome)    • Sclerosing mesenteritis    • Sleep apnea     WILL BRING MASK, TUBING, AND SETTINGS   • Tendinitis of knee 3-1-2023   • Wears glasses       Past Surgical History:   Procedure Laterality Date   • COLONOSCOPY  [facility] 2019   • ENDOSCOPY  09/29/2017    Dr. Medrano   • HERNIA REPAIR     • JOINT REPLACEMENT  Oct 219    Right knee   • LIPOMA EXCISION Right     SHOULDER   • LIVER BIOPSY  02/20/2012   • MOUTH BIOPSY  05/25/2011   • NEUROMA SURGERY Right     KNEE   • SKIN BIOPSY  2018    Dr. Héctor Bernal   • TOTAL KNEE ARTHROPLASTY Right 10/23/2019    Procedure: TOTAL KNEE ARTHROPLASTY RIGHT;  Surgeon: Luis Harkins MD;  Location: [facility];  Service: Orthopedics   • VASECTOMY  01/01/1992    [facility]      Family History   Problem Relation Age of Onset   • Breast cancer Mother    • Pancreatic cancer Mother    • Stomach cancer Maternal Grandmother    • Heart failure Father      Social History     Socioeconomic History   • Marital status:    Tobacco Use   • Smoking status:  "Never     Passive exposure: Never   • Smokeless tobacco: Never   Vaping Use   • Vaping Use: Never used   Substance and Sexual Activity   • Alcohol use: Yes     Alcohol/week: 14.0 standard drinks     Types: 14 Glasses of wine per week     Comment: 2-3 drinks daily   • Drug use: No   • Sexual activity: Not Currently      Current Outpatient Medications on File Prior to Visit   Medication Sig Dispense Refill   • amLODIPine (NORVASC) 10 MG tablet Take 1 tablet by mouth Daily.     • chlorthalidone (HYGROTON) 25 MG tablet      • clobetasol (TEMOVATE) 0.05 % gel      • Lactobacillus (FLORAJEN ACIDOPHILUS PO) Take  by mouth.     • magnesium gluconate (MAGONATE) 500 MG tablet Take 1 tablet by mouth Daily.     • multivitamin (THERAGRAN) tablet tablet Take 1 tablet by mouth Daily.     • pramipexole (MIRAPEX) 0.5 MG tablet Take 1.5 tablets by mouth.     • rOPINIRole (REQUIP) 1 MG tablet      • sertraline (ZOLOFT) 50 MG tablet Take 1.5 tablets by mouth Daily.     • telmisartan (MICARDIS) 80 MG tablet Take 1 tablet by mouth Daily.     • [DISCONTINUED] docusate sodium (COLACE) 100 MG capsule Take 1 capsule by mouth 2 (Two) Times a Day As Needed for Constipation. 30 capsule 0     No current facility-administered medications on file prior to visit.      Allergies   Allergen Reactions   • Crestor [Rosuvastatin Calcium] Other (See Comments) and Myalgia     Liver failure   • Lipitor [Atorvastatin] Other (See Comments) and Myalgia     Liver failure   • Lisinopril Other (See Comments)     Liver failure   • Zetia [Ezetimibe] Other (See Comments)     Liver failure   • Adhesive Tape Other (See Comments)     Tearing of the skin        Review of Systems   Musculoskeletal: Positive for arthralgias and joint swelling.        The patient's Review of Systems was personally reviewed and confirmed as accurate.    Physical Exam  Blood pressure 134/82, height 157.5 cm (62.01\"), weight 66.7 kg (147 lb).    Body mass index is 26.88 kg/m².    GENERAL " APPEARANCE: awake, alert, oriented, in no acute distress and well developed, well nourished  LUNGS:  breathing nonlabored  EXTREMITIES: no clubbing, cyanosis  PERIPHERAL PULSES: palpable dorsalis pedis and posterior tibial pulses bilaterally.    GAIT:  Normal        ----------  Right Knee Exam:  ----------  ALIGNMENT: neutral  ----------  RANGE OF MOTION:  Normal (0-120 degrees) with no extensor lag or flexion contracture  LIGAMENTOUS STABILITY:   stable to varus and valgus stress at terminal extension and 30 degrees without any evidence of laxity  ----------  STRENGTH:  KNEE FLEXION 5/5  KNEE EXTENSION  5/5  ANKLE DORSIFLEXION  5/5  ANKLE PLANTARFLEXION  5/5  ----------  PAIN WITH PALPATION:anterior knee  KNEE EFFUSION: no  PAIN WITH KNEE ROM: no  PATELLAR CREPITUS:  no  ----------  SENSATION TO LIGHT TOUCH:  DEEP PERONEAL/SUPERFICIAL PERONEAL/SURAL/SAPHENOUS/TIBIAL:    intact  ----------  EDEMA:  no  ERYTHEMA:    no  WOUNDS/INCISIONS:   yes, well-healed laceration with no erythema or drainage  _____________________________________________________________________  _____________________________________________________________________    RADIOGRAPHIC FINDINGS:   No new imaging today    Assessment/Plan:   Diagnosis Plan   1. Laceration of right knee, subsequent encounter        2. History of right knee joint replacement          Patient's wound is adequately healed with no signs of infection.  He may resume activity as tolerated without restrictions.  I will see him back in 5 years for repeat evaluation.  He is welcome follow-up sooner should problems arise      Luis Harkins MD  04/18/23  13:34 EDT

## 2023-12-26 ENCOUNTER — TELEPHONE (OUTPATIENT)
Dept: ORTHOPEDIC SURGERY | Facility: CLINIC | Age: 73
End: 2023-12-26
Payer: MEDICARE

## 2023-12-26 RX ORDER — MELOXICAM 15 MG/1
TABLET ORAL
Qty: 90 TABLET | Refills: 0 | Status: SHIPPED | OUTPATIENT
Start: 2023-12-26

## 2023-12-26 RX ORDER — MELOXICAM 15 MG/1
TABLET ORAL
Qty: 90 TABLET | Refills: 0 | Status: SHIPPED | OUTPATIENT
Start: 2023-12-26 | End: 2023-12-26

## 2023-12-26 NOTE — TELEPHONE ENCOUNTER
Refill was provided today; however, if patient to remain on this medication long-term/chronically then further refills will have to be prescribed by PCP so that appropriate kidney/GI monitoring can be completed.

## 2024-04-15 RX ORDER — MELOXICAM 15 MG/1
TABLET ORAL
Qty: 90 TABLET | Refills: 0 | OUTPATIENT
Start: 2024-04-15

## 2024-04-15 NOTE — TELEPHONE ENCOUNTER
If patient to remain on this medication long-term/chronically then refills will have to be prescribed by PCP so that appropriate lab monitoring can be completed.

## 2024-06-12 RX ORDER — MELOXICAM 15 MG/1
TABLET ORAL
Qty: 90 TABLET | Refills: 0 | OUTPATIENT
Start: 2024-06-12

## 2024-10-24 ENCOUNTER — HOSPITAL ENCOUNTER (OUTPATIENT)
Dept: GENERAL RADIOLOGY | Facility: HOSPITAL | Age: 74
Discharge: HOME | End: 2024-10-24
Admitting: ORTHOPAEDIC SURGERY
Payer: MEDICARE

## 2024-10-24 ENCOUNTER — PRE-ADMISSION TESTING (OUTPATIENT)
Dept: PREADMISSION TESTING | Facility: HOSPITAL | Age: 74
End: 2024-10-24
Payer: MEDICARE

## 2024-10-24 VITALS — BODY MASS INDEX: 26.74 KG/M2 | HEIGHT: 63 IN | WEIGHT: 150.91 LBS

## 2024-10-24 LAB
ANION GAP SERPL CALCULATED.3IONS-SCNC: 10 MMOL/L (ref 5–15)
BUN SERPL-MCNC: 17 MG/DL (ref 8–23)
BUN/CREAT SERPL: 22.4 (ref 7–25)
CALCIUM SPEC-SCNC: 10.4 MG/DL (ref 8.6–10.5)
CHLORIDE SERPL-SCNC: 95 MMOL/L (ref 98–107)
CO2 SERPL-SCNC: 26 MMOL/L (ref 22–29)
CREAT SERPL-MCNC: 0.76 MG/DL (ref 0.76–1.27)
DEPRECATED RDW RBC AUTO: 43.5 FL (ref 37–54)
EGFRCR SERPLBLD CKD-EPI 2021: 93.2 ML/MIN/1.73
ERYTHROCYTE [DISTWIDTH] IN BLOOD BY AUTOMATED COUNT: 13.4 % (ref 12.3–15.4)
GLUCOSE SERPL-MCNC: 100 MG/DL (ref 65–99)
HBA1C MFR BLD: 5.2 % (ref 4.8–5.6)
HCT VFR BLD AUTO: 44.2 % (ref 37.5–51)
HGB BLD-MCNC: 14.7 G/DL (ref 13–17.7)
MCH RBC QN AUTO: 29.6 PG (ref 26.6–33)
MCHC RBC AUTO-ENTMCNC: 33.3 G/DL (ref 31.5–35.7)
MCV RBC AUTO: 89.1 FL (ref 79–97)
PLATELET # BLD AUTO: 187 10*3/MM3 (ref 140–450)
PMV BLD AUTO: 9 FL (ref 6–12)
POTASSIUM SERPL-SCNC: 3.9 MMOL/L (ref 3.5–5.2)
QT INTERVAL: 450 MS
QTC INTERVAL: 406 MS
RBC # BLD AUTO: 4.96 10*6/MM3 (ref 4.14–5.8)
SODIUM SERPL-SCNC: 131 MMOL/L (ref 136–145)
WBC NRBC COR # BLD AUTO: 7.12 10*3/MM3 (ref 3.4–10.8)

## 2024-10-24 PROCEDURE — 83036 HEMOGLOBIN GLYCOSYLATED A1C: CPT

## 2024-10-24 PROCEDURE — 36415 COLL VENOUS BLD VENIPUNCTURE: CPT

## 2024-10-24 PROCEDURE — 93010 ELECTROCARDIOGRAM REPORT: CPT | Performed by: INTERNAL MEDICINE

## 2024-10-24 PROCEDURE — 80048 BASIC METABOLIC PNL TOTAL CA: CPT

## 2024-10-24 PROCEDURE — 85027 COMPLETE CBC AUTOMATED: CPT

## 2024-10-24 PROCEDURE — 93005 ELECTROCARDIOGRAM TRACING: CPT

## 2024-10-24 PROCEDURE — 71046 X-RAY EXAM CHEST 2 VIEWS: CPT

## 2024-10-24 RX ORDER — ASPIRIN 81 MG/1
81 TABLET, CHEWABLE ORAL DAILY
COMMUNITY

## 2024-10-24 RX ORDER — UBIDECARENONE 100 MG
100 CAPSULE ORAL DAILY
COMMUNITY

## 2024-10-24 NOTE — PAT
Patient did not review general PAT education video as instructed in their preoperative information received from their surgeon.  One-on-one Pre Admission Testing general education provided during PAT visit.  Copies of PAT general education handouts (Incentive Spirometry, Meds to Beds Program, Patient Belongings, Pre-op skin preparation instructions, Blood Glucose testing, Visitor policy, Surgery FAQ, Code H) distributed to patient. Encouraged patient/family to read PAT general education handouts thoroughly and notify PAT staff with any questions or concerns. Patient instructed to bring PAT pass and completed skin prep sheet (if applicable) on the day of procedure. Patient verbalized understanding of all information and priority content.     Patient's surgeon called in a prescription for Benzol Peroxide 5% wash to [facility].  Patient instructed to  from [facility] pharmacy that was submitted electronically.  Verbal and written instructions given regarding proper use of the Benzoyl Peroxide wash were provided to patient and/or famlily during PAT visit. Patient/family also instructed to complete Benzol Peroxide checklist and return it to Pre-op on the day of surgery.  Patient and/or family verbalized understanding.      Additionally, reinforced with patient to acquire this prescription from the [facility] retail pharmacy before leaving the hospital after PAT visit due to the potential unavailability at local pharmacies.    Patient instructed to drink 20 ounces of Gatorade or Gatorlyte (if diabetic) and it needs to be completed 1 hour (for Main OR patients) or 2 hours (scheduled  section & [facility] patients) before given arrival time for procedure (NO RED Gatorade and NO Gatorade Zero).    Patient verbalized understanding.    Per Anesthesia Request, patient instructed not to take their ACE/ARB medications on the AM of surgery.    Patient directed to Radiology Department for CXR after Pre Admission Testing  Appointment.     Notified Wendy, surgery scheduler, to verify case request matches written consent order.   She will update.

## 2024-11-03 ENCOUNTER — ANESTHESIA EVENT (OUTPATIENT)
Dept: PERIOP | Facility: HOSPITAL | Age: 74
End: 2024-11-03
Payer: MEDICARE

## 2024-11-03 RX ORDER — SODIUM CHLORIDE 0.9 % (FLUSH) 0.9 %
10 SYRINGE (ML) INJECTION EVERY 12 HOURS SCHEDULED
Status: CANCELLED | OUTPATIENT
Start: 2024-11-03

## 2024-11-03 RX ORDER — FAMOTIDINE 10 MG/ML
20 INJECTION, SOLUTION INTRAVENOUS ONCE
Status: CANCELLED | OUTPATIENT
Start: 2024-11-03 | End: 2024-11-03

## 2024-11-03 RX ORDER — SODIUM CHLORIDE 0.9 % (FLUSH) 0.9 %
10 SYRINGE (ML) INJECTION AS NEEDED
Status: CANCELLED | OUTPATIENT
Start: 2024-11-03

## 2024-11-04 ENCOUNTER — ANESTHESIA EVENT CONVERTED (OUTPATIENT)
Dept: ANESTHESIOLOGY | Facility: HOSPITAL | Age: 74
End: 2024-11-04
Payer: MEDICARE

## 2024-11-04 ENCOUNTER — APPOINTMENT (OUTPATIENT)
Dept: GENERAL RADIOLOGY | Facility: HOSPITAL | Age: 74
End: 2024-11-04
Payer: MEDICARE

## 2024-11-04 ENCOUNTER — ANESTHESIA (OUTPATIENT)
Dept: PERIOP | Facility: HOSPITAL | Age: 74
End: 2024-11-04
Payer: MEDICARE

## 2024-11-04 ENCOUNTER — HOSPITAL ENCOUNTER (OUTPATIENT)
Facility: HOSPITAL | Age: 74
Setting detail: HOSPITAL OUTPATIENT SURGERY
Discharge: HOME | End: 2024-11-04
Attending: ORTHOPAEDIC SURGERY | Admitting: ORTHOPAEDIC SURGERY
Payer: MEDICARE

## 2024-11-04 VITALS
HEART RATE: 76 BPM | TEMPERATURE: 97.6 F | RESPIRATION RATE: 16 BRPM | OXYGEN SATURATION: 92 % | SYSTOLIC BLOOD PRESSURE: 115 MMHG | DIASTOLIC BLOOD PRESSURE: 70 MMHG

## 2024-11-04 PROCEDURE — C1713 ANCHOR/SCREW BN/BN,TIS/BN: HCPCS | Performed by: ORTHOPAEDIC SURGERY

## 2024-11-04 PROCEDURE — C1776 JOINT DEVICE (IMPLANTABLE): HCPCS | Performed by: ORTHOPAEDIC SURGERY

## 2024-11-04 PROCEDURE — 73030 X-RAY EXAM OF SHOULDER: CPT

## 2024-11-04 PROCEDURE — P9041 ALBUMIN (HUMAN),5%, 50ML: HCPCS

## 2024-11-04 PROCEDURE — 97116 GAIT TRAINING THERAPY: CPT

## 2024-11-04 PROCEDURE — 25810000003 SODIUM CHLORIDE 0.9 % SOLUTION 250 ML FLEX CONT: Performed by: ORTHOPAEDIC SURGERY

## 2024-11-04 PROCEDURE — 97535 SELF CARE MNGMENT TRAINING: CPT | Performed by: OCCUPATIONAL THERAPIST

## 2024-11-04 PROCEDURE — 25010000002 LIDOCAINE PF 1% 1 % SOLUTION: Performed by: NURSE ANESTHETIST, CERTIFIED REGISTERED

## 2024-11-04 PROCEDURE — 97551 CAREGIVER TRAING EA ADDL 15: CPT | Performed by: OCCUPATIONAL THERAPIST

## 2024-11-04 PROCEDURE — 25010000002 DEXAMETHASONE SODIUM PHOSPHATE 10 MG/ML SOLUTION: Performed by: ANESTHESIOLOGY

## 2024-11-04 PROCEDURE — 25010000002 GLYCOPYRROLATE 1 MG/5ML SOLUTION: Performed by: NURSE ANESTHETIST, CERTIFIED REGISTERED

## 2024-11-04 PROCEDURE — 25010000002 GLYCOPYRROLATE 1 MG/5ML SOLUTION

## 2024-11-04 PROCEDURE — 25010000002 PHENYLEPHRINE 10 MG/ML SOLUTION 1 ML VIAL: Performed by: NURSE ANESTHETIST, CERTIFIED REGISTERED

## 2024-11-04 PROCEDURE — 97161 PT EVAL LOW COMPLEX 20 MIN: CPT

## 2024-11-04 PROCEDURE — 25010000002 PROPOFOL 10 MG/ML EMULSION: Performed by: NURSE ANESTHETIST, CERTIFIED REGISTERED

## 2024-11-04 PROCEDURE — 97550 CAREGIVER TRAING 1ST 30 MIN: CPT | Performed by: OCCUPATIONAL THERAPIST

## 2024-11-04 PROCEDURE — 25010000002 SUGAMMADEX 200 MG/2ML SOLUTION: Performed by: NURSE ANESTHETIST, CERTIFIED REGISTERED

## 2024-11-04 PROCEDURE — 25010000002 BUPIVACAINE (PF) 0.25 % SOLUTION: Performed by: NURSE ANESTHETIST, CERTIFIED REGISTERED

## 2024-11-04 PROCEDURE — 25010000002 ROPIVACAINE HCL-NACL 0.2-0.9 % SOLUTION: Performed by: NURSE ANESTHETIST, CERTIFIED REGISTERED

## 2024-11-04 PROCEDURE — 25010000002 ALBUMIN HUMAN 5% PER 50 ML

## 2024-11-04 PROCEDURE — 25010000002 VANCOMYCIN 1 G RECONSTITUTED SOLUTION 1 EACH VIAL: Performed by: ORTHOPAEDIC SURGERY

## 2024-11-04 PROCEDURE — 25010000002 LIDOCAINE PF 1% 1 % SOLUTION: Performed by: ANESTHESIOLOGY

## 2024-11-04 PROCEDURE — 25010000002 DEXAMETHASONE PER 1 MG: Performed by: NURSE ANESTHETIST, CERTIFIED REGISTERED

## 2024-11-04 PROCEDURE — 97110 THERAPEUTIC EXERCISES: CPT | Performed by: OCCUPATIONAL THERAPIST

## 2024-11-04 PROCEDURE — 25010000002 FENTANYL CITRATE (PF) 50 MCG/ML SOLUTION: Performed by: NURSE ANESTHETIST, CERTIFIED REGISTERED

## 2024-11-04 PROCEDURE — 97165 OT EVAL LOW COMPLEX 30 MIN: CPT | Performed by: OCCUPATIONAL THERAPIST

## 2024-11-04 PROCEDURE — 25010000002 CEFAZOLIN PER 500 MG: Performed by: ORTHOPAEDIC SURGERY

## 2024-11-04 PROCEDURE — 25810000003 LACTATED RINGERS PER 1000 ML: Performed by: ANESTHESIOLOGY

## 2024-11-04 PROCEDURE — 97530 THERAPEUTIC ACTIVITIES: CPT | Performed by: OCCUPATIONAL THERAPIST

## 2024-11-04 PROCEDURE — 25010000002 ONDANSETRON PER 1 MG: Performed by: NURSE ANESTHETIST, CERTIFIED REGISTERED

## 2024-11-04 DEVICE — IMPLANTABLE DEVICE
Type: IMPLANTABLE DEVICE | Site: SHOULDER | Status: FUNCTIONAL
Brand: EQUINOXE

## 2024-11-04 DEVICE — HUMERAL LINER, CONSTRAINED
Type: IMPLANTABLE DEVICE | Site: SHOULDER | Status: FUNCTIONAL
Brand: EQUINOXE®

## 2024-11-04 DEVICE — HUMERAL LINER
Type: IMPLANTABLE DEVICE | Site: SHOULDER | Status: FUNCTIONAL
Brand: EQUINOXE®

## 2024-11-04 DEVICE — FW,BPB #2 SUTR,BLU W/NDL
Type: IMPLANTABLE DEVICE | Site: SHOULDER | Status: FUNCTIONAL
Brand: ARTHREX®

## 2024-11-04 DEVICE — IMPLANTABLE DEVICE: Type: IMPLANTABLE DEVICE | Site: SHOULDER | Status: FUNCTIONAL

## 2024-11-04 RX ORDER — ALBUMIN, HUMAN INJ 5% 5 %
500 SOLUTION INTRAVENOUS ONCE
Status: COMPLETED | OUTPATIENT
Start: 2024-11-04 | End: 2024-11-04

## 2024-11-04 RX ORDER — PROMETHAZINE HYDROCHLORIDE 25 MG/1
25 SUPPOSITORY RECTAL ONCE AS NEEDED
Status: DISCONTINUED | OUTPATIENT
Start: 2024-11-04 | End: 2024-11-04 | Stop reason: HOSPADM

## 2024-11-04 RX ORDER — SODIUM CHLORIDE, SODIUM LACTATE, POTASSIUM CHLORIDE, CALCIUM CHLORIDE 600; 310; 30; 20 MG/100ML; MG/100ML; MG/100ML; MG/100ML
9 INJECTION, SOLUTION INTRAVENOUS CONTINUOUS
Status: DISCONTINUED | OUTPATIENT
Start: 2024-11-05 | End: 2024-11-04 | Stop reason: HOSPADM

## 2024-11-04 RX ORDER — SODIUM CHLORIDE 9 MG/ML
9 INJECTION, SOLUTION INTRAVENOUS AS NEEDED
Status: DISCONTINUED | OUTPATIENT
Start: 2024-11-04 | End: 2024-11-04 | Stop reason: HOSPADM

## 2024-11-04 RX ORDER — ONDANSETRON 2 MG/ML
INJECTION INTRAMUSCULAR; INTRAVENOUS AS NEEDED
Status: DISCONTINUED | OUTPATIENT
Start: 2024-11-04 | End: 2024-11-04 | Stop reason: SURG

## 2024-11-04 RX ORDER — SODIUM CHLORIDE, SODIUM LACTATE, POTASSIUM CHLORIDE, CALCIUM CHLORIDE 600; 310; 30; 20 MG/100ML; MG/100ML; MG/100ML; MG/100ML
9 INJECTION, SOLUTION INTRAVENOUS CONTINUOUS
Status: DISCONTINUED | OUTPATIENT
Start: 2024-11-04 | End: 2024-11-04 | Stop reason: HOSPADM

## 2024-11-04 RX ORDER — ALBUMIN, HUMAN INJ 5% 5 %
SOLUTION INTRAVENOUS
Status: COMPLETED
Start: 2024-11-04 | End: 2024-11-04

## 2024-11-04 RX ORDER — GLYCOPYRROLATE 0.2 MG/ML
INJECTION INTRAMUSCULAR; INTRAVENOUS
Status: COMPLETED
Start: 2024-11-04 | End: 2024-11-04

## 2024-11-04 RX ORDER — SODIUM CHLORIDE 0.9 % (FLUSH) 0.9 %
3 SYRINGE (ML) INJECTION EVERY 12 HOURS SCHEDULED
Status: DISCONTINUED | OUTPATIENT
Start: 2024-11-04 | End: 2024-11-04 | Stop reason: HOSPADM

## 2024-11-04 RX ORDER — ROPIVACAINE HYDROCHLORIDE 2 MG/ML
INJECTION, SOLUTION EPIDURAL; INFILTRATION; PERINEURAL CONTINUOUS
Status: DISCONTINUED | OUTPATIENT
Start: 2024-11-04 | End: 2024-11-04 | Stop reason: HOSPADM

## 2024-11-04 RX ORDER — TRANEXAMIC ACID 10 MG/ML
1000 INJECTION, SOLUTION INTRAVENOUS ONCE
Status: DISCONTINUED | OUTPATIENT
Start: 2024-11-04 | End: 2024-11-04 | Stop reason: HOSPADM

## 2024-11-04 RX ORDER — FENTANYL CITRATE 50 UG/ML
50 INJECTION, SOLUTION INTRAMUSCULAR; INTRAVENOUS
Status: DISCONTINUED | OUTPATIENT
Start: 2024-11-04 | End: 2024-11-04 | Stop reason: HOSPADM

## 2024-11-04 RX ORDER — ROCURONIUM BROMIDE 10 MG/ML
INJECTION, SOLUTION INTRAVENOUS AS NEEDED
Status: DISCONTINUED | OUTPATIENT
Start: 2024-11-04 | End: 2024-11-04 | Stop reason: SURG

## 2024-11-04 RX ORDER — DEXAMETHASONE SODIUM PHOSPHATE 10 MG/ML
INJECTION, SOLUTION INTRAMUSCULAR; INTRAVENOUS
Status: DISCONTINUED | OUTPATIENT
Start: 2024-11-04 | End: 2024-11-04 | Stop reason: SURG

## 2024-11-04 RX ORDER — GLYCOPYRROLATE 0.2 MG/ML
INJECTION INTRAMUSCULAR; INTRAVENOUS AS NEEDED
Status: DISCONTINUED | OUTPATIENT
Start: 2024-11-04 | End: 2024-11-04 | Stop reason: SURG

## 2024-11-04 RX ORDER — GLYCOPYRROLATE 0.2 MG/ML
0.2 INJECTION INTRAMUSCULAR; INTRAVENOUS ONCE
Status: COMPLETED | OUTPATIENT
Start: 2024-11-04 | End: 2024-11-04

## 2024-11-04 RX ORDER — BUPIVACAINE HYDROCHLORIDE 2.5 MG/ML
INJECTION, SOLUTION EPIDURAL; INFILTRATION; INTRACAUDAL
Status: COMPLETED | OUTPATIENT
Start: 2024-11-04 | End: 2024-11-04

## 2024-11-04 RX ORDER — EPHEDRINE SULFATE 50 MG/ML
INJECTION INTRAVENOUS AS NEEDED
Status: DISCONTINUED | OUTPATIENT
Start: 2024-11-04 | End: 2024-11-04 | Stop reason: SURG

## 2024-11-04 RX ORDER — ONDANSETRON 2 MG/ML
4 INJECTION INTRAMUSCULAR; INTRAVENOUS ONCE AS NEEDED
Status: DISCONTINUED | OUTPATIENT
Start: 2024-11-04 | End: 2024-11-04 | Stop reason: HOSPADM

## 2024-11-04 RX ORDER — FAMOTIDINE 20 MG/1
20 TABLET, FILM COATED ORAL ONCE
Status: COMPLETED | OUTPATIENT
Start: 2024-11-04 | End: 2024-11-04

## 2024-11-04 RX ORDER — ACETAMINOPHEN 500 MG
1000 TABLET ORAL ONCE
Status: COMPLETED | OUTPATIENT
Start: 2024-11-04 | End: 2024-11-04

## 2024-11-04 RX ORDER — HYDRALAZINE HYDROCHLORIDE 20 MG/ML
5 INJECTION INTRAMUSCULAR; INTRAVENOUS
Status: DISCONTINUED | OUTPATIENT
Start: 2024-11-04 | End: 2024-11-04 | Stop reason: HOSPADM

## 2024-11-04 RX ORDER — FENTANYL CITRATE 50 UG/ML
INJECTION, SOLUTION INTRAMUSCULAR; INTRAVENOUS AS NEEDED
Status: DISCONTINUED | OUTPATIENT
Start: 2024-11-04 | End: 2024-11-04 | Stop reason: SURG

## 2024-11-04 RX ORDER — IPRATROPIUM BROMIDE AND ALBUTEROL SULFATE 2.5; .5 MG/3ML; MG/3ML
3 SOLUTION RESPIRATORY (INHALATION) ONCE AS NEEDED
Status: DISCONTINUED | OUTPATIENT
Start: 2024-11-04 | End: 2024-11-04 | Stop reason: HOSPADM

## 2024-11-04 RX ORDER — DROPERIDOL 2.5 MG/ML
0.62 INJECTION, SOLUTION INTRAMUSCULAR; INTRAVENOUS
Status: DISCONTINUED | OUTPATIENT
Start: 2024-11-04 | End: 2024-11-04 | Stop reason: HOSPADM

## 2024-11-04 RX ORDER — OXYCODONE HYDROCHLORIDE 5 MG/1
TABLET ORAL
Status: COMPLETED
Start: 2024-11-04 | End: 2024-11-04

## 2024-11-04 RX ORDER — TRANEXAMIC ACID 10 MG/ML
1000 INJECTION, SOLUTION INTRAVENOUS ONCE
Status: COMPLETED | OUTPATIENT
Start: 2024-11-04 | End: 2024-11-04

## 2024-11-04 RX ORDER — OXYCODONE HYDROCHLORIDE 5 MG/1
5 TABLET ORAL EVERY 4 HOURS PRN
Qty: 25 TABLET | Refills: 0 | Status: SHIPPED | OUTPATIENT
Start: 2024-11-04

## 2024-11-04 RX ORDER — FENTANYL CITRATE 50 UG/ML
INJECTION, SOLUTION INTRAMUSCULAR; INTRAVENOUS
Status: COMPLETED | OUTPATIENT
Start: 2024-11-04 | End: 2024-11-04

## 2024-11-04 RX ORDER — DROPERIDOL 2.5 MG/ML
0.62 INJECTION, SOLUTION INTRAMUSCULAR; INTRAVENOUS ONCE AS NEEDED
Status: DISCONTINUED | OUTPATIENT
Start: 2024-11-04 | End: 2024-11-04 | Stop reason: HOSPADM

## 2024-11-04 RX ORDER — PROMETHAZINE HYDROCHLORIDE 25 MG/1
25 TABLET ORAL ONCE AS NEEDED
Status: DISCONTINUED | OUTPATIENT
Start: 2024-11-04 | End: 2024-11-04 | Stop reason: HOSPADM

## 2024-11-04 RX ORDER — ASPIRIN 81 MG/1
81 TABLET ORAL DAILY
Status: CANCELLED | OUTPATIENT
Start: 2024-11-05

## 2024-11-04 RX ORDER — OXYCODONE HYDROCHLORIDE 5 MG/1
5 TABLET ORAL EVERY 4 HOURS PRN
Status: DISCONTINUED | OUTPATIENT
Start: 2024-11-04 | End: 2024-11-04 | Stop reason: HOSPADM

## 2024-11-04 RX ORDER — DEXAMETHASONE SODIUM PHOSPHATE 4 MG/ML
INJECTION, SOLUTION INTRA-ARTICULAR; INTRALESIONAL; INTRAMUSCULAR; INTRAVENOUS; SOFT TISSUE AS NEEDED
Status: DISCONTINUED | OUTPATIENT
Start: 2024-11-04 | End: 2024-11-04 | Stop reason: SURG

## 2024-11-04 RX ORDER — LABETALOL HYDROCHLORIDE 5 MG/ML
5 INJECTION, SOLUTION INTRAVENOUS
Status: DISCONTINUED | OUTPATIENT
Start: 2024-11-04 | End: 2024-11-04 | Stop reason: HOSPADM

## 2024-11-04 RX ORDER — ACETAMINOPHEN 325 MG/1
650 TABLET ORAL EVERY 4 HOURS PRN
Status: CANCELLED | OUTPATIENT
Start: 2024-11-04

## 2024-11-04 RX ORDER — MIDAZOLAM HYDROCHLORIDE 1 MG/ML
0.5 INJECTION, SOLUTION INTRAMUSCULAR; INTRAVENOUS
Status: DISCONTINUED | OUTPATIENT
Start: 2024-11-04 | End: 2024-11-04 | Stop reason: HOSPADM

## 2024-11-04 RX ORDER — SODIUM CHLORIDE 0.9 % (FLUSH) 0.9 %
3-10 SYRINGE (ML) INJECTION AS NEEDED
Status: DISCONTINUED | OUTPATIENT
Start: 2024-11-04 | End: 2024-11-04 | Stop reason: HOSPADM

## 2024-11-04 RX ORDER — NALOXONE HCL 0.4 MG/ML
0.4 VIAL (ML) INJECTION AS NEEDED
Status: DISCONTINUED | OUTPATIENT
Start: 2024-11-04 | End: 2024-11-04 | Stop reason: HOSPADM

## 2024-11-04 RX ORDER — NALOXONE HCL 0.4 MG/ML
0.1 VIAL (ML) INJECTION
Status: CANCELLED | OUTPATIENT
Start: 2024-11-04

## 2024-11-04 RX ORDER — LIDOCAINE HYDROCHLORIDE 10 MG/ML
INJECTION, SOLUTION EPIDURAL; INFILTRATION; INTRACAUDAL; PERINEURAL AS NEEDED
Status: DISCONTINUED | OUTPATIENT
Start: 2024-11-04 | End: 2024-11-04 | Stop reason: SURG

## 2024-11-04 RX ORDER — PROPOFOL 10 MG/ML
VIAL (ML) INTRAVENOUS AS NEEDED
Status: DISCONTINUED | OUTPATIENT
Start: 2024-11-04 | End: 2024-11-04 | Stop reason: SURG

## 2024-11-04 RX ORDER — LIDOCAINE HYDROCHLORIDE 10 MG/ML
0.5 INJECTION, SOLUTION EPIDURAL; INFILTRATION; INTRACAUDAL; PERINEURAL ONCE AS NEEDED
Status: COMPLETED | OUTPATIENT
Start: 2024-11-04 | End: 2024-11-04

## 2024-11-04 RX ORDER — BUPIVACAINE HYDROCHLORIDE 2.5 MG/ML
INJECTION, SOLUTION EPIDURAL; INFILTRATION; INTRACAUDAL
Status: DISCONTINUED | OUTPATIENT
Start: 2024-11-04 | End: 2024-11-04 | Stop reason: SURG

## 2024-11-04 RX ADMIN — TRANEXAMIC ACID 1000 MG: 10 INJECTION, SOLUTION INTRAVENOUS at 09:02

## 2024-11-04 RX ADMIN — Medication 1000 MG: at 09:35

## 2024-11-04 RX ADMIN — PROPOFOL 150 MG: 10 INJECTION, EMULSION INTRAVENOUS at 07:38

## 2024-11-04 RX ADMIN — FAMOTIDINE 20 MG: 20 TABLET, FILM COATED ORAL at 06:45

## 2024-11-04 RX ADMIN — PHENYLEPHRINE HYDROCHLORIDE 0.2 MCG/KG/MIN: 10 INJECTION INTRAVENOUS at 07:50

## 2024-11-04 RX ADMIN — EPHEDRINE SULFATE 10 MG: 50 INJECTION INTRAVENOUS at 07:47

## 2024-11-04 RX ADMIN — OXYCODONE HYDROCHLORIDE 5 MG: 5 TABLET ORAL at 12:30

## 2024-11-04 RX ADMIN — LIDOCAINE HYDROCHLORIDE 50 MG: 10 INJECTION, SOLUTION EPIDURAL; INFILTRATION; INTRACAUDAL; PERINEURAL at 07:38

## 2024-11-04 RX ADMIN — SODIUM CHLORIDE, POTASSIUM CHLORIDE, SODIUM LACTATE AND CALCIUM CHLORIDE 9 ML/HR: 600; 310; 30; 20 INJECTION, SOLUTION INTRAVENOUS at 06:40

## 2024-11-04 RX ADMIN — SODIUM CHLORIDE 2000 MG: 900 INJECTION INTRAVENOUS at 06:42

## 2024-11-04 RX ADMIN — GLYCOPYRROLATE 0.2 MCG: 0.2 INJECTION, SOLUTION INTRAMUSCULAR; INTRAVENOUS at 07:49

## 2024-11-04 RX ADMIN — ALBUMIN, HUMAN INJ 5% 500 ML: 5 SOLUTION at 10:49

## 2024-11-04 RX ADMIN — DEXAMETHASONE SODIUM PHOSPHATE 4 MG: 4 INJECTION INTRA-ARTICULAR; INTRALESIONAL; INTRAMUSCULAR; INTRAVENOUS; SOFT TISSUE at 07:50

## 2024-11-04 RX ADMIN — ALBUMIN (HUMAN) 500 ML: 12.5 INJECTION, SOLUTION INTRAVENOUS at 10:49

## 2024-11-04 RX ADMIN — ACETAMINOPHEN 1000 MG: 500 TABLET ORAL at 06:45

## 2024-11-04 RX ADMIN — BUPIVACAINE HYDROCHLORIDE 10 ML: 2.5 INJECTION, SOLUTION EPIDURAL; INFILTRATION; INTRACAUDAL; PERINEURAL at 07:19

## 2024-11-04 RX ADMIN — SUGAMMADEX 200 MG: 100 INJECTION, SOLUTION INTRAVENOUS at 09:17

## 2024-11-04 RX ADMIN — LIDOCAINE HYDROCHLORIDE 0.5 ML: 10 INJECTION, SOLUTION EPIDURAL; INFILTRATION; INTRACAUDAL; PERINEURAL at 06:40

## 2024-11-04 RX ADMIN — BUPIVACAINE HYDROCHLORIDE 30 ML: 2.5 INJECTION, SOLUTION EPIDURAL; INFILTRATION; INTRACAUDAL at 10:42

## 2024-11-04 RX ADMIN — GLYCOPYRROLATE 0.2 MG: 0.2 INJECTION INTRAMUSCULAR; INTRAVENOUS at 10:44

## 2024-11-04 RX ADMIN — ROCURONIUM BROMIDE 50 MG: 10 INJECTION INTRAVENOUS at 07:38

## 2024-11-04 RX ADMIN — VANCOMYCIN HYDROCHLORIDE 1030 MG: 1 INJECTION, POWDER, LYOPHILIZED, FOR SOLUTION INTRAVENOUS at 07:48

## 2024-11-04 RX ADMIN — TRANEXAMIC ACID 1000 MG: 10 INJECTION, SOLUTION INTRAVENOUS at 07:45

## 2024-11-04 RX ADMIN — DEXAMETHASONE SODIUM PHOSPHATE 2 MG: 10 INJECTION, SOLUTION INTRAMUSCULAR; INTRAVENOUS at 10:42

## 2024-11-04 RX ADMIN — FENTANYL CITRATE 100 MCG: 50 INJECTION, SOLUTION INTRAMUSCULAR; INTRAVENOUS at 07:38

## 2024-11-04 RX ADMIN — ONDANSETRON 4 MG: 2 INJECTION INTRAMUSCULAR; INTRAVENOUS at 09:04

## 2024-11-04 RX ADMIN — FENTANYL CITRATE 100 MCG: 50 INJECTION, SOLUTION INTRAMUSCULAR; INTRAVENOUS at 07:19

## 2024-11-04 NOTE — OP NOTE
DATE OF OPERATION: 11/04/24  PREOPERATIVE DIAGNOSIS: Primary osteoarthritis of left shoulder [044654]   POSTOPERATIVE DIAGNOSES:  1. Primary osteoarthritis of left shoulder [746920]   2. Biceps tenosynovitis.    PROCEDURES PERFORMED:  1.  Left reverse total shoulder arthroplasty.    2.  Left biceps tenodesis.    SURGEON: Raj Arteaga MD  ASSISTANTS:  1. Harshad Esquivel MD, PGY-6 Sports Fellow  2. Saleem Cheney MD, PGY-5    ANESTHESIA: General plus block.   SURGICAL APPROACH: Deltopectoral      SURGICAL TECHNIQUE: Tenotomy        ESTIMATED BLOOD LOSS:100mL.    COMPLICATIONS: None.    DISPOSITION: Recovery room in stable condition.    IMPLANTS: Ex[employer] Equinoxe reverse total shoulder system, 6 mm preserve stem press-fit, 0 metal liner tray, 38 x 0 polyethylene tray, standard baseplate with 5 screws with locking caps, and a 38 mm glenosphere.    INDICATIONS: This is a 74-year-old male with left shoulder pain and limited function and motion osteoarthritis. They have failed conservative treatment and after a discussion of risks, benefits, and alternatives, wished to proceed with shoulder arthroplasty.  DESCRIPTION OF PROCEDURE: On the day of surgery, the patient identified the left shoulder as the correct operative extremity. This was initialed by the surgeon with the patients's acknowledgment. The patient underwent placement of an interscalene block and was taken to the operating room and placed in the supine position. Upon induction of adequate anesthesia, the patient was brought up to the beach chair position and the shoulder and upper extremity were prepped and draped in the usual sterile fashion. Timeout confirmed the correct patient and operative extremity as well as that antibiotics were on board. A standard deltopectoral approach to the shoulder was carried out. It was carried sharply through the skin and subcutaneous tissue. Medial and lateral flaps were developed over the deltopectoral fascia. The cephalic vein was  not identified in the deltopectoral interval. The subdeltoid and subpectoral spaces were mobilized and a blunt retractor was placed deep to this. The clavipectoral fascia was opened on the lateral edge of the conjoined tendon and the retractor was moved deep to this. The leading edge of the pectoralis was released exposing the long head of the biceps. This was tenosynovitic. It was tenodesed to the pectoralis and released proximal to this. The 3 sisters were identified and coagulated. A subscapularis tenotomy was performed 1 cm medial to the lesser tuberosity and rotator interval was released to the glenoid exposing the humeral head.  The subscap tissue and all the tissue superficial to this was extremely friable and degenerated.  The inferior capsule was released directly off the humerus to allow greater than 90° of external rotation. The anatomic neck was exposed and the humeral head osteotomy was performed in approximately 25° of retroversion. The remainder of the osteophytes were removed. The canal was then entered, reamed, and broached. The final stem impacted in in approximately 25° of retroversion. A head protector was placed. The humerus was subluxed posteriorly. The glenoid exposed. Circumferential labral excision and capsular release were performed. A 270° mobilization of the subscapularis was carried out as well.  The glenoid was soft with significant cystic changes throughout.  There was clear evidence of medialization.  Given this soft bony surface with multiple cysts as well as the friable soft tissue the decision was made to proceed to a reverse shoulder replacement.  A centering hole was drilled. The glenoid was gently reamed and then the large central hole for the baseplate was drilled and the cage glenoid baseplate impacted in.  Next, the inferior screw hole was drilled and a screw placed with good purchase.  Next, an amada-inferior screw was placed, then a postero-inferior screw, then an anterior  superior screw and then a superior screw placed with acceptable purchase in all.  Locking caps were placed. The glenosphere was then inserted and locked into place with a set screw.  The humerus was carefully subluxed back anteriorly. A liner tray and polyethylene were placed and trialing was carried out. The appropriate final sizes were chosen and locked into place.  The shoulder was then reduced.  This allowed nearly full passive range of motion with no instability. The joint was copiously irrigated with saline mixed with Betadine followed by Irrisept after the final implants were assembled and locked into place.  The subscap was repaired with #2 FiberWire and was stable to approximately 60 degrees.  Passive range range of motion will be full elevation but external rotation will be limited to 30° in the perioperative period. The conjoined was very tight and therefore pie-crust lengthening was performed. The deltopectoral interval was approximated with 0 Vicryl, the subcutaneous tissue with 2-0 Vicryl, and the skin with Monocryl and Dermabond. A sterile dressing was placed. Anesthesia was reversed and the patient was taken to the recovery room in stable condition. All instrument, needle, and sponge counts were correct.      Raj Arteaga MD*

## 2024-11-04 NOTE — PLAN OF CARE
Goal Outcome Evaluation:  Plan of Care Reviewed With: patient           Outcome Evaluation: OT educated pt and family on shoulder precautions, ADL retraining to maintain, sling management and HEP. He tolerated L shoulder PROM , IR chest/ER 30. He passed mobility screen, however recommend PT eval for stair training to access his RV. Recommend DC home with initial 24/7 assist. Issued gait belt for home and educated pt and spouse on use.    Anticipated Discharge Disposition (OT): home with 24/7 care

## 2024-11-04 NOTE — ANESTHESIA POSTPROCEDURE EVALUATION
Patient: Harvey Levine    Procedure Summary       Date: 11/04/24 Room / Location:  [facility]    Anesthesia Start: 0734 Anesthesia Stop: 0935    Procedure: LEFT TOTAL SHOULDER ARTHROPLASTY, REVERSE, BICEPS TENODES (Left: Shoulder) Diagnosis:       Primary osteoarthritis of left shoulder      Left bicipital tenosynovitis      (Primary osteoarthritis of left shoulder [[id number]])    Surgeons: Raj Arteaga MD Provider: Alexander Prasad MD    Anesthesia Type: general with block ASA Status: 3            Anesthesia Type: general with block    Vitals  Vitals Value Taken Time   BP 95/64 11/04/24 0935   Temp 97.3 °F (36.3 °C) 11/04/24 0935   Pulse 68 11/04/24 0935   Resp 14 11/04/24 0935   SpO2 96 % 11/04/24 0935           Post Anesthesia Care and Evaluation    Patient location during evaluation: PACU  Patient participation: waiting for patient participation  Level of consciousness: sleepy but conscious  Pain management: adequate    Airway patency: patent  Anesthetic complications: No anesthetic complications  PONV Status: none  Cardiovascular status: hemodynamically stable and acceptable  Respiratory status: nonlabored ventilation, acceptable and nasal cannula  Hydration status: acceptable

## 2024-11-04 NOTE — ANESTHESIA PROCEDURE NOTES
Peripheral Block    Pre-sedation assessment completed: 11/4/2024 10:37 AM    Patient reassessed immediately prior to procedure    Patient location during procedure: OR  Start time: 11/4/2024 10:38 AM  Stop time: 11/4/2024 10:42 AM  Reason for block: at surgeon's request and post-op pain management  Performed by  CRNA/CAA: Guillermo Weaver, CRNA  Assisted by: Lesley Yao RN  Preanesthetic Checklist  Completed: patient identified, IV checked, site marked, risks and benefits discussed, surgical consent, monitors and equipment checked, pre-op evaluation and timeout performed  Prep:  Pt Position: supine  Sterile barriers:cap, gloves, mask and washed/disinfected hands  Prep: ChloraPrep  Patient monitoring: blood pressure monitoring, continuous pulse oximetry and EKG  Procedure  Performed under: general  Guidance:ultrasound guided and landmark technique  Images:still images obtained, printed/placed on chart    Laterality:left  Block Type:PECS I and PECS II  Injection Technique:single-shot  Needle Type:short-bevel  Needle Gauge:20 G  Resistance on Injection: none    Medications Used: dexamethasone sodium phosphate injection - Injection   2 mg - 11/4/2024 10:42:00 AM  bupivacaine PF (MARCAINE) 0.25 % injection - Injection   30 mL - 11/4/2024 10:42:00 AM      Medications  Preservative Free Saline:10ml  Comment:         Post Assessment  Injection Assessment: negative aspiration for heme, incremental injection and no paresthesia on injection  Patient Tolerance:comfortable throughout block  Complications:no  Additional Notes  Interpectoral-Pectoserratus Plane   A high-frequency linear transducer, with sterile cover, was placed medial to the coracoid process in the paramedian sagittal plane. The transducer was moved caudally to the 4th rib and rotated slightly to allow an in-plane needle trajectory from medial to lateral. Pectoralis Major Muscle (PMM), Pectoralis Minor Muscle (PmM), Thoracoacromial Artery, Ribs, and Pleura  "were identified under ultrasound. The insertion site was prepped in sterile fashion and then localized with 2-5 ml of 1% Lidocaine. Using ultrasound-guidance, a 20-gauge B-Mercado 4\" Ultraplex 360 non-stimulating echogenic needle was advanced in plane until the tip of the needle was in the fascial plane between the PMM and PmM, lateral to the Thoracoacromial Artery. 1-3ml of preservative free normal saline was used to hydro-dissect the fascial planes. After the fascial plane was verified, 10ml local anesthetic (LA) was injected for Interpectoral fascial plane block. The needle was continued along the same path to the level of the 4th rib below PmM.  Initially preservative free normal saline was used to confirm needle position and then 20 ml of LA was injected for Pectoserratus fascial plane block. Aspiration every 5 ml to prevent intravascular injection. Injection was completed with negative aspiration of blood and negative intravascular injection. Injection pressures were normal with minimal resistance.     Performed by: Guillermo Weaver CRNA            "
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
## 2024-11-04 NOTE — ANESTHESIA PREPROCEDURE EVALUATION
Anesthesia Evaluation     Patient summary reviewed and Nursing notes reviewed                Airway   Mallampati: II  TM distance: >3 FB  Neck ROM: full  No difficulty expected  Dental - normal exam     Pulmonary - normal exam   (+) ,sleep apnea  Cardiovascular - normal exam    (+) hypertension, hyperlipidemia    ROS comment:   Echo 10/18: normal EF, mild-mod MR    Neuro/Psych- negative ROS  GI/Hepatic/Renal/Endo    (+) GERD    Musculoskeletal (-) negative ROS    Abdominal  - normal exam    Bowel sounds: normal.   Substance History   (+) alcohol use     OB/GYN negative ob/gyn ROS         Other                      Anesthesia Plan    ASA 3     general with block     intravenous induction     Anesthetic plan, risks, benefits, and alternatives have been provided, discussed and informed consent has been obtained with: patient.    Plan discussed with CRNA.    CODE STATUS:
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
## 2024-11-04 NOTE — PLAN OF CARE
Goal Outcome Evaluation:  Plan of Care Reviewed With: patient        Progress: no change  Outcome Evaluation: Pt navigated steps with SPC and CGA. No LOB observed. Minimal cueing required for safety awareness and line management. Recommend d/c home with assist when medically appropriate. Pt cleared to d/c today from PT standpoint.    Anticipated Discharge Disposition (PT): home with assist

## 2024-11-04 NOTE — ANESTHESIA PROCEDURE NOTES
Peripheral Block    Pre-sedation assessment completed: 11/4/2024 6:46 AM    Patient reassessed immediately prior to procedure    Patient location during procedure: OR  Start time: 11/4/2024 6:46 AM  Reason for block: at surgeon's request and post-op pain management  Performed by  CRNA/CAA: Guillermo Weaver CRNA  Preanesthetic Checklist  Completed: patient identified, IV checked, site marked, risks and benefits discussed, surgical consent, monitors and equipment checked, pre-op evaluation and timeout performed  Prep:  Pt Position: supine  Sterile barriers:cap, gloves, mask and washed/disinfected hands  Prep: ChloraPrep  Patient monitoring: blood pressure monitoring, continuous pulse oximetry and EKG  Procedure  Performed under: general  Guidance:ultrasound guided and landmark technique  Images:still images obtained, printed/placed on chart    Laterality:Bilateral  Block Type:PECS I and PECS II  Injection Technique:single-shot  Needle Type:short-bevel  Needle Gauge:20 G  Resistance on Injection: none          Medications  Preservative Free Saline:10ml  Comment:Block Injection:  Total volume of LA divided between Right and Left sided blocks         Post Assessment  Injection Assessment: negative aspiration for heme, incremental injection and no paresthesia on injection  Patient Tolerance:comfortable throughout block  Complications:no  Additional Notes  Interpectoral-Pectoserratus Plane   A high-frequency linear transducer, with sterile cover, was placed medial to the coracoid process in the paramedian sagittal plane. The transducer was moved caudally to the 4th rib and rotated slightly to allow an in-plane needle trajectory from medial to lateral. Pectoralis Major Muscle (PMM), Pectoralis Minor Muscle (PmM), Thoracoacromial Artery, Ribs, and Pleura were identified under ultrasound. The insertion site was prepped in sterile fashion and then localized with 2-5 ml of 1% Lidocaine. Using ultrasound-guidance, a 20-gauge  "B-Mercado 4\" Ultraplex 360 non-stimulating echogenic needle was advanced in plane until the tip of the needle was in the fascial plane between the PMM and PmM, lateral to the Thoracoacromial Artery. 1-3ml of preservative free normal saline was used to hydro-dissect the fascial planes. After the fascial plane was verified, 10ml local anesthetic (LA) was injected for Interpectoral fascial plane block. The needle was continued along the same path to the level of the 4th rib below PmM.  Initially preservative free normal saline was used to confirm needle position and then 20 ml of LA was injected for Pectoserratus fascial plane block. Aspiration every 5 ml to prevent intravascular injection. Injection was completed with negative aspiration of blood and negative intravascular injection. Injection pressures were normal with minimal resistance.     Performed by: Guillermo Weaver CRNA            "

## 2024-11-04 NOTE — ANESTHESIA PROCEDURE NOTES
Peripheral Block    Pre-sedation assessment completed: 11/4/2024 6:45 AM    Patient reassessed immediately prior to procedure    Patient location during procedure: pre-op  Start time: 11/4/2024 7:09 AM  Stop time: 11/4/2024 7:19 AM  Reason for block: at surgeon's request and post-op pain management  Performed by  CRNA/CAA: Guillermo Weaver, CRNA  Assisted by: Lesley Yao RN  Preanesthetic Checklist  Completed: patient identified, IV checked, site marked, risks and benefits discussed, surgical consent, monitors and equipment checked, pre-op evaluation and timeout performed  Prep:  Sterile barriers:cap, gloves, mask and washed/disinfected hands  Prep: ChloraPrep  Patient monitoring: blood pressure monitoring, continuous pulse oximetry and EKG  Procedure    Sedation: yes  Performed under: local infiltration  Guidance:ultrasound guided    ULTRASOUND INTERPRETATION.  Using ultrasound guidance a 20 G gauge needle was placed in close proximity to the nerve, at which point, under ultrasound guidance anesthetic was injected in the area of the nerve and spread of the anesthesia was seen on ultrasound in close proximity thereto.  There were no abnormalities seen on ultrasound; a digital image was taken; and the patient tolerated the procedure with no complications. Images:still images obtained, printed/placed on chart    Laterality:left  Block Type:interscalene  Injection Technique:catheter  Needle Type:Tuohy and echogenic  Needle Gauge:18 G  Resistance on Injection: none  Catheter Size:20 G (20g)  Cath Depth at skin: 9 cm    Medications Used: fentaNYL citrate (PF) (SUBLIMAZE) injection - Intravenous   100 mcg - 11/4/2024 7:19:00 AM  bupivacaine PF (MARCAINE) 0.25 % injection - Injection   10 mL - 11/4/2024 7:19:00 AM      Medications  Preservative Free Saline:10ml    Post Assessment  Injection Assessment: negative aspiration for heme, no paresthesia on injection and incremental injection  Patient Tolerance:comfortable  "throughout block  Complications:no  Additional Notes  CATHETER  A high-frequency linear transducer, with sterile cover, was placed in the supraclavicular fossa to identify the subclavian artery and trunks and divisions of the brachial plexus. The transducer was then moved in a cephalad orientation with a slight rotation to continue visualization of the brachial plexus from the trunks and divisions, on to the C5-C7 roots. The insertion site was prepped and draped in sterile fashion. Skin and cutaneous tissue was infiltrated with 2-5 ml of 1% Lidocaine. Using ultrasound-guidance, an 18-gauge Contiplex Ultra 360 Touhy needle was advanced in plane from lateral to medial. Preservative-free normal saline was utilized for hydro-dissection of tissue, advancement of Touhy, and to confirm final needle placement at the fascial plane between the middle scalene muscle and sheath of the brachial plexus (C5-C7). A 20-gauge Contiplex Echo catheter was placed through the needle and advance out the tip of the Touhy 3-5 cm with the \"De La Paz Flip\". The Touhy needle was then removed, and final catheter position verified lateral to the brachial plexus with local anesthetic (LA) and ultrasound visualization. The catheter was secured in the usual fashion with skin glue, benzoin, steri-strips, CHG tegaderm and label noting \"Nerve Block Catheter\". Jerk tape applied at yellow connector and catheter connection. All LA was injected in increments of 3-5 ml after catheter secured. Aspiration every 5 ml to prevent intravascular injection. Injection was completed with negative aspiration of blood and negative intravascular injection. Injection pressures were normal with minimal resistance.   Performed by: Guillermo Weaver CRNA            "

## 2024-11-04 NOTE — ANESTHESIA PROCEDURE NOTES
Airway  Urgency: elective    Date/Time: 11/4/2024 7:40 AM  Airway not difficult    General Information and Staff    Patient location during procedure: OR  CRNA/CAA: Jyoti Maguire CRNA    Indications and Patient Condition  Indications for airway management: airway protection    Preoxygenated: yes  MILS not maintained throughout  Mask difficulty assessment: 1 - vent by mask    Final Airway Details  Final airway type: endotracheal airway      Successful airway: ETT  Cuffed: yes   Successful intubation technique: video laryngoscopy  Facilitating devices/methods: intubating stylet  Endotracheal tube insertion site: oral  Blade: Rivas  Blade size: 4  ETT size (mm): 7.5  Cormack-Lehane Classification: grade I - full view of glottis  Placement verified by: chest auscultation and capnometry   Measured from: lips  ETT/EBT  to lips (cm): 21  Number of attempts at approach: 1  Assessment: lips, teeth, and gum same as pre-op and atraumatic intubation    Additional Comments  Negative epigastric sounds, Breath sound equal bilaterally with symmetric chest rise and fall

## 2024-11-04 NOTE — DISCHARGE INSTRUCTIONS
InfuBLOCK - Patient Information    What is a pain pump?  The InfuBLOCK pump delivers post-operative, non-narcotic, numbing medication to the nerve near the surgical site for pain relief.     Where can I find information about my pain pump?           For more information about your pain pump, scan the QR code.  For additional patient resources, visit [email or web address].                                                                                               While your physician is your primary source for information about your treatment there may be times during your treatment that you need assistance with your infusion pump.     If you need assistance take the following steps:    The Hooked Nursing Hotline is Here for You 24/7.  Please call [phone] for the following concerns or complications:    Answers to questions about your infusion pump                 Tubing disconnect  Assistance with pump alarms                                                      Dislodged catheter  Excessive leakage noted from pump                                         Inadequate pain control    2.   [facility] Acute Pain Service: [phone] is available 24/7 for any further needs or concerns about medication or pain control.     -------------------------------------------------------------------------    Nerve Catheter Removal Instructions  When your device is empty:    Remove your catheter by pulling the dressing off slowly (like you would remove a regular bandage). The catheter should pull right out of the skin.  Check that the BLUE tip is intact.                                                                                     If the catheter is stuck, reposition your   extremity and pull slowly until removed.  *If catheter is HURTING and WON'T come out, stop and call [phone] for further assistance.    Remove medication bag from the black carrying case.  Cut the  tubing on right and left side of pump, and discard the medication bag and tubing into garbage.  Place the pump and black carrying case into the plastic bag and then place this into the return box.  Seal box with blue stickers and return to [place] postal service. THIS IS PRE-PAID POSTAGE.        -------------------------------------------------------------------------

## 2025-03-28 ENCOUNTER — LAB (OUTPATIENT)
Dept: LAB | Facility: HOSPITAL | Age: 75
End: 2025-03-28
Payer: MEDICARE

## 2025-03-28 ENCOUNTER — TRANSCRIBE ORDERS (OUTPATIENT)
Dept: LAB | Facility: HOSPITAL | Age: 75
End: 2025-03-28
Payer: MEDICARE

## 2025-03-28 DIAGNOSIS — E78.00 PURE HYPERCHOLESTEROLEMIA: ICD-10-CM

## 2025-03-28 DIAGNOSIS — E78.00 PURE HYPERCHOLESTEROLEMIA: Primary | ICD-10-CM

## 2025-03-28 LAB
ALBUMIN SERPL-MCNC: 4.3 G/DL (ref 3.5–5.2)
ALP SERPL-CCNC: 94 U/L (ref 39–117)
ALT SERPL W P-5'-P-CCNC: 42 U/L (ref 1–41)
AST SERPL-CCNC: 41 U/L (ref 1–40)
BILIRUB CONJ SERPL-MCNC: 0.3 MG/DL (ref 0–0.3)
BILIRUB INDIRECT SERPL-MCNC: 0.4 MG/DL
BILIRUB SERPL-MCNC: 0.7 MG/DL (ref 0–1.2)
CHOLEST SERPL-MCNC: 117 MG/DL (ref 0–200)
HDLC SERPL-MCNC: 71 MG/DL (ref 40–60)
LDLC SERPL CALC-MCNC: 26 MG/DL (ref 0–100)
LDLC/HDLC SERPL: 0.33 {RATIO}
PROT SERPL-MCNC: 7 G/DL (ref 6–8.5)
TRIGL SERPL-MCNC: 112 MG/DL (ref 0–150)
VLDLC SERPL-MCNC: 20 MG/DL (ref 5–40)

## 2025-03-28 PROCEDURE — 80076 HEPATIC FUNCTION PANEL: CPT

## 2025-03-28 PROCEDURE — 36415 COLL VENOUS BLD VENIPUNCTURE: CPT

## 2025-03-28 PROCEDURE — 80061 LIPID PANEL: CPT

## 2025-03-28 PROCEDURE — 83695 ASSAY OF LIPOPROTEIN(A): CPT

## 2025-03-31 LAB — LPA SERPL-SCNC: 156.2 NMOL/L

## 2025-04-01 ENCOUNTER — OFFICE VISIT (OUTPATIENT)
Age: 75
End: 2025-04-01
Payer: MEDICARE

## 2025-04-01 VITALS
DIASTOLIC BLOOD PRESSURE: 61 MMHG | SYSTOLIC BLOOD PRESSURE: 127 MMHG | HEIGHT: 62 IN | BODY MASS INDEX: 29.26 KG/M2 | HEART RATE: 57 BPM | WEIGHT: 159 LBS

## 2025-04-01 DIAGNOSIS — I10 HYPERTENSION, ESSENTIAL: ICD-10-CM

## 2025-04-01 DIAGNOSIS — I25.10 MILD CAD: Primary | ICD-10-CM

## 2025-04-01 DIAGNOSIS — E78.5 HYPERLIPIDEMIA LDL GOAL <100: ICD-10-CM

## 2025-04-01 PROBLEM — I25.84 CORONARY ARTERY DISEASE DUE TO CALCIFIED CORONARY LESION: Status: ACTIVE | Noted: 2025-04-01

## 2025-04-01 PROCEDURE — 3074F SYST BP LT 130 MM HG: CPT

## 2025-04-01 PROCEDURE — 3078F DIAST BP <80 MM HG: CPT

## 2025-04-01 PROCEDURE — 99214 OFFICE O/P EST MOD 30 MIN: CPT

## 2025-04-01 PROCEDURE — G2211 COMPLEX E/M VISIT ADD ON: HCPCS

## 2025-04-01 PROCEDURE — 1160F RVW MEDS BY RX/DR IN RCRD: CPT

## 2025-04-01 PROCEDURE — 1159F MED LIST DOCD IN RCRD: CPT

## 2025-04-01 RX ORDER — EVOLOCUMAB 140 MG/ML
140 INJECTION, SOLUTION SUBCUTANEOUS
COMMUNITY
Start: 2025-01-13

## 2025-04-01 RX ORDER — ROSUVASTATIN CALCIUM 5 MG/1
5 TABLET, COATED ORAL NIGHTLY
COMMUNITY
Start: 2025-02-19

## 2025-04-01 NOTE — ASSESSMENT & PLAN NOTE
-Patient reports blood pressure well-controlled at home.  -Continue amlodipine 10 mg daily, chlorthalidone 25 mg daily, telmisartan 80 mg daily    Orders:    Basic Metabolic Panel; Future    CBC & Differential; Future

## 2025-04-01 NOTE — ASSESSMENT & PLAN NOTE
-3/28/2025: LDL 26, lipoprotein a 156  -Continue Repatha 140 mg subcu every 2 weeks, rosuvastatin 5 mg daily    Orders:    Lipid Panel; Future    Hepatic Function Panel; Future
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
## 2025-04-01 NOTE — ASSESSMENT & PLAN NOTE
-Patient remains asymptomatic- denies any chest pain, chest pressure, shortness of breath with exertion.  Reports he is very active.  -Continue aspirin 81 mg daily, Repatha 140 mg subcu every 2 weeks, rosuvastatin 5 mg daily, holding beta-blocker secondary to bradycardia  -Advised patient if any chest pressure or dyspnea arises, go to closest ER for evaluation.

## 2025-06-05 ENCOUNTER — TRANSCRIBE ORDERS (OUTPATIENT)
Dept: ADMINISTRATIVE | Facility: HOSPITAL | Age: 75
End: 2025-06-05
Payer: MEDICARE

## 2025-06-05 DIAGNOSIS — I73.9 PVD (PERIPHERAL VASCULAR DISEASE): Primary | ICD-10-CM

## 2025-06-25 ENCOUNTER — TRANSCRIBE ORDERS (OUTPATIENT)
Dept: ADMINISTRATIVE | Facility: HOSPITAL | Age: 75
End: 2025-06-25
Payer: MEDICARE

## 2025-06-25 DIAGNOSIS — I73.9 PERIPHERAL VASCULAR DISEASE: Primary | ICD-10-CM

## 2025-07-01 RX ORDER — EVOLOCUMAB 140 MG/ML
140 INJECTION, SOLUTION SUBCUTANEOUS
Qty: 3 ML | Refills: 1 | Status: SHIPPED | OUTPATIENT
Start: 2025-07-01

## 2025-07-01 NOTE — TELEPHONE ENCOUNTER
Rx Refill Note  Requested Prescriptions     Pending Prescriptions Disp Refills    Repatha SureClick solution auto-injector SureClick injection 3 mL 1     Sig: Inject 1 mL under the skin into the appropriate area as directed Every 14 (Fourteen) Days.      Last office visit with prescribing clinician: 04/01/25  Last telemedicine visit with prescribing clinician: Visit date not found   Next office visit with prescribing clinician: 10/02/25                       Pharmacy Info    Last Fill Date:  Rx Written Date:   Prescribed Qty:   Additional Details from Pharmacy:    Patient passed protocols per luciano.         Estrellita Trevino MA  07/01/25, 11:58 EDT

## 2025-07-29 ENCOUNTER — HOSPITAL ENCOUNTER (OUTPATIENT)
Dept: CARDIOLOGY | Facility: HOSPITAL | Age: 75
Discharge: HOME | End: 2025-07-29
Payer: MEDICARE

## 2025-07-29 VITALS — WEIGHT: 158.95 LBS | BODY MASS INDEX: 29.25 KG/M2 | HEIGHT: 62 IN

## 2025-07-29 VITALS — WEIGHT: 159 LBS | HEIGHT: 62 IN | BODY MASS INDEX: 29.26 KG/M2

## 2025-07-29 DIAGNOSIS — I73.9 PERIPHERAL VASCULAR DISEASE: ICD-10-CM

## 2025-07-29 LAB
BH CV LEA LEFT ANT TIBIAL A DISTAL PSV: 41.6 CM/S
BH CV LEA LEFT ANT TIBIAL A MID PSV: 24.1 CM/S
BH CV LEA LEFT ANT TIBIAL A PROX PSV: 53.1 CM/S
BH CV LEA LEFT CFA DISTAL EDV: 5.71 CM/S
BH CV LEA LEFT CFA DISTAL PSV: 94.25 CM/S
BH CV LEA LEFT DFA PROX PSV: 93.68 CM/S
BH CV LEA LEFT PERONEAL  MID PSV: 82.6 CM/S
BH CV LEA LEFT POPITEAL A  DISTAL PSV: 90.3 CM/S
BH CV LEA LEFT POPITEAL A  PROX PSV: 86.3 CM/S
BH CV LEA LEFT PTA DISTAL PSV: 73.7 CM/S
BH CV LEA LEFT PTA MID PSV: 132.9 CM/S
BH CV LEA LEFT PTA PROX PSV: 89 CM/S
BH CV LEA LEFT SFA DISTAL PSV: 102.3 CM/S
BH CV LEA LEFT SFA MID PSV: 122.2 CM/S
BH CV LEA LEFT SFA PROX PSV: 124.5 CM/S
BH CV LEA LEFT TIBEOPERONEAL PSV: 77.69 CM/S
BH CV LEA RIGHT ANT TIBIAL A DISTAL PSV: 64 CM/S
BH CV LEA RIGHT ANT TIBIAL A MID PSV: 177.1 CM/S
BH CV LEA RIGHT ANT TIBIAL A PROX PSV: 152.9 CM/S
BH CV LEA RIGHT CFA DISTAL PSV: 97.68 CM/S
BH CV LEA RIGHT DFA PROX PSV: 89.11 CM/S
BH CV LEA RIGHT PERONEAL  MID PSV: 77.6 CM/S
BH CV LEA RIGHT POPITEAL A  DISTAL PSV: 70.9 CM/S
BH CV LEA RIGHT POPITEAL A  PROX PSV: 44.2 CM/S
BH CV LEA RIGHT PTA DISTAL PSV: 100.5 CM/S
BH CV LEA RIGHT PTA PROX PSV: 82.4 CM/S
BH CV LEA RIGHT SFA DISTAL PSV: 71.4 CM/S
BH CV LEA RIGHT SFA MID PSV: 117.1 CM/S
BH CV LEA RIGHT SFA PROX PSV: 132.5 CM/S
BH CV LEA RIGHT TIBEOPERONEAL PSV: 96.12 CM/S
BH CV LOWER ARTERIAL LEFT DORSALIS PEDIS SYS MAX: NORMAL
BH CV LOWER ARTERIAL LEFT GREAT TOE SYS MAX: 166
BH CV LOWER ARTERIAL LEFT POST TIBIAL SYS MAX: NORMAL
BH CV LOWER ARTERIAL RIGHT DORSALIS PEDIS SYS MAX: 207
BH CV LOWER ARTERIAL RIGHT GREAT TOE SYS MAX: 209
BH CV LOWER ARTERIAL RIGHT POST TIBIAL SYS MAX: 205
LEFT GROIN CFA SYS: 94.3 CM/SEC
RIGHT GROIN CFA SYS: 97.7 CM/SEC
UPPER ARTERIAL LEFT ARM BRACHIAL SYS MAX: 112
UPPER ARTERIAL RIGHT ARM BRACHIAL SYS MAX: 112

## 2025-07-29 PROCEDURE — 93925 LOWER EXTREMITY STUDY: CPT | Performed by: INTERNAL MEDICINE

## 2025-07-29 PROCEDURE — 93925 LOWER EXTREMITY STUDY: CPT

## 2025-07-29 PROCEDURE — 93922 UPR/L XTREMITY ART 2 LEVELS: CPT

## 2025-07-29 PROCEDURE — 93922 UPR/L XTREMITY ART 2 LEVELS: CPT | Performed by: INTERNAL MEDICINE

## 2025-08-11 ENCOUNTER — TRANSCRIBE ORDERS (OUTPATIENT)
Dept: ADMINISTRATIVE | Facility: HOSPITAL | Age: 75
End: 2025-08-11
Payer: MEDICARE

## 2025-08-11 DIAGNOSIS — M25.375 FOOT JOINT INSTABILITY, LEFT: Primary | ICD-10-CM

## (undated) DEVICE — PENCL SMOKE/EVAC MEGADYNE TELESCP 10FT

## (undated) DEVICE — PIN HOLD TEMP NOHEAD FLUT 1/8X3.5IN

## (undated) DEVICE — SKN PREP SPNG STKS PVP PNT STR: Brand: MEDLINE INDUSTRIES, INC.

## (undated) DEVICE — SUT VIC 1 CTX 36IN OBGYN VCP977H

## (undated) DEVICE — BLANKT WARM LOWR/BDY 100X120CM

## (undated) DEVICE — HANDPIECE SET WITH HIGH FLOW TIP AND SUCTION TUBE: Brand: INTERPULSE

## (undated) DEVICE — ELECTRD BLD EZ CLN STD 6.5IN

## (undated) DEVICE — DRAPE,SHOULDER,BEACH CHAIR,STERILE: Brand: MEDLINE

## (undated) DEVICE — NDL SPINE 22G 31/2IN BLK

## (undated) DEVICE — SKIN AFFIX SURG ADHESIVE 72/CS 0.55ML: Brand: MEDLINE

## (undated) DEVICE — STERILE PVP: Brand: MEDLINE INDUSTRIES, INC.

## (undated) DEVICE — SPNG GZ WOVN 4X4IN 12PLY 10/BX STRL

## (undated) DEVICE — ANTIBACTERIAL UNDYED BRAIDED (POLYGLACTIN 910), SYNTHETIC ABSORBABLE SUTURE: Brand: COATED VICRYL

## (undated) DEVICE — KT PUMP INFUBLOCK MDL 2100 PMKITSOLIS

## (undated) DEVICE — POSTN HD UNIV

## (undated) DEVICE — GLV SURG SENSICARE PI ORTHO SZ7.5 LF STRL

## (undated) DEVICE — PK MAJ SHLDR SPLT 10

## (undated) DEVICE — UNDERGLV SURG BIOGEL INDICAT PI SZ8 BLU

## (undated) DEVICE — 1010 S-DRAPE TOWEL DRAPE 10/BX: Brand: STERI-DRAPE™

## (undated) DEVICE — PUMP PAIN AUTOFUSER AUTO SELCT NOBOLUS 1TO14ML/HR 550ML DISP

## (undated) DEVICE — DRSNG SURG AQUACEL AG 9X25CM

## (undated) DEVICE — GLV SURG TRIUMPH ORTHO W/ALOE PF LTX 8 STRL

## (undated) DEVICE — GOWN,NON-REINFORCED,SIRUS,SET IN SLV,XL: Brand: MEDLINE

## (undated) DEVICE — SUT MONOCRYL PLS ANTIB UND 3/0  PS1 27IN

## (undated) DEVICE — MEDI-VAC YANKAUER SUCTION HANDLE: Brand: CARDINAL HEALTH

## (undated) DEVICE — GLV SURG SIGNATURE TOUCH PF LTX 8 STRL BX/50

## (undated) DEVICE — SYR LL 3CC

## (undated) DEVICE — TB SXN FRAZIER 12F STRL

## (undated) DEVICE — STRYKER PERFORMANCE SERIES SAGITTAL BLADE: Brand: STRYKER PERFORMANCE SERIES

## (undated) DEVICE — UNDERCAST PADDING: Brand: DEROYAL

## (undated) DEVICE — PATIENT RETURN ELECTRODE, SINGLE-USE, CONTACT QUALITY MONITORING, ADULT, WITH 9FT CORD, FOR PATIENTS WEIGING OVER 33LBS. (15KG): Brand: MEGADYNE

## (undated) DEVICE — 3 BONE CEMENT MIXER: Brand: MIXEVAC

## (undated) DEVICE — GLV SURG PREMIERPRO MIC LTX PF SZ8.5 BRN

## (undated) DEVICE — SYR LUERLOK 30CC

## (undated) DEVICE — SYR CATH/TIP 50ML 2OZ STRL 1P/U

## (undated) DEVICE — PK KN TOTL 10

## (undated) DEVICE — BNDG ELAS W/CLIP 6IN 10YD LF STRL

## (undated) DEVICE — CVR HNDL LT SURG ACCSSRY BLU STRL

## (undated) DEVICE — ADHS SKIN PREMIERPRO EXOFIN TOPICAL HI/VISC .5ML